# Patient Record
Sex: FEMALE | Race: WHITE | Employment: UNEMPLOYED | URBAN - METROPOLITAN AREA
[De-identification: names, ages, dates, MRNs, and addresses within clinical notes are randomized per-mention and may not be internally consistent; named-entity substitution may affect disease eponyms.]

---

## 2019-02-13 ENCOUNTER — OFFICE VISIT (OUTPATIENT)
Dept: FAMILY MEDICINE CLINIC | Age: 59
End: 2019-02-13

## 2019-02-13 VITALS
HEIGHT: 67 IN | BODY MASS INDEX: 25.39 KG/M2 | HEART RATE: 58 BPM | DIASTOLIC BLOOD PRESSURE: 50 MMHG | OXYGEN SATURATION: 97 % | TEMPERATURE: 97.5 F | SYSTOLIC BLOOD PRESSURE: 123 MMHG | WEIGHT: 161.8 LBS

## 2019-02-13 DIAGNOSIS — G89.29 CHRONIC LEFT HIP PAIN: Primary | ICD-10-CM

## 2019-02-13 DIAGNOSIS — M25.552 CHRONIC LEFT HIP PAIN: Primary | ICD-10-CM

## 2019-02-13 DIAGNOSIS — Z72.0 TOBACCO USE: ICD-10-CM

## 2019-02-13 RX ORDER — IBUPROFEN 200 MG
TABLET ORAL
COMMUNITY
End: 2019-07-22

## 2019-02-13 NOTE — PROGRESS NOTES
Marisol Gipson is a 60 y.o. female presents in office establish care, left hip pain x 2 to 3 weeks Health Maintenance Due Topic Date Due  
 Hepatitis C Screening  1960  
 DTaP/Tdap/Td series (1 - Tdap) 08/21/1980  PAP AKA CERVICAL CYTOLOGY  08/21/1980  Shingrix Vaccine Age 50> (1 of 2) 08/21/2009  BREAST CANCER SCRN MAMMOGRAM  08/21/2009  FOBT Q 1 YEAR AGE 50-75  08/21/2009  Influenza Age 5 to Adult  08/01/2018 1. Have you been to the ER, urgent care clinic since your last visit? Hospitalized since your last visit? No 
 
2. Have you seen or consulted any other health care providers outside of the [address] [place] [address] since your last visit? Include any pap smears or colon screening. Yes When: 1/2019 foot doctor through [facility]

## 2019-02-13 NOTE — PROGRESS NOTES
Herson Alanis Chief Complaint Patient presents with [facility] Establish Care  Hip Pain  
  left hip pain x 2 to 3 years Vitals:  
 02/13/19 1456 BP: 123/50 Pulse: (!) 58 Temp: 97.5 °F (36.4 °C) TempSrc: Oral  
SpO2: 97% Weight: 161 lb 12.8 oz (73.4 kg) Height: 5' 7\" (1.702 m) PainSc:  10 - Worst pain ever PainLoc: Hip HPI: Patient is here to establish care and also to address her hip pain that she had for many years patient stated that she had no medications for over 10 years and has not addressed the hip pain that is getting worse to the point the patient cannot walk without cane. Patient is she is not on any current medication other than ibuprofen as needed for pain. Patient advised to come back for physical so she can get blood work. She is active smoker for many years and she is not ready to quit at this time. History reviewed. No pertinent past medical history. Past Surgical History:  
Procedure Laterality Date  HX CERVICAL DISKECTOMY  HX CHOLECYSTECTOMY Social History Tobacco Use  Smoking status: Current Every Day Smoker Packs/day: 1.00  Smokeless tobacco: Never Used Substance Use Topics  Alcohol use: No  
  Frequency: Never History reviewed. No pertinent family history. Review of Systems Constitutional: Negative for chills, fever, malaise/fatigue and weight loss. HENT: Negative for congestion, ear discharge, ear pain, hearing loss, nosebleeds, sinus pain and sore throat. Eyes: Negative for blurred vision, double vision and discharge. Respiratory: Negative for cough, hemoptysis, sputum production, shortness of breath, wheezing and stridor. Cardiovascular: Negative for chest pain, palpitations, claudication and leg swelling. Gastrointestinal: Negative for abdominal pain, constipation, diarrhea, nausea and vomiting. Genitourinary: Negative for dysuria, frequency and urgency. Musculoskeletal: Positive for joint pain and myalgias. Negative for back pain, falls and neck pain. Skin: Negative for itching and rash. Neurological: Negative for dizziness, tingling, sensory change, speech change, focal weakness, weakness and headaches. Psychiatric/Behavioral: Negative for depression and suicidal ideas. Physical Exam  
Constitutional: She is oriented to person, place, and time. She appears well-developed and well-nourished. No distress. HENT:  
Head: Normocephalic and atraumatic. Mouth/Throat: Oropharynx is clear and moist. No oropharyngeal exudate. Eyes: Conjunctivae are normal. Pupils are equal, round, and reactive to light. Right eye exhibits no discharge. Left eye exhibits no discharge. No scleral icterus. Neck: Normal range of motion. Neck supple. No thyromegaly present. Cardiovascular: Normal rate, regular rhythm and normal heart sounds. No murmur heard. Pulmonary/Chest: Effort normal and breath sounds normal. No respiratory distress. She has no wheezes. She has no rales. She exhibits no tenderness. Abdominal: Soft. She exhibits no distension. There is no tenderness. There is no rebound. Musculoskeletal: She exhibits tenderness. She exhibits no edema or deformity. She is using a cane to walk Right hip normal range of motion Hip limited flexion and limited external rotation due to pain. Tenderness and palpation around the hip joint Lymphadenopathy:  
  She has no cervical adenopathy. Neurological: She is alert and oriented to person, place, and time. No cranial nerve deficit. Coordination normal.  
Skin: Skin is warm and dry. No rash noted. She is not diaphoretic. No erythema. No pallor. Psychiatric: She has a normal mood and affect. Her behavior is normal. Judgment and thought content normal.  
Nursing note and vitals reviewed.  
  
 
Assessment and plan  
 
Plan of care has been discussed with the patient, he agrees to the plan and verbalized understanding. All his questions were answered More than 50% of the time spent in this visit was counseling the patient about  illness and treatment options 1. Tobacco use Strongly recommend to the patient that she work on smoking cessation medications and nicotine products has been offered, patient understand that increased risk for coronary disease and stroke and lung cancer. She will like to discuss that at further visits, patient is not ready to quit now she is in pre-contemplative state 2. Chronic left hip pain Chronic hip pain x-ray showed possible avascular necrosis orthopedic referral was given - XR HIP LT W OR WO PELV 2-3 VWS; Future 
- REFERRAL TO ORTHOPEDICS Current Outpatient Medications Medication Sig Dispense Refill  ibuprofen (MOTRIN) 200 mg tablet Take  by mouth. Patient Active Problem List  
 Diagnosis Date Noted  Tobacco use 02/13/2019  Chronic left hip pain 02/13/2019 No results found for this or any previous visit. No results found for any previous visit. Follow-up Disposition: Not on File

## 2019-02-15 ENCOUNTER — TELEPHONE (OUTPATIENT)
Dept: FAMILY MEDICINE CLINIC | Age: 59
End: 2019-02-15

## 2019-02-18 NOTE — TELEPHONE ENCOUNTER
Patient x-ray showed had arthritis.     Is being referred for orthopedics for MRI and further evaluation

## 2019-02-19 ENCOUNTER — TELEPHONE (OUTPATIENT)
Dept: FAMILY MEDICINE CLINIC | Age: 59
End: 2019-02-19

## 2019-02-19 ENCOUNTER — OFFICE VISIT (OUTPATIENT)
Dept: FAMILY MEDICINE CLINIC | Age: 59
End: 2019-02-19

## 2019-02-19 VITALS
BODY MASS INDEX: 25.3 KG/M2 | DIASTOLIC BLOOD PRESSURE: 61 MMHG | HEIGHT: 67 IN | SYSTOLIC BLOOD PRESSURE: 131 MMHG | HEART RATE: 62 BPM | WEIGHT: 161.2 LBS | OXYGEN SATURATION: 97 % | TEMPERATURE: 97.9 F

## 2019-02-19 DIAGNOSIS — M21.932 ACQUIRED DEFORMITY OF LEFT WRIST: ICD-10-CM

## 2019-02-19 DIAGNOSIS — G89.29 CHRONIC LEFT HIP PAIN: Primary | ICD-10-CM

## 2019-02-19 DIAGNOSIS — M25.552 CHRONIC LEFT HIP PAIN: Primary | ICD-10-CM

## 2019-02-19 DIAGNOSIS — G89.29 CHRONIC LEFT HIP PAIN: ICD-10-CM

## 2019-02-19 DIAGNOSIS — M25.532 LEFT WRIST PAIN: ICD-10-CM

## 2019-02-19 DIAGNOSIS — M25.50 MULTIPLE JOINT PAIN: ICD-10-CM

## 2019-02-19 DIAGNOSIS — R76.8 POSITIVE ANA (ANTINUCLEAR ANTIBODY): Primary | ICD-10-CM

## 2019-02-19 DIAGNOSIS — M21.942 DEFORMITY OF LEFT HAND: ICD-10-CM

## 2019-02-19 DIAGNOSIS — M25.552 CHRONIC LEFT HIP PAIN: ICD-10-CM

## 2019-02-19 NOTE — TELEPHONE ENCOUNTER
I contacted and spoke with Daniel Huffman at [phone]  regarding scheduling her STAT MRI from her last office visit on 2/19/2019, at the direction of Dr. Dariel Canales. I verbalized:  [facility] is still working on the prior authorization with her insurance for the MRI of the hip- and I will contact her ASAP with the location for her MRI appt. Daniel Huffman acknowledged understanding, and repeated it back. Closing Encounter.

## 2019-02-19 NOTE — TELEPHONE ENCOUNTER
Dr. Terrance Gonzalez discussed this with patient in office and patient is being scheduled for stat MRI. Closing encounter.

## 2019-02-19 NOTE — PROGRESS NOTES
Mariano Barron is a 60 y.o. female presents in office left side and left hip pain x 3 to 4 years and getting worse Health Maintenance Due Topic Date Due  
 Hepatitis C Screening  1960  Pneumococcal 19-64 Medium Risk (1 of 1 - PPSV23) 08/21/1978  DTaP/Tdap/Td series (1 - Tdap) 08/21/1980  PAP AKA CERVICAL CYTOLOGY  08/21/1980  Shingrix Vaccine Age 50> (1 of 2) 08/21/2009  BREAST CANCER SCRN MAMMOGRAM  08/21/2009  FOBT Q 1 YEAR AGE 50-75  08/21/2009  Influenza Age 5 to Adult  08/01/2018 1. Have you been to the ER, urgent care clinic since your last visit? Hospitalized since your last visit? No 
 
2. Have you seen or consulted any other health care providers outside of the [address] [place] [address] since your last visit? Include any pap smears or colon screening.  No

## 2019-02-19 NOTE — PROGRESS NOTES
Audra Escobar Chief Complaint Patient presents with  Side Pain  
  left sided pain x 3 to 4 years and getting worse  Hip Pain Vitals:  
 02/19/19 2207 BP: 131/61 Pulse: 62 Temp: 97.9 °F (36.6 °C) TempSrc: Oral  
SpO2: 97% Weight: 161 lb 3.2 oz (73.1 kg) Height: 5' 7\" (1.702 m) PainSc:  10 - Worst pain ever PainLoc: Hip HPI: Patient is here for follow-up on her left hip pain, x-ray was done and it showed arthritis of remodeling and possible avascular necrosis, to orthopedics, x-ray recommended MRI, MRI will be ordered today stat before the patient seen orthopedics. Also has left wrist pain and deformity, as well as hand deformity, she has been having these changes in the last 4 months. Patient has not seen a doctor in 10 years. She had a family history of paternal aunt for rheumatoid arthritis. Blood product for rheumatoid arthritis would be done today as well as x-ray of the left wrist and hand History reviewed. No pertinent past medical history. Past Surgical History:  
Procedure Laterality Date  HX CERVICAL DISKECTOMY  HX CHOLECYSTECTOMY Social History Tobacco Use  Smoking status: Current Every Day Smoker Packs/day: 1.00  Smokeless tobacco: Never Used Substance Use Topics  Alcohol use: No  
  Frequency: Never History reviewed. No pertinent family history. Review of Systems Constitutional: Negative for chills, fever, malaise/fatigue and weight loss. HENT: Negative for congestion, ear discharge, ear pain, hearing loss and nosebleeds. Eyes: Negative for blurred vision, double vision and discharge. Respiratory: Negative for cough. Cardiovascular: Negative for chest pain, palpitations, claudication and leg swelling. Gastrointestinal: Negative for abdominal pain, constipation, diarrhea, nausea and vomiting. Genitourinary: Negative for dysuria, frequency and urgency. Musculoskeletal: Positive for joint pain. Negative for myalgias. Skin: Negative for itching and rash. Neurological: Negative for dizziness, tingling, sensory change, speech change, focal weakness, weakness and headaches. Psychiatric/Behavioral: Negative for depression and suicidal ideas. Physical Exam  
Constitutional: She is oriented to person, place, and time. She appears well-developed and well-nourished. HENT:  
Head: Normocephalic and atraumatic. Neck: Normal range of motion. Neck supple. Pulmonary/Chest: Effort normal.  
Musculoskeletal: She exhibits deformity. Patient is walking with a limp on a stick due to left hip pain. There is enlargement of the ulnar head and tenderness at the wrist area. Patient has deformity of the left hand small joints no tenderness. Neurological: She is alert and oriented to person, place, and time. She has normal reflexes. No cranial nerve deficit. Skin: Skin is warm and dry. No rash noted. No erythema. Nursing note and vitals reviewed. Assessment and plan  
 
Plan of care has been discussed with the patient, he agrees to the plan and verbalized understanding. All his questions were answered More than 50% of the time spent in this visit was counseling the patient about  illness and treatment options 1. Chronic left hip pain - MRI HIP LT WO CONT; Future - CBC WITH AUTOMATED DIFF; Future 
- RHEUMATOID FACTOR, QL; Future - DEBORAH COMPREHENSIVE PANEL; Future - C REACTIVE PROTEIN, QT; Future 2. Left wrist pain - XR WRIST LT AP/LAT; Future - CBC WITH AUTOMATED DIFF; Future 
- RHEUMATOID FACTOR, QL; Future - DEBORAH COMPREHENSIVE PANEL; Future - C REACTIVE PROTEIN, QT; Future 3. Deformity of left hand - XR HAND LT MIN 3 V; Future - CBC WITH AUTOMATED DIFF; Future 
- RHEUMATOID FACTOR, QL; Future - DEBORAH COMPREHENSIVE PANEL; Future - C REACTIVE PROTEIN, QT; Future 4.  Acquired deformity of left wrist 
 
 - XR WRIST LT AP/LAT; Future - CBC WITH AUTOMATED DIFF; Future 
- RHEUMATOID FACTOR, QL; Future - DEBORAH COMPREHENSIVE PANEL; Future - C REACTIVE PROTEIN, QT; Future 5. Multiple joint pain 
 
- CBC WITH AUTOMATED DIFF; Future 
- RHEUMATOID FACTOR, QL; Future - DEBORAH COMPREHENSIVE PANEL; Future - C REACTIVE PROTEIN, QT; Future Current Outpatient Medications Medication Sig Dispense Refill  ibuprofen (MOTRIN) 200 mg tablet Take  by mouth. Patient Active Problem List  
 Diagnosis Date Noted  Tobacco use 02/13/2019  Chronic left hip pain 02/13/2019 No results found for this or any previous visit. No results found for any previous visit. Follow-up Disposition: Not on File

## 2019-02-21 ENCOUNTER — TELEPHONE (OUTPATIENT)
Dept: FAMILY MEDICINE CLINIC | Age: 59
End: 2019-02-21

## 2019-02-21 NOTE — TELEPHONE ENCOUNTER
Pt called to get status of MRI results from 2/19/19.  Pt can be reached @ [phone]    Please advise

## 2019-02-25 NOTE — TELEPHONE ENCOUNTER
MRI showed arthritis of the hip joint, as well as avascular necrosis which is lack of blood supply to the head of the hip joint also there is some fluid collection in the hip joint.     Further plan and treatment will be discussed with orthopedics

## 2019-03-01 LAB
ABSOLUTE LYMPHOCYTE COUNT, 10803: 1.4 K/UL (ref 1–4.8)
ANTI-DNA (DS) AB QN, 1189: <1 IU/ML
BASOPHILS # BLD: 0 K/UL (ref 0–0.2)
BASOPHILS NFR BLD: 1 % (ref 0–2)
C-REACTIVE PROTEIN, QT, 006627: 0.2 MG/DL (ref 0–0.5)
CENTROMERE B ANTIBODY, 601143: >8 AI
CHROMATIN ANTIBODY: <0.2 AI
ENA SS-A AB SER-ACNC: <0.2 AI
ENA SS-B AB SER-ACNC: <0.2 AI
EOSINOPHIL # BLD: 0.2 K/UL (ref 0–0.5)
EOSINOPHIL NFR BLD: 4 % (ref 0–6)
ERYTHROCYTE [DISTWIDTH] IN BLOOD BY AUTOMATED COUNT: 13.9 % (ref 10–15.5)
GRANULOCYTES,GRANS: 60 % (ref 40–75)
HCT VFR BLD AUTO: 39.3 % (ref 35.1–48)
HGB BLD-MCNC: 12.6 G/DL (ref 11.7–16)
JO1 ANTIBODY, 8107: <0.2 AI
LYMPHOCYTES, LYMLT: 26 % (ref 20–45)
MCH RBC QN AUTO: 32 PG (ref 26–34)
MCHC RBC AUTO-ENTMCNC: 32 G/DL (ref 31–36)
MCV RBC AUTO: 98 FL (ref 80–95)
MONOCYTES # BLD: 0.5 K/UL (ref 0.1–1)
MONOCYTES NFR BLD: 9 % (ref 3–12)
NEUTROPHILS # BLD AUTO: 3.1 K/UL (ref 1.8–7.7)
PLATELET # BLD AUTO: 202 K/UL (ref 140–440)
PMV BLD AUTO: 10.1 FL (ref 9–13)
RBC # BLD AUTO: 4 M/UL (ref 3.8–5.2)
RHEUMATOID FACTOR QUANT, IMMUNOTURBIDIMETRIC: <20 IU/ML (ref 0–20)
RNP ABS, 016354: 0.7 AI
SCLERODERMA AB (SCL-70), 601116: <0.2 AI
SMITH ABS, 016362: <0.2 AI
WBC # BLD AUTO: 5.2 K/UL (ref 4–11)

## 2019-03-06 NOTE — PROGRESS NOTES
Patient negative for rheumatoid factor and C-reactive protein this is positive for 1 of the DEBORAH antibodies she will be referred for rheumatology

## 2019-03-08 ENCOUNTER — TELEPHONE (OUTPATIENT)
Dept: FAMILY MEDICINE CLINIC | Age: 59
End: 2019-03-08

## 2019-03-08 NOTE — TELEPHONE ENCOUNTER
----- Message from Jazz Mace MD sent at 3/6/2019  1:14 PM EST -----  Patient negative for rheumatoid factor and C-reactive protein this is positive for 1 of the DEBORAH antibodies she will be referred for rheumatology

## 2019-03-19 DIAGNOSIS — M25.552 CHRONIC LEFT HIP PAIN: ICD-10-CM

## 2019-03-19 DIAGNOSIS — G89.29 CHRONIC LEFT HIP PAIN: ICD-10-CM

## 2019-04-02 ENCOUNTER — OFFICE VISIT (OUTPATIENT)
Dept: FAMILY MEDICINE CLINIC | Age: 59
End: 2019-04-02

## 2019-04-02 VITALS
WEIGHT: 156 LBS | BODY MASS INDEX: 24.48 KG/M2 | DIASTOLIC BLOOD PRESSURE: 73 MMHG | TEMPERATURE: 98 F | HEIGHT: 67 IN | OXYGEN SATURATION: 98 % | RESPIRATION RATE: 18 BRPM | HEART RATE: 70 BPM | SYSTOLIC BLOOD PRESSURE: 135 MMHG

## 2019-04-02 NOTE — PROGRESS NOTES
A user error has taken place: encounter opened in error, closed for administrative reasons. Patient was not seen by provider.

## 2019-04-30 ENCOUNTER — OFFICE VISIT (OUTPATIENT)
Dept: FAMILY MEDICINE CLINIC | Age: 59
End: 2019-04-30

## 2019-04-30 VITALS
HEART RATE: 75 BPM | BODY MASS INDEX: 24.48 KG/M2 | WEIGHT: 156 LBS | HEIGHT: 67 IN | TEMPERATURE: 98.1 F | RESPIRATION RATE: 18 BRPM | OXYGEN SATURATION: 98 % | DIASTOLIC BLOOD PRESSURE: 78 MMHG | SYSTOLIC BLOOD PRESSURE: 158 MMHG

## 2019-04-30 DIAGNOSIS — M25.552 LEFT HIP PAIN: ICD-10-CM

## 2019-04-30 DIAGNOSIS — Z01.818 PREOP EXAMINATION: Primary | ICD-10-CM

## 2019-04-30 DIAGNOSIS — M16.12 PRIMARY OSTEOARTHRITIS OF LEFT HIP: ICD-10-CM

## 2019-04-30 RX ORDER — OXYCODONE AND ACETAMINOPHEN 10; 325 MG/1; MG/1
1 TABLET ORAL
Qty: 21 TAB | Refills: 0 | Status: SHIPPED | OUTPATIENT
Start: 2019-04-30 | End: 2019-05-07

## 2019-04-30 NOTE — PROGRESS NOTES
Aguilar Morrison is a 60 y.o. female presents to office for pre op    Medication list has been reviewed with Aguilar Morrison and updated as of today's date     Health Maintenance items with a due date reviewed with patient:  Health Maintenance Due   Topic Date Due    Hepatitis C Screening  1960    Pneumococcal 0-64 years (1 of 1 - PPSV23) 08/21/1965    DTaP/Tdap/Td series (1 - Tdap) 08/21/1980    PAP AKA CERVICAL CYTOLOGY  08/21/1980    Shingrix Vaccine Age 50> (1 of 2) 08/21/2009    BREAST CANCER SCRN MAMMOGRAM  08/21/2009    FOBT Q 1 YEAR AGE 50-75  08/21/2009

## 2019-04-30 NOTE — PROGRESS NOTES
Bertrand Pollock     Chief Complaint   Patient presents with    Pre-op Exam     Vitals:    04/30/19 1331 04/30/19 1405   BP: 160/80 158/78   Pulse: 75    Resp: 18    Temp: 98.1 °F (36.7 °C)    TempSrc: Oral    SpO2: 98%    Weight: 156 lb (70.8 kg)    Height: 5' 7\" (1.702 m)    PainSc:  10 - Worst pain ever          HPI: Patient is here for preop for left hip replacement, due to osteoarthritis of the left hip. She was seen by Meche Hameed and she will be scheduled for total hip replacement at [facility] end of April or beginning of May preop blood work was done is reviewed is within normal limits chest x-ray is negative EKG normal sinus rhythm no acute changes. Patient has no chest pain, no dyspnea or or chest pain with exertion. She currently cutting back on her smoking now she is down to half a pack a day. Patient never had any problem with heart disease, no history of hypertension or diabetes. She is complaining today about left hip pain pain is 10 out of 10, she is taking ibuprofen over-the-counter 200 mg 3 tablets twice daily, with no much relief in her pain. Blood pressure is elevated today patient does not have hypertension possible related to pain. Past Medical History:   Diagnosis Date    Back pain      Past Surgical History:   Procedure Laterality Date    HX CERVICAL DISKECTOMY      HX CHOLECYSTECTOMY       Social History     Tobacco Use    Smoking status: Current Every Day Smoker     Packs/day: 1.00    Smokeless tobacco: Never Used   Substance Use Topics    Alcohol use: No     Frequency: Never       History reviewed. No pertinent family history. Review of Systems   Constitutional: Negative for chills, fever, malaise/fatigue and weight loss. HENT: Negative for congestion, ear discharge, ear pain, hearing loss, nosebleeds, sinus pain and sore throat. Eyes: Negative for blurred vision, double vision and discharge.    Respiratory: Negative for cough, hemoptysis, shortness of breath and wheezing. Cardiovascular: Negative for chest pain, palpitations, claudication and leg swelling. Gastrointestinal: Negative for abdominal pain, constipation, diarrhea, nausea and vomiting. Genitourinary: Negative for dysuria, frequency and urgency. Musculoskeletal: Positive for joint pain. Negative for myalgias. Skin: Negative for itching and rash. Neurological: Negative for dizziness, tingling, sensory change, speech change, focal weakness, weakness and headaches. Psychiatric/Behavioral: Negative for depression and suicidal ideas. Physical Exam   Constitutional: She is oriented to person, place, and time. She appears well-developed and well-nourished. No distress. HENT:   Head: Normocephalic and atraumatic. Mouth/Throat: Oropharynx is clear and moist. No oropharyngeal exudate. Eyes: Pupils are equal, round, and reactive to light. Conjunctivae are normal. Right eye exhibits no discharge. Left eye exhibits no discharge. No scleral icterus. Neck: Normal range of motion. Neck supple. No thyromegaly present. Cardiovascular: Normal rate, regular rhythm and normal heart sounds. No murmur heard. Pulmonary/Chest: Effort normal and breath sounds normal. No respiratory distress. She has no wheezes. She has no rales. She exhibits no tenderness. Abdominal: Soft. Bowel sounds are normal. She exhibits no distension. There is no tenderness. There is no rebound. Musculoskeletal: Normal range of motion. She exhibits tenderness. She exhibits no edema or deformity. Lymphadenopathy:     She has no cervical adenopathy. Neurological: She is alert and oriented to person, place, and time. No cranial nerve deficit. Coordination normal.   Skin: Skin is warm and dry. No rash noted. She is not diaphoretic. No erythema. No pallor. Psychiatric: She has a normal mood and affect.  Her behavior is normal. Judgment and thought content normal.   Nursing note and vitals reviewed. Assessment and plan     Plan of care has been discussed with the patient, he agrees to the plan and verbalized understanding. All his questions were answered  More than 50% of the time spent in this visit was counseling the patient about  illness and treatment options         1. Primary osteoarthritis of left hip    - oxyCODONE-acetaminophen (PERCOCET 10)  mg per tablet; Take 1 Tab by mouth every eight (8) hours as needed for Pain for up to 7 days. Max Daily Amount: 3 Tabs. Dispense: 21 Tab; Refill: 0    2. Left hip pain      3. Preop examination      Patient is medically clear for left hip replacement under general anesthesia,    Patient had elevated blood pressure today she was advised to come to the office in 2 days to recheck blood pressure, please check blood pressure day of surgery. Current Outpatient Medications   Medication Sig Dispense Refill    oxyCODONE-acetaminophen (PERCOCET 10)  mg per tablet Take 1 Tab by mouth every eight (8) hours as needed for Pain for up to 7 days. Max Daily Amount: 3 Tabs. 21 Tab 0    ibuprofen (MOTRIN) 200 mg tablet Take  by mouth.  ibuprofen (MOTRIN) 600 mg tablet Take 1 Tab by mouth every six (6) hours as needed for Pain. 20 Tab 0       Patient Active Problem List    Diagnosis Date Noted    Tobacco use 02/13/2019    Chronic left hip pain 02/13/2019     Results for orders placed or performed in visit on 02/19/19   CBC WITH AUTOMATED DIFF   Result Value Ref Range    WBC 5.2 4.0 - 11.0 K/uL    RBC 4.00 3.80 - 5.20 M/uL    HGB 12.6 11.7 - 16.0 g/dL    HCT 39.3 35.1 - 48.0 %    MCV 98 (H) 80 - 95 fL    MCH 32 26 - 34 pg    MCHC 32 31 - 36 g/dL    RDW 13.9 10.0 - 15.5 %    PLATELET 248 495 - 615 K/uL    MPV 10.1 9.0 - 13.0 fL    NEUTROPHILS 60 40 - 75 %    Lymphocytes 26 20 - 45 %    MONOCYTES 9 3 - 12 %    EOSINOPHILS 4 0 - 6 %    BASOPHILS 1 0 - 2 %    ABS.  NEUTROPHILS 3.1 1.8 - 7.7 K/uL    ABSOLUTE LYMPHOCYTE COUNT 1.4 1.0 - 4.8 K/uL ABS. MONOCYTES 0.5 0.1 - 1.0 K/uL    ABS. EOSINOPHILS 0.2 0.0 - 0.5 K/uL    ABS. BASOPHILS 0.0 0.0 - 0.2 K/uL   RHEUMATOID FACTOR, QL   Result Value Ref Range    RHEUMATOID FACTOR QUANT, IMMUNOTURBIDIMETRIC <20 0 - 20 IU/mL   DEBORAH COMPREHENSIVE PANEL   Result Value Ref Range    Sjogren's Anti-SS-A <0.2 <1.0 AI    Sjogren's Anti-SS-B <0.2 <1.0 AI    JO1 ANTIBODY <0.2 <1.0 AI    RNP Abs 0.7 <1.0 AI    Scleroderma Ab (SCL-70) <0.2 <1.0 AI    CENTROMERE B ANTIBODY >8.0 (H) <1.0 AI    Smith Abs <0.2 <1.0 AI    CHROMATIN ANTIBODY <0.2 <1.0 AI    ANTI-DNA (DS) AB QN <1 <=4 IU/ml   C REACTIVE PROTEIN, QT   Result Value Ref Range    C-Reactive Protein, Qt 0.2 0.0 - 0.5 mg/dL     Orders Only on 02/19/2019   Component Date Value Ref Range Status    WBC 02/28/2019 5.2  4.0 - 11.0 K/uL Final    RBC 02/28/2019 4.00  3.80 - 5.20 M/uL Final    HGB 02/28/2019 12.6  11.7 - 16.0 g/dL Final    HCT 02/28/2019 39.3  35.1 - 48.0 % Final    MCV 02/28/2019 98* 80 - 95 fL Final    MCH 02/28/2019 32  26 - 34 pg Final    MCHC 02/28/2019 32  31 - 36 g/dL Final    RDW 02/28/2019 13.9  10.0 - 15.5 % Final    PLATELET 84/49/7552 065  140 - 440 K/uL Final    MPV 02/28/2019 10.1  9.0 - 13.0 fL Final    NEUTROPHILS 02/28/2019 60  40 - 75 % Final    Lymphocytes 02/28/2019 26  20 - 45 % Final    MONOCYTES 02/28/2019 9  3 - 12 % Final    EOSINOPHILS 02/28/2019 4  0 - 6 % Final    BASOPHILS 02/28/2019 1  0 - 2 % Final    ABS. NEUTROPHILS 02/28/2019 3.1  1.8 - 7.7 K/uL Final    ABSOLUTE LYMPHOCYTE COUNT 02/28/2019 1.4  1.0 - 4.8 K/uL Final    ABS. MONOCYTES 02/28/2019 0.5  0.1 - 1.0 K/uL Final    ABS. EOSINOPHILS 02/28/2019 0.2  0.0 - 0.5 K/uL Final    ABS.  BASOPHILS 02/28/2019 0.0  0.0 - 0.2 K/uL Final    RHEUMATOID FACTOR QUANT, IMMUNOTUR* 02/28/2019 <20  0 - 20 IU/mL Final    Sjogren's Anti-SS-A 02/28/2019 <0.2  <1.0 AI Final    Sjogren's Anti-SS-B 02/28/2019 <0.2  <1.0 AI Final    JO1 ANTIBODY 02/28/2019 <0.2  <1.0 AI Final    RNP Abs 02/28/2019 0.7  <1.0 AI Final    Scleroderma Ab (SCL-70) 02/28/2019 <0.2  <1.0 AI Final    CENTROMERE B ANTIBODY 02/28/2019 >8.0* <1.0 AI Final    Smith Abs 02/28/2019 <0.2  <1.0 AI Final    CHROMATIN ANTIBODY 02/28/2019 <0.2  <1.0 AI Final    ANTI-DNA (DS) AB QN 02/28/2019 <1  <=4 IU/ml Final    Comment: Autoimmune connective tissue diseases may present with similar symptoms,   making  diagnosis difficult. The DEBORAH comprehensive tests performed by multiplex flow  immunoassay determine specific auto-antibodies. The results of these tests  correlated with clinical evaluation may aid in the diagnosis of connective  tissue diseases, including: Scleroderma, Sj gren syndrome, systemic lupus  erythematosus, mixed connective tissue disease, CREST syndrome, and  polymyositis. DEBORAH antibodies are the most common antibodies and may be   present  prior to disease onset. Although certain antibodies show specificity for  certain disease, DEBORAH antibodies are not specific for connective tissue disease  and may be present due to cancer, infectious diseases, or advanced age. A  negative DEBORAH comprehensive result does not preclude the presence of connective  tissue disease.   Antibody           Abnormal      Associated Disease                      Value  SS-A/SS-B Abs      >=1.0 AI      Supports diagnos                           is of Sjogren syndrome or                                    other connective tissue disease  Jaci-1 Abs           >=1.0 AI      Supports diagnosis of polymyositis  RNP Abs            >=1.0 AI      Supports diagnosis of mixed connective tissue  disease  Scl-70 Abs         >=1.0 AI      Supports diagnosis of scleroderma  Centromere B Abs   >=1.0 AI      Supports diagnosis of CREST Syndrome  Israel Abs          >=1.0 AI      Supports diagnosis of systemic lupus  erythematosus  Chromatin Abs      >=1.0 AI      Supports diagnosis of systemic lupus  erythematosus  dsDNA Abs         >=10.0 IU/ml Supports diagnosis of systemic lupus  erythematosus;                                    values between 5-9 IU/ml are indeterminate      C-Reactive Protein, Qt 02/28/2019 0.2  0.0 - 0.5 mg/dL Final          Follow-up and Dispositions    · Return for schedule [facility] and then annual physical .

## 2019-05-02 ENCOUNTER — CLINICAL SUPPORT (OUTPATIENT)
Dept: FAMILY MEDICINE CLINIC | Age: 59
End: 2019-05-02

## 2019-05-02 VITALS — DIASTOLIC BLOOD PRESSURE: 78 MMHG | SYSTOLIC BLOOD PRESSURE: 150 MMHG

## 2019-05-02 DIAGNOSIS — Z01.30 BLOOD PRESSURE CHECK: Primary | ICD-10-CM

## 2019-05-07 ENCOUNTER — CLINICAL SUPPORT (OUTPATIENT)
Dept: FAMILY MEDICINE CLINIC | Age: 59
End: 2019-05-07

## 2019-05-07 VITALS — SYSTOLIC BLOOD PRESSURE: 136 MMHG | DIASTOLIC BLOOD PRESSURE: 68 MMHG | HEART RATE: 81 BPM

## 2019-05-07 DIAGNOSIS — Z01.30 BP CHECK: Primary | ICD-10-CM

## 2019-07-22 ENCOUNTER — TELEPHONE (OUTPATIENT)
Dept: FAMILY MEDICINE CLINIC | Age: 59
End: 2019-07-22

## 2019-07-22 ENCOUNTER — OFFICE VISIT (OUTPATIENT)
Dept: FAMILY MEDICINE CLINIC | Age: 59
End: 2019-07-22

## 2019-07-22 VITALS
SYSTOLIC BLOOD PRESSURE: 118 MMHG | BODY MASS INDEX: 23.95 KG/M2 | TEMPERATURE: 98.2 F | HEIGHT: 67 IN | HEART RATE: 68 BPM | DIASTOLIC BLOOD PRESSURE: 60 MMHG | OXYGEN SATURATION: 95 % | WEIGHT: 152.6 LBS | RESPIRATION RATE: 16 BRPM

## 2019-07-22 DIAGNOSIS — R05.9 COUGH: ICD-10-CM

## 2019-07-22 DIAGNOSIS — B37.2 CANDIDAL DERMATITIS: ICD-10-CM

## 2019-07-22 DIAGNOSIS — Z72.0 TOBACCO USE: ICD-10-CM

## 2019-07-22 DIAGNOSIS — K42.9 PERIUMBILICAL HERNIA: Primary | ICD-10-CM

## 2019-07-22 RX ORDER — HYDROGEN PEROXIDE 3 %
20 SOLUTION, NON-ORAL MISCELLANEOUS DAILY
COMMUNITY
End: 2019-12-05

## 2019-07-22 RX ORDER — NYSTATIN AND TRIAMCINOLONE ACETONIDE 100000; 1 [USP'U]/G; MG/G
CREAM TOPICAL 2 TIMES DAILY
Qty: 30 G | Refills: 0 | Status: SHIPPED | OUTPATIENT
Start: 2019-07-22 | End: 2019-09-04

## 2019-07-22 RX ORDER — HYDROCODONE POLISTIREX AND CHLORPHENIRAMINE POLISTIREX 10; 8 MG/5ML; MG/5ML
1 SUSPENSION, EXTENDED RELEASE ORAL
Qty: 115 ML | Refills: 0 | Status: SHIPPED | OUTPATIENT
Start: 2019-07-22 | End: 2019-08-01

## 2019-07-22 NOTE — PROGRESS NOTES
Raul Joel is a 60 y.o. female presents in office for cold and cough symptoms and rash under left breast. Patient has hard painful ball just left lateral to her umbilicus. Health Maintenance Due   Topic Date Due    Hepatitis C Screening  1960    Pneumococcal 0-64 years (1 of 1 - PPSV23) 08/21/1965    DTaP/Tdap/Td series (1 - Tdap) 08/21/1980    PAP AKA CERVICAL CYTOLOGY  08/21/1980    Shingrix Vaccine Age 50> (1 of 2) 08/21/2009    BREAST CANCER SCRN MAMMOGRAM  08/21/2009    FOBT Q 1 YEAR AGE 50-75  08/21/2009       1. Have you been to the ER, urgent care clinic since your last visit? Hospitalized since your last visit? No    2. Have you seen or consulted any other health care providers outside of the [address] [place] [address] since your last visit? Include any pap smears or colon screening.  No

## 2019-07-22 NOTE — TELEPHONE ENCOUNTER
Prior authorization request for Tussionex submitted via [employer]. [employer].  Awaiting approval or denial.

## 2019-07-22 NOTE — TELEPHONE ENCOUNTER
Per fax from pharmacy, chlorpheniramine-HYDROcodone (TUSSIONEX) 10-8 mg/5 mL suspension requires auth.     CoverMyMeds key: [id number]

## 2019-07-22 NOTE — PROGRESS NOTES
SUBJECTIVE:  Mckenna Almendarez is a 60y.o. year old female   Chief Complaint   Patient presents with    Cold Symptoms     cough    Rash     Left breast    Mass     abdominal wall       History of Present Illness: The patient is new to me; but is established in this clinic. She is here because she has been abdominal wall mass for last 2 months. The mass bulges out with coughing and she is having soreness with pain on coughing for last few days. She denies any nausea, vomiting or anorexia. She has chronic recurrent cough. She smokes about 1 pack/day. This time she has a nonproductive cough for last 2 weeks. She denies any chills, fever, wheeze, chest pain or shortness of breath. OTC cough medication is not helping. She also has a pruritic rash under her left breast for about the last few weeks. She takes Motrin for pain as needed. She takes Prilosec for acid reflux which is controlled. Patient denies any other complaints. She answers \"no\" to the rest of the review of system. Past Medical History:   Diagnosis Date    Back pain      Past Surgical History:   Procedure Laterality Date    HX CERVICAL DISKECTOMY      HX CHOLECYSTECTOMY          Current Outpatient Medications   Medication Sig    esomeprazole (NEXIUM) 20 mg capsule Take 20 mg by mouth daily.  ibuprofen (MOTRIN) 600 mg tablet Take 1 Tab by mouth every six (6) hours as needed for Pain. No current facility-administered medications for this visit. Allergies   Allergen Reactions    Keflex [Cephalexin] Rash          Review of Systems:   Constitutional: No fever, chills, night sweats, malaise, dizziness. Cardiovascular: No angina, palpitations, PND, orthopnea, lightheadedness, edema, claudication. Respiratory: No dyspnea, wheeze, pleurisy, hemoptysis, unusual sputum. Gastrointestinal: Abdominal bowels and soreness as above. No nausea/ vomiting, bowel habit change, melena, hematochezia, anorexia. Musculoskeletal: No joint swelling/pain, instability, focal weakness, stiffness/rigidity, radicular pain. Psychiatric: No agitation, confusion/disorientation, suicidal or homicidal ideation. Skin: Pruritic rash under left breast.  Otherwise no complaint. OBJECTIVE:  Physical Exam:   Constitutional: General Appearance:  well developed, well nourished, nontoxic, in no acute distress. Visit Vitals  /60 (BP 1 Location: Right arm, BP Patient Position: Sitting)   Pulse 68   Temp 98.2 °F (36.8 °C) (Oral)   Resp 16   Ht 5' 7\" (1.702 m)   Wt 152 lb 9.6 oz (69.2 kg)   SpO2 95%   BMI 23.90 kg/m²     Otoscopic Examination: external auditory canals are clear; tympanic membranes are dull. Nasal Cavity: mildly swollen mucosa & turbinates. Maxillas are not tender w/o redness or heat. Throat: clear tonsils, oropharynx, posterior pharynx. Pulmonary: Respiratory effort: normal; no dyspnea, no retractions, no accessory muscle use. Auscultation: normal & symmetrical air exchange; no rales, no rhonchi, no wheeze; no rubs    Cardiovascular: Palpation: PMI not displaced or enlarged, no thrills or heaves. Auscultation: RRR; no murmur, rubs or gallops. Extremities: no edema, no active varicosity. GI[de-identified] Normal bowel sounds. There is a tender bulge left of the umbilicus, which is easily reducible; no rebound/rigidity; no CVA tenderness. No hepatosplenomegaly. Psychiatric: Oriented to time, place and person. Musculoskeletal: NC/AT. Neck-supple. Skin: Mild papulosquamous rash with few satellite papules under the left breast.    CXR today: Clear. ASSESSMENT:     1. Periumbilical hernia    2. Cough    3. Tobacco use    4.  Candidal dermatitis        PLAN:     Orders Placed This Encounter    XR CHEST PA LAT    REFERRAL TO SURGERY    chlorpheniramine-HYDROcodone (TUSSIONEX) 10-8 mg/5 mL suspension    nystatin-triamcinolone (MYCOLOG II) topical cream     Pharmacologic Management: Medications reviewed with the patient. Tussionex 1 teaspoon every 12 hour as needed with caution. Mycolog twice daily on the rash. Follow-up with surgery. Discussed as needed visit to ED in details. Discussed skin care. Discussed smoking cessation. Discussed DDx, follow-up & work-up. Discussed risk/benefit & side effect of treatment. Follow up visit as planned, prn sooner. Health risk from non adherence discussed. Patient voiced understanding. HM reviewed with patient. She is to follow up with her PCP. 35 minutes were spent on face to face time with this patient for the aforementioned problems, diagnoses and management plans. >50% of the time was spent counseling and coordinating care. All questions answered with patient's satisfaction. Follow-up and Dispositions    · Return in about 2 weeks (around 8/5/2019) with PCP, or sooner if symptoms worsen or fail to improve.        Eric Bartlett MD

## 2019-07-23 ENCOUNTER — TELEPHONE (OUTPATIENT)
Dept: FAMILY MEDICINE CLINIC | Age: 59
End: 2019-07-23

## 2019-07-23 NOTE — TELEPHONE ENCOUNTER
Called patient to verify if appointment was changed to a location she wanted to go. Patient stated the appointment has been changed and has an appointment this week.

## 2019-08-05 ENCOUNTER — TELEPHONE (OUTPATIENT)
Dept: FAMILY MEDICINE CLINIC | Age: 59
End: 2019-08-05

## 2019-08-05 NOTE — TELEPHONE ENCOUNTER
Received notification that Tussionex required PA. Pa submitted online through [employer] is [id number].

## 2019-09-04 ENCOUNTER — OFFICE VISIT (OUTPATIENT)
Dept: FAMILY MEDICINE CLINIC | Age: 59
End: 2019-09-04

## 2019-09-04 VITALS
WEIGHT: 154.6 LBS | RESPIRATION RATE: 18 BRPM | DIASTOLIC BLOOD PRESSURE: 65 MMHG | HEIGHT: 67 IN | OXYGEN SATURATION: 95 % | SYSTOLIC BLOOD PRESSURE: 112 MMHG | HEART RATE: 68 BPM | TEMPERATURE: 98.4 F | BODY MASS INDEX: 24.27 KG/M2

## 2019-09-04 DIAGNOSIS — L29.9 SEVERE ITCHING: ICD-10-CM

## 2019-09-04 DIAGNOSIS — B86 SCABIES INFESTATION: Primary | ICD-10-CM

## 2019-09-04 PROBLEM — Z96.642 STATUS POST TOTAL REPLACEMENT OF LEFT HIP: Status: ACTIVE | Noted: 2019-05-10

## 2019-09-04 PROBLEM — Z86.718 HISTORY OF DVT (DEEP VEIN THROMBOSIS): Status: ACTIVE | Noted: 2019-05-09

## 2019-09-04 PROBLEM — Z86.79 HISTORY OF HYPERTENSION: Status: ACTIVE | Noted: 2019-05-09

## 2019-09-04 PROBLEM — M16.9 DEGENERATIVE JOINT DISEASE (DJD) OF HIP: Status: ACTIVE | Noted: 2019-05-07

## 2019-09-04 RX ORDER — HYDROCODONE POLISTIREX AND CHLORPHENIRAMINE POLISTIREX 10; 8 MG/5ML; MG/5ML
SUSPENSION, EXTENDED RELEASE ORAL
Refills: 0 | COMMUNITY
Start: 2019-08-04 | End: 2019-09-04

## 2019-09-04 RX ORDER — MELOXICAM 7.5 MG/1
TABLET ORAL
Refills: 2 | COMMUNITY
Start: 2019-07-10 | End: 2019-09-04

## 2019-09-04 RX ORDER — PREDNISONE 20 MG/1
20 TABLET ORAL
Qty: 6 TAB | Refills: 0 | Status: SHIPPED | OUTPATIENT
Start: 2019-09-04 | End: 2019-12-05

## 2019-09-04 RX ORDER — IVERMECTIN 3 MG/1
3 TABLET ORAL ONCE
Qty: 1 TAB | Refills: 0 | Status: SHIPPED | OUTPATIENT
Start: 2019-09-04 | End: 2019-09-04

## 2019-09-04 RX ORDER — OXYCODONE AND ACETAMINOPHEN 5; 325 MG/1; MG/1
1-2 TABLET ORAL
COMMUNITY
Start: 2019-08-26 | End: 2019-09-04

## 2019-09-04 RX ORDER — HYDROXYZINE 25 MG/1
TABLET, FILM COATED ORAL
Refills: 0 | COMMUNITY
Start: 2019-08-23 | End: 2019-09-04

## 2019-09-04 RX ORDER — ACETAMINOPHEN 325 MG/1
325 TABLET ORAL
COMMUNITY
End: 2019-09-04

## 2019-09-04 NOTE — PROGRESS NOTES
Venancio Cid     Chief Complaint   Patient presents with    Rash     back and abdomen area; itching x \"couple weeks\"    Cold Symptoms     f/u -- still having congestion and cough     Vitals:    09/04/19 0919   BP: 112/65   Pulse: 68   Resp: 18   Temp: 98.4 °F (36.9 °C)   TempSrc: Oral   SpO2: 95%   Weight: 154 lb 9.6 oz (70.1 kg)   Height: 5' 7\" (1.702 m)   PainSc:   3   PainLoc: Abdomen         HPI: Patient states that she was exposed to an scabies, she is taking care of her sister-in-law who had a stroke, and she had a scabies infestation, patient had severe itching all over her body. Patient has no fever no chills no nausea no vomiting no abdominal pain        At recently she had a umbilical hernia repair. Past Medical History:   Diagnosis Date    Back pain      Past Surgical History:   Procedure Laterality Date    HX CERVICAL DISKECTOMY      HX CHOLECYSTECTOMY      HX HERNIA REPAIR  08/19/2019     Social History     Tobacco Use    Smoking status: Current Every Day Smoker     Packs/day: 1.00    Smokeless tobacco: Never Used   Substance Use Topics    Alcohol use: No     Frequency: Never       No family history on file. Review of Systems   Constitutional: Negative for chills, fever, malaise/fatigue and weight loss. HENT: Positive for congestion. Negative for ear discharge, ear pain, hearing loss and nosebleeds. Eyes: Negative for blurred vision, double vision and discharge. Respiratory: Positive for cough. Negative for shortness of breath. Cardiovascular: Negative for chest pain, palpitations, claudication and leg swelling. Gastrointestinal: Negative for abdominal pain, constipation, diarrhea, nausea and vomiting. Genitourinary: Negative for dysuria, frequency and urgency. Musculoskeletal: Positive for joint pain. Negative for myalgias. Skin: Positive for rash. Negative for itching.    Neurological: Negative for dizziness, tingling, sensory change, speech change, focal weakness, weakness and headaches. Physical Exam   Constitutional: She is oriented to person, place, and time. She appears well-developed and well-nourished. No distress. HENT:   Head: Normocephalic and atraumatic. Mouth/Throat: Oropharynx is clear and moist. No oropharyngeal exudate. Eyes: Pupils are equal, round, and reactive to light. Conjunctivae are normal.   Cardiovascular: Regular rhythm. Pulmonary/Chest: Effort normal and breath sounds normal.   Musculoskeletal: Normal range of motion. Neurological: She is alert and oriented to person, place, and time. Skin: Skin is warm and dry. Rash noted. She is not diaphoretic. There is erythema. No pallor. Patient has a skin rash in her abdomen under her axilla and on her breast    Psychiatric: She has a normal mood and affect. Her behavior is normal. Judgment and thought content normal.   Nursing note and vitals reviewed. Assessment and plan     Plan of care has been discussed with the patient, he agrees to the plan and verbalized understanding. All his questions were answered  More than 50% of the time spent in this visit was counseling the patient about  illness and treatment options         1. Scabies infestation  - ivermectin (STROMECTOL) 3 mg tablet; Take 1 Tab by mouth once for 1 dose. Dispense: 1 Tab; Refill: 0  - predniSONE (DELTASONE) 20 mg tablet; Take 20 mg by mouth daily (with breakfast). 2 tablets daily for 3 days  Dispense: 6 Tab; Refill: 0    2. Severe itching    - predniSONE (DELTASONE) 20 mg tablet; Take 20 mg by mouth daily (with breakfast). 2 tablets daily for 3 days  Dispense: 6 Tab; Refill: 0    Current Outpatient Medications   Medication Sig Dispense Refill    ivermectin (STROMECTOL) 3 mg tablet Take 1 Tab by mouth once for 1 dose. 1 Tab 0    predniSONE (DELTASONE) 20 mg tablet Take 20 mg by mouth daily (with breakfast).  2 tablets daily for 3 days 6 Tab 0    esomeprazole (NEXIUM) 20 mg capsule Take 20 mg by mouth daily.      ibuprofen (MOTRIN) 600 mg tablet Take 1 Tab by mouth every six (6) hours as needed for Pain. 20 Tab 0       Patient Active Problem List    Diagnosis Date Noted    Status post total replacement of left hip 05/10/2019    History of DVT (deep vein thrombosis) 05/09/2019    History of hypertension 05/09/2019    Degenerative joint disease (DJD) of hip 05/07/2019    Tobacco use 02/13/2019    Chronic left hip pain 02/13/2019    S/P cervical discectomy 07/20/2011     Results for orders placed or performed in visit on 02/19/19   CBC WITH AUTOMATED DIFF   Result Value Ref Range    WBC 5.2 4.0 - 11.0 K/uL    RBC 4.00 3.80 - 5.20 M/uL    HGB 12.6 11.7 - 16.0 g/dL    HCT 39.3 35.1 - 48.0 %    MCV 98 (H) 80 - 95 fL    MCH 32 26 - 34 pg    MCHC 32 31 - 36 g/dL    RDW 13.9 10.0 - 15.5 %    PLATELET 953 180 - 507 K/uL    MPV 10.1 9.0 - 13.0 fL    NEUTROPHILS 60 40 - 75 %    Lymphocytes 26 20 - 45 %    MONOCYTES 9 3 - 12 %    EOSINOPHILS 4 0 - 6 %    BASOPHILS 1 0 - 2 %    ABS. NEUTROPHILS 3.1 1.8 - 7.7 K/uL    ABSOLUTE LYMPHOCYTE COUNT 1.4 1.0 - 4.8 K/uL    ABS. MONOCYTES 0.5 0.1 - 1.0 K/uL    ABS. EOSINOPHILS 0.2 0.0 - 0.5 K/uL    ABS. BASOPHILS 0.0 0.0 - 0.2 K/uL   RHEUMATOID FACTOR, QL   Result Value Ref Range    RHEUMATOID FACTOR QUANT, IMMUNOTURBIDIMETRIC <20 0 - 20 IU/mL   DEBORAH COMPREHENSIVE PANEL   Result Value Ref Range    Sjogren's Anti-SS-A <0.2 <1.0 AI    Sjogren's Anti-SS-B <0.2 <1.0 AI    JO1 ANTIBODY <0.2 <1.0 AI    RNP Abs 0.7 <1.0 AI    Scleroderma Ab (SCL-70) <0.2 <1.0 AI    CENTROMERE B ANTIBODY >8.0 (H) <1.0 AI    Smith Abs <0.2 <1.0 AI    CHROMATIN ANTIBODY <0.2 <1.0 AI    ANTI-DNA (DS) AB QN <1 <=4 IU/ml   C REACTIVE PROTEIN, QT   Result Value Ref Range    C-Reactive Protein, Qt 0.2 0.0 - 0.5 mg/dL     No visits with results within 3 Month(s) from this visit.    Latest known visit with results is:   Orders Only on 02/19/2019   Component Date Value Ref Range Status    WBC 02/28/2019 5.2  4.0 - 11.0 K/uL Final    RBC 02/28/2019 4.00  3.80 - 5.20 M/uL Final    HGB 02/28/2019 12.6  11.7 - 16.0 g/dL Final    HCT 02/28/2019 39.3  35.1 - 48.0 % Final    MCV 02/28/2019 98* 80 - 95 fL Final    MCH 02/28/2019 32  26 - 34 pg Final    MCHC 02/28/2019 32  31 - 36 g/dL Final    RDW 02/28/2019 13.9  10.0 - 15.5 % Final    PLATELET 86/25/2835 227  140 - 440 K/uL Final    MPV 02/28/2019 10.1  9.0 - 13.0 fL Final    NEUTROPHILS 02/28/2019 60  40 - 75 % Final    Lymphocytes 02/28/2019 26  20 - 45 % Final    MONOCYTES 02/28/2019 9  3 - 12 % Final    EOSINOPHILS 02/28/2019 4  0 - 6 % Final    BASOPHILS 02/28/2019 1  0 - 2 % Final    ABS. NEUTROPHILS 02/28/2019 3.1  1.8 - 7.7 K/uL Final    ABSOLUTE LYMPHOCYTE COUNT 02/28/2019 1.4  1.0 - 4.8 K/uL Final    ABS. MONOCYTES 02/28/2019 0.5  0.1 - 1.0 K/uL Final    ABS. EOSINOPHILS 02/28/2019 0.2  0.0 - 0.5 K/uL Final    ABS. BASOPHILS 02/28/2019 0.0  0.0 - 0.2 K/uL Final    RHEUMATOID FACTOR QUANT, IMMUNOTUR* 02/28/2019 <20  0 - 20 IU/mL Final    Sjogren's Anti-SS-A 02/28/2019 <0.2  <1.0 AI Final    Sjogren's Anti-SS-B 02/28/2019 <0.2  <1.0 AI Final    JO1 ANTIBODY 02/28/2019 <0.2  <1.0 AI Final    RNP Abs 02/28/2019 0.7  <1.0 AI Final    Scleroderma Ab (SCL-70) 02/28/2019 <0.2  <1.0 AI Final    CENTROMERE B ANTIBODY 02/28/2019 >8.0* <1.0 AI Final    Smith Abs 02/28/2019 <0.2  <1.0 AI Final    CHROMATIN ANTIBODY 02/28/2019 <0.2  <1.0 AI Final    ANTI-DNA (DS) AB QN 02/28/2019 <1  <=4 IU/ml Final    Comment: Autoimmune connective tissue diseases may present with similar symptoms,   making  diagnosis difficult. The DEBORAH comprehensive tests performed by multiplex flow  immunoassay determine specific auto-antibodies.    The results of these tests  correlated with clinical evaluation may aid in the diagnosis of connective  tissue diseases, including: Scleroderma, Sj gren syndrome, systemic lupus  erythematosus, mixed connective tissue disease, CREST syndrome, and  polymyositis. DEBORAH antibodies are the most common antibodies and may be   present  prior to disease onset. Although certain antibodies show specificity for  certain disease, DEBORAH antibodies are not specific for connective tissue disease  and may be present due to cancer, infectious diseases, or advanced age. A  negative DEBORAH comprehensive result does not preclude the presence of connective  tissue disease. Antibody           Abnormal      Associated Disease                      Value  SS-A/SS-B Abs      >=1.0 AI      Supports diagnos                           is of Sjogren syndrome or                                    other connective tissue disease  Jaci-1 Abs           >=1.0 AI      Supports diagnosis of polymyositis  RNP Abs            >=1.0 AI      Supports diagnosis of mixed connective tissue  disease  Scl-70 Abs         >=1.0 AI      Supports diagnosis of scleroderma  Centromere B Abs   >=1.0 AI      Supports diagnosis of CREST Syndrome  Israel Abs          >=1.0 AI      Supports diagnosis of systemic lupus  erythematosus  Chromatin Abs      >=1.0 AI      Supports diagnosis of systemic lupus  erythematosus  dsDNA Abs         >=10.0 IU/ml   Supports diagnosis of systemic lupus  erythematosus;                                    values between 5-9 IU/ml are indeterminate      C-Reactive Protein, Qt 02/28/2019 0.2  0.0 - 0.5 mg/dL Final          Follow-up and Dispositions    · Return for as needed.
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
## 2019-09-09 DIAGNOSIS — L29.9 PRURITUS: Primary | ICD-10-CM

## 2019-09-09 DIAGNOSIS — B86 SCABIES INFESTATION: ICD-10-CM

## 2019-09-09 NOTE — TELEPHONE ENCOUNTER
Pt called stating the medications she was prescribed for Scabies is not helping. Pt stated there are no signs of improvement and still having the same symptoms. Would like to know if something else can be prescribed or any other alternative treatment.  Please f/u

## 2019-09-11 RX ORDER — IVERMECTIN 3 MG/1
3 TABLET ORAL ONCE
Qty: 1 TAB | Refills: 0 | Status: SHIPPED | OUTPATIENT
Start: 2019-09-11 | End: 2019-09-11

## 2019-09-11 NOTE — TELEPHONE ENCOUNTER
Called patient to inform her that PCP can resend another rx to the pharmacy. Patient stated that it helped a little. Patient also would like a referral to dermatology.

## 2019-09-18 ENCOUNTER — TELEPHONE (OUTPATIENT)
Dept: FAMILY MEDICINE CLINIC | Age: 59
End: 2019-09-18

## 2019-09-18 NOTE — TELEPHONE ENCOUNTER
Pt called requesting a referral to see Dermatology in regards to the rash she was seen in office for on 9/4/19 that has still not gone away. Please advise.

## 2019-12-05 ENCOUNTER — OFFICE VISIT (OUTPATIENT)
Dept: FAMILY MEDICINE CLINIC | Age: 59
End: 2019-12-05

## 2019-12-05 VITALS
BODY MASS INDEX: 23.7 KG/M2 | WEIGHT: 151 LBS | OXYGEN SATURATION: 95 % | HEIGHT: 67 IN | DIASTOLIC BLOOD PRESSURE: 62 MMHG | RESPIRATION RATE: 16 BRPM | HEART RATE: 62 BPM | SYSTOLIC BLOOD PRESSURE: 108 MMHG | TEMPERATURE: 96.8 F

## 2019-12-05 DIAGNOSIS — L30.9 DERMATITIS: ICD-10-CM

## 2019-12-05 DIAGNOSIS — L02.512 ABSCESS OF LEFT MIDDLE FINGER: Primary | ICD-10-CM

## 2019-12-05 RX ORDER — TRIAMCINOLONE ACETONIDE 1 MG/G
OINTMENT TOPICAL 2 TIMES DAILY
Qty: 30 G | Refills: 0 | Status: SHIPPED | OUTPATIENT
Start: 2019-12-05 | End: 2020-02-12

## 2019-12-05 RX ORDER — SULFAMETHOXAZOLE AND TRIMETHOPRIM 800; 160 MG/1; MG/1
1 TABLET ORAL 2 TIMES DAILY
Qty: 20 TAB | Refills: 0 | Status: SHIPPED | OUTPATIENT
Start: 2019-12-05 | End: 2019-12-15

## 2019-12-05 NOTE — PROGRESS NOTES
SUBJECTIVE:  Nikole Dominguez is a 60y.o. year old female   Chief Complaint   Patient presents with    Hand Problem     Left middle finger    Rash     Rt forearm       History of Present Illness:     She is here because she has painful swelling at the tip of her left middle finger for about 2 weeks. She had a little scratch at the tip of the finger before it started. She works in [employer]. She has been putting Neosporin on it. It is gradually getting worse. She also has a pruritic rash on her right forearm the last few weeks. She has been using antifungal cream on it; but it is not getting better. Patient denies any other complaints. She answers \"no\" to the rest of the review of system. Past Medical History:   Diagnosis Date    Back pain      Past Surgical History:   Procedure Laterality Date    HX CERVICAL DISKECTOMY      HX CHOLECYSTECTOMY      HX HERNIA REPAIR  08/19/2019        No current outpatient medications on file. No current facility-administered medications for this visit. Allergies   Allergen Reactions    Keflex [Cephalexin] Rash          Review of Systems:   Constitutional: No fever, chills, night sweats, malaise, dizziness. Cardiovascular: No angina, palpitations, PND, orthopnea, lightheadedness, edema, claudication. Respiratory: No dyspnea, wheeze, pleurisy, hemoptysis, unusual cough or sputum. Gastrointestinal: No nausea/ vomiting, bowel habit change, melena, hematochezia, anorexia. Musculoskeletal: Pain and swelling at the tip of left index finger. No other joint swelling/pain, instability, focal weakness, stiffness/rigidity, radicular pain. Psychiatric: No agitation, confusion/disorientation, suicidal or homicidal ideation. Skin: Pruritic rash on right forearm. Otherwise no complaint. OBJECTIVE:  Physical Exam:   Constitutional: General Appearance:  well developed, well nourished, nontoxic, in no acute distress.    Visit Vitals  /62 (BP 1 Location: Left arm, BP Patient Position: Sitting)   Pulse 62   Temp 96.8 °F (36 °C) (Temporal)   Resp 16   Ht 5' 7\" (1.702 m)   Wt 151 lb (68.5 kg)   SpO2 95%   BMI 23.65 kg/m²     Pulmonary: Respiratory effort: normal; no dyspnea, no retractions, no accessory muscle use. Auscultation: normal & symmetrical air exchange; no rales, no rhonchi, no wheeze; no rubs    Cardiovascular: Auscultation: RRR; no murmur, rubs or gallops. Extremities: no edema, no active varicosity. Psychiatric: Oriented to time, place and person. Musculoskeletal:   NC/AT. Neck-supple. Left middle finger: At the tip there is a small (<1 cm) superficial abscess with surrounding redness, tenderness and induration. Skin: Mild papulosquamous rash on proximal right forearm. No induration, heat, redness, or tenderness. ASSESSMENT:     1. Abscess of left middle finger    2. Dermatitis        PLAN:     Orders Placed This Encounter    trimethoprim-sulfamethoxazole (BACTRIM DS, SEPTRA DS) 160-800 mg per tablet    triamcinolone acetonide (KENALOG) 0.1 % ointment     Pharmacologic Management: Medications reviewed with the patient. Bactrim DS twice daily. Triamcinolone 0.1% ointment twice daily. Warm compress of the abscess. Patient refused incision and drainage of the abscess. She wants to try conservative care first and she will follow-up if not getting better. Discussed skin care and abscess care. Discussed smoking cessation. Discussed DDx, follow-up & work-up. Discussed risk/benefit & side effect of treatment. Follow up visit as per PCP, prn sooner. Health risk from non adherence discussed. Patient/ voiced understanding.  reviewed with patient. She is to follow up with her PCP. Follow-up and Dispositions    · Return if symptoms worsen or fail to improve.        Renata Cartagena MD

## 2020-02-12 ENCOUNTER — OFFICE VISIT (OUTPATIENT)
Dept: FAMILY MEDICINE CLINIC | Age: 60
End: 2020-02-12

## 2020-02-12 ENCOUNTER — TELEPHONE (OUTPATIENT)
Dept: FAMILY MEDICINE CLINIC | Age: 60
End: 2020-02-12

## 2020-02-12 VITALS
OXYGEN SATURATION: 95 % | HEART RATE: 59 BPM | SYSTOLIC BLOOD PRESSURE: 147 MMHG | RESPIRATION RATE: 16 BRPM | HEIGHT: 67 IN | TEMPERATURE: 97.2 F | DIASTOLIC BLOOD PRESSURE: 61 MMHG | BODY MASS INDEX: 23.54 KG/M2 | WEIGHT: 150 LBS

## 2020-02-12 DIAGNOSIS — R05.9 COUGH: ICD-10-CM

## 2020-02-12 DIAGNOSIS — R06.2 WHEEZING: ICD-10-CM

## 2020-02-12 DIAGNOSIS — G54.2 CERVICAL NERVE ROOT IMPINGEMENT: ICD-10-CM
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
## 2020-02-12 DIAGNOSIS — M54.2 NECK PAIN: ICD-10-CM

## 2020-02-12 DIAGNOSIS — M06.9 RHEUMATOID ARTHRITIS INVOLVING MULTIPLE SITES, UNSPECIFIED RHEUMATOID FACTOR PRESENCE: Primary | ICD-10-CM

## 2020-02-12 DIAGNOSIS — J40 BRONCHITIS: ICD-10-CM

## 2020-02-12 PROBLEM — Z86.79 HISTORY OF HYPERTENSION: Status: RESOLVED | Noted: 2019-05-09 | Resolved: 2020-02-12

## 2020-02-12 PROBLEM — Z96.642 STATUS POST TOTAL REPLACEMENT OF LEFT HIP: Status: RESOLVED | Noted: 2019-05-10 | Resolved: 2020-02-12

## 2020-02-12 PROBLEM — M25.552 CHRONIC LEFT HIP PAIN: Status: RESOLVED | Noted: 2019-02-13 | Resolved: 2020-02-12

## 2020-02-12 PROBLEM — G89.29 CHRONIC LEFT HIP PAIN: Status: RESOLVED | Noted: 2019-02-13 | Resolved: 2020-02-12

## 2020-02-12 PROBLEM — I10 ESSENTIAL HYPERTENSION: Status: ACTIVE | Noted: 2020-02-12

## 2020-02-12 PROBLEM — I73.00 RAYNAUD'S DISEASE WITHOUT GANGRENE: Status: ACTIVE | Noted: 2020-02-12

## 2020-02-12 RX ORDER — HYDROGEN PEROXIDE 3 %
SOLUTION, NON-ORAL MISCELLANEOUS
COMMUNITY

## 2020-02-12 RX ORDER — AZITHROMYCIN 250 MG/1
TABLET, FILM COATED ORAL
Qty: 6 TAB | Refills: 0 | Status: SHIPPED | OUTPATIENT
Start: 2020-02-12 | End: 2020-02-17

## 2020-02-12 RX ORDER — METHOCARBAMOL 500 MG/1
500 TABLET, FILM COATED ORAL 4 TIMES DAILY
Qty: 30 TAB | Refills: 2 | Status: SHIPPED | OUTPATIENT
Start: 2020-02-12 | End: 2020-12-31

## 2020-02-12 RX ORDER — HYDROXYCHLOROQUINE SULFATE 200 MG/1
200 TABLET, FILM COATED ORAL 2 TIMES DAILY
COMMUNITY
Start: 2020-02-07

## 2020-02-12 RX ORDER — ALBUTEROL SULFATE 90 UG/1
1 AEROSOL, METERED RESPIRATORY (INHALATION)
Qty: 1 INHALER | Refills: 0 | Status: SHIPPED | OUTPATIENT
Start: 2020-02-12 | End: 2020-03-08

## 2020-02-12 RX ORDER — IBUPROFEN 600 MG/1
TABLET ORAL
COMMUNITY
End: 2021-09-03

## 2020-02-12 RX ORDER — HYDROCODONE POLISTIREX AND CHLORPHENIRAMINE POLISTIREX 10; 8 MG/5ML; MG/5ML
5 SUSPENSION, EXTENDED RELEASE ORAL
Qty: 50 ML | Refills: 0 | Status: SHIPPED | OUTPATIENT
Start: 2020-02-12 | End: 2020-02-13

## 2020-02-12 NOTE — TELEPHONE ENCOUNTER
I called pt and she states that pharmacy said Tussionex needs PA.  I informed pt that I have not received anything yet but told her she will need to tell pharmacy that they need to send us something stating needs PA so we can initiate PA> Thanks

## 2020-02-12 NOTE — PROGRESS NOTES
Chief Complaint   Patient presents with    Establish Care    Cough     x 3 weeks- Is a smoker    Neck Pain     x 3 weeks- Tingling sensation         1. Have you been to the ER, urgent care clinic since your last visit? Hospitalized since your last visit? 2. Have you seen or consulted any other health care providers outside of the [address] [place] [address] since your last visit? Include any pap smears or colon screening.  Rheumatologist

## 2020-02-12 NOTE — TELEPHONE ENCOUNTER
Patient called stating that the pharmacy told her that she needs prior authorization for two medications that was sent today, one being the tussionex. Patient stated they didn't give her the name of the second one that needs prior authorization.

## 2020-02-12 NOTE — PROGRESS NOTES
Adelaida Victoria. [address]      Annetta Mckeon is a 60 y.o. female and presents with Establish Care; Cough (x 3 weeks- Is a smoker); and Neck Pain (x 3 weeks- Tingling sensation)       Assessment/Plan:    Diagnoses and all orders for this visit:    1. Rheumatoid arthritis involving multiple sites, unspecified rheumatoid factor presence (HCC)  Currently on Plaquenil, managed by rheumatology    2. Fall, initial encounter  Pooja Lauren about 8 days ago, states she did hit her head with no loss of consciousness  She is still experiencing some neck pain    3. Neck pain  -     XR SPINE CERV PA LAT ODONT 3 V MAX; Future  -     methocarbamol (ROBAXIN) 500 mg tablet; Take 1 Tab by mouth four (4) times daily. We will get an x-ray of her neck related to the pain status post fall  Prescription for muscle relaxers given    4. Bronchitis  -     azithromycin (ZITHROMAX) 250 mg tablet; Take 2 tablets today, then take 1 tablet daily  -     albuterol (PROVENTIL HFA, VENTOLIN HFA, PROAIR HFA) 90 mcg/actuation inhaler; Take 1 Puff by inhalation every four to six (4-6) hours as needed for Wheezing. Complaints of cough, most likely bronchitis, prescription for antibiotic given since this is been going on for about 3 weeks for albuterol inhaler given    5. Wheezing  -     azithromycin (ZITHROMAX) 250 mg tablet; Take 2 tablets today, then take 1 tablet daily    6. Cough        Follow-up and Dispositions    · Return in about 4 weeks (around 3/11/2020) for Annual physical, w/o gyn, 30 minutes.          Subjective:    Here today to establish care, prior patient of [address] [place] [address]    Fall  Onset: 8 days ago  Chacteristics: walking back from store, fell while walking on uneven sidewalk, hit left side of head and acquired bruise on eye,  Treatment: ice, did not seek medical attention    Neck pain  Onset: about a month  Location: posterior right side and goes down toward shoulder  Duration: pain is constant  Chacterisitcs: cannot recall exactly when the pain started, the pain is accompanied by tingling, tingling does not radiate, stays in the one spot, had surgery in 1991, does not recall any activities that  Could contribute to the pain  Aggrivating: turning her head  Relieving: nothing  Treatments: ibuprofen    Cough  Onset three weeks ago  Productive of a little mucous, light yellow  No fever  No sinus congestion, ear or eye problems  No heacache  Treatments: mucinex, delsyum: made her blood pressure go up      ROS: As stated in HPI, otherwise all others negative. ROS    The problem list was updated as a part of today's visit. Patient Active Problem List   Diagnosis Code    Tobacco use Z72.0    Degenerative joint disease (DJD) of hip M16.9    History of DVT (deep vein thrombosis) Z86.718    Rheumatoid arthritis involving multiple sites ([facility] [address] [address] M06.9    Raynaud's disease without gangrene I73.00    Essential hypertension I10       The PSH, FH were reviewed. SH:  Social History     Tobacco Use    Smoking status: Current Every Day Smoker     Packs/day: 1.00     Years: 25.00     Pack years: 25.00    Smokeless tobacco: Never Used   Substance Use Topics    Alcohol use: No     Frequency: Never    Drug use: No       Medications/Allergies:  Current Outpatient Medications on File Prior to Visit   Medication Sig Dispense Refill    hydroxychloroquine (PLAQUENIL) 200 mg tablet       ibuprofen (MOTRIN) 600 mg tablet Take  by mouth every six (6) hours as needed for Pain.  esomeprazole (NEXIUM) 20 mg capsule Take  by mouth. No current facility-administered medications on file prior to visit.          Allergies   Allergen Reactions    Keflex [Cephalexin] Rash       Objective:  Visit Vitals  /61   Pulse (!) 59   Temp 97.2 °F (36.2 °C) (Oral)   Resp 16   Ht 5' 7\" (1.702 m)   Wt 150 lb (68 kg)   SpO2 95%   BMI 23.49 kg/m²    Body mass index is 23.49 kg/m². Physical assessment  Physical Exam      Labwork and Ancillary Studies:    CBC w/Diff  Lab Results   Component Value Date/Time    WBC 5.2 02/28/2019 01:53 PM    HGB 12.6 02/28/2019 01:53 PM    PLATELET 661 60/63/5087 01:53 PM         Basic Metabolic Profile  Lab Results   Component Value Date/Time    Sodium 140 10/15/2017 02:49 AM    Potassium 3.5 10/15/2017 02:49 AM    Chloride 103 10/15/2017 02:49 AM    CO2 27 10/15/2017 02:40 AM    CO2, TOTAL 29 10/15/2017 02:49 AM    Glucose 118 (H) 10/15/2017 02:49 AM    BUN 14 10/15/2017 02:49 AM    Creatinine 0.6 10/15/2017 02:49 AM    GFR est AA >60.0 10/15/2017 02:40 AM    GFR est non-AA >60 10/15/2017 02:40 AM    Calcium 9.1 10/15/2017 02:40 AM        Cholesterol  No results found for: CHOL, CHOLX, CHLST, CHOLV, HDL, HDLP, LDL, LDLC, DLDLP, TGLX, TRIGL, TRIGP, CHHD, [facility]    Health Maintenance:   Health Maintenance   Topic Date Due    Hepatitis C Screening  1960    Pneumococcal 0-64 years (1 of 1 - PPSV23) 08/21/1965    DTaP/Tdap/Td series (1 - Tdap) 08/21/1970    PAP AKA CERVICAL CYTOLOGY  08/21/1980    Lipid Screen  08/21/1999    Shingrix Vaccine Age 50> (1 of 2) 08/21/2009    Breast Cancer Screen Mammogram  08/21/2009    FOBT Q1Y Age 54-65  08/21/2009    Influenza Age 5 to Adult  08/01/2019       I have discussed the diagnosis with the patient and the intended plan as seen in the above orders. The patient has received an After-Visit Summary and questions were answered concerning future plans. An After Visit Summary was printed and given to the patient. All diagnosis have been discussed with the patient and all of the patient's questions have been answered. Follow-up and Dispositions    · Return in about 4 weeks (around 3/11/2020) for Annual physical, w/o gyn, 30 minutes. KALA PalmP-BC  [address] [facility]   [address] [facility] [address]  [id number]

## 2020-02-12 NOTE — PATIENT INSTRUCTIONS
Bronchitis: Care Instructions  Your Care Instructions    Bronchitis is inflammation of the bronchial tubes, which carry air to the lungs. The tubes swell and produce mucus, or phlegm. The mucus and inflamed bronchial tubes make you cough. You may have trouble breathing. Most cases of bronchitis are caused by viruses like those that cause colds. Antibiotics usually do not help and they may be harmful. Bronchitis usually develops rapidly and lasts about 2 to 3 weeks in otherwise healthy people. Follow-up care is a key part of your treatment and safety. Be sure to make and go to all appointments, and call your doctor if you are having problems. It's also a good idea to know your test results and keep a list of the medicines you take. How can you care for yourself at home? · Take all medicines exactly as prescribed. Call your doctor if you think you are having a problem with your medicine. · Get some extra rest.  · Take an over-the-counter pain medicine, such as acetaminophen (Tylenol), ibuprofen (Advil, Motrin), or naproxen (Aleve) to reduce fever and relieve body aches. Read and follow all instructions on the label. · Do not take two or more pain medicines at the same time unless the doctor told you to. Many pain medicines have acetaminophen, which is Tylenol. Too much acetaminophen (Tylenol) can be harmful. · Take an over-the-counter cough medicine that contains dextromethorphan to help quiet a dry, hacking cough so that you can sleep. Avoid cough medicines that have more than one active ingredient. Read and follow all instructions on the label. · Breathe moist air from a humidifier, hot shower, or sink filled with hot water. The heat and moisture will thin mucus so you can cough it out. · Do not smoke. Smoking can make bronchitis worse. If you need help quitting, talk to your doctor about stop-smoking programs and medicines. These can increase your chances of quitting for good.   When should you call for help? Call 911 anytime you think you may need emergency care. For example, call if:    · You have severe trouble breathing.    Call your doctor now or seek immediate medical care if:    · You have new or worse trouble breathing.     · You cough up dark brown or bloody mucus (sputum).     · You have a new or higher fever.     · You have a new rash.    Watch closely for changes in your health, and be sure to contact your doctor if:    · You cough more deeply or more often, especially if you notice more mucus or a change in the color of your mucus.     · You are not getting better as expected. Where can you learn more? Go to http://wilber-nedra.info/. Enter H333 in the search box to learn more about \"Bronchitis: Care Instructions. \"  Current as of: June 9, 2019  Content Version: 12.2  © 0342-2428 [facility]. Care instructions adapted under license by [employer] (which disclaims liability or warranty for this information). If you have questions about a medical condition or this instruction, always ask your healthcare professional. [address] any warranty or liability for your use of this information.

## 2020-02-13 DIAGNOSIS — J40 BRONCHITIS: ICD-10-CM

## 2020-02-13 DIAGNOSIS — R05.9 COUGH: Primary | ICD-10-CM

## 2020-02-13 RX ORDER — HYDROCODONE BITARTRATE AND HOMATROPINE METHYLBROMIDE 5; 1.5 MG/1; MG/1
1 TABLET ORAL
Qty: 12 EACH | Refills: 0 | Status: SHIPPED | OUTPATIENT
Start: 2020-02-13 | End: 2020-02-16

## 2020-02-13 NOTE — PROGRESS NOTES
Pt notified of med change per Claudio Kulkarni due to Tussionex NOT being covered by insurance.  Thanks

## 2020-03-02 ENCOUNTER — TELEPHONE (OUTPATIENT)
Dept: FAMILY MEDICINE CLINIC | Age: 60
End: 2020-03-02

## 2020-03-02 NOTE — TELEPHONE ENCOUNTER
Called and spoke with patient, explained to her and x-ray yields very little information and that is the reason why I have ordered an MRI, pending those results will help determine the next step we should take, have asked her to make an appointment with me 1 week after she gets her MRI done so we can review the results  All diagnosis have been discussed with the patient and all of the patient's questions have been answered.

## 2020-03-02 NOTE — TELEPHONE ENCOUNTER
----- Message from Alray Plate sent at 3/2/2020  3:24 PM EST -----    ----- Message -----  From: Amador Perez NP  Sent: 3/2/2020   1:56 PM EST  To: Alray Plate    Please let her know the spine x-ray showed some cervical cord impingement, an MRI has been ordered to better evaluate this

## 2020-03-08 DIAGNOSIS — J40 BRONCHITIS: ICD-10-CM

## 2020-03-08 RX ORDER — ALBUTEROL SULFATE 90 UG/1
AEROSOL, METERED RESPIRATORY (INHALATION)
Qty: 8.5 G | Refills: 0 | Status: SHIPPED | OUTPATIENT
Start: 2020-03-08 | End: 2020-04-05

## 2020-03-10 ENCOUNTER — HOSPITAL ENCOUNTER (OUTPATIENT)
Dept: MRI IMAGING | Age: 60
Discharge: HOME | End: 2020-03-10
Attending: NURSE PRACTITIONER
Payer: MEDICAID

## 2020-03-10 DIAGNOSIS — G54.2 CERVICAL NERVE ROOT IMPINGEMENT: ICD-10-CM

## 2020-03-10 DIAGNOSIS — M54.2 NECK PAIN: ICD-10-CM

## 2020-03-10 PROCEDURE — 72141 MRI NECK SPINE W/O DYE: CPT

## 2020-03-13 PROBLEM — F41.9 ANXIETY: Status: ACTIVE | Noted: 2020-03-13

## 2020-04-05 DIAGNOSIS — J40 BRONCHITIS: ICD-10-CM

## 2020-04-05 RX ORDER — ALBUTEROL SULFATE 90 UG/1
AEROSOL, METERED RESPIRATORY (INHALATION)
Qty: 8.5 G | Refills: 0 | Status: SHIPPED | OUTPATIENT
Start: 2020-04-05 | End: 2020-08-09

## 2020-04-22 ENCOUNTER — VIRTUAL VISIT (OUTPATIENT)
Dept: FAMILY MEDICINE CLINIC | Age: 60
End: 2020-04-22

## 2020-04-22 NOTE — PROGRESS NOTES
1. Have you been to the ER, urgent care clinic since your last visit? Hospitalized since your last visit? No    2. Have you seen or consulted any other health care providers outside of the [address] [place] [address] since your last visit? Include any pap smears or colon screening. No    Chief Complaint   Patient presents with    Tingling     tingling sensation on her neck that radiated down to her right shoulder for couple of months.

## 2020-04-22 NOTE — PROGRESS NOTES
Lyndsay [address] 
             [phone] Consent: Deborah Fraga, who was seen by synchronous (real-time) audio-video technology, and/or her healthcare decision maker, is aware that this patient-initiated, Telehealth encounter on 4/22/2020 is a billable service, with coverage as determined by her insurance carrier. She is aware that she may receive a bill and has provided verbal consent to proceed: {YES/NO/NA-Consent obtained within past 12 months:12672}. Assessment & Plan:  
{There are no diagnoses linked to this encounter. (Refresh or delete this SmartLink)} {Time-based coding - will disappear if no selections made (Optional):57780::\"I spent at least 15 minutes with this established patient, and >50% of the time was spent counseling and/or coordinating care regarding ***\":0} 
712 Subjective:  
Deborah Fraga is a 60 y.o. female who was seen for Tingling (tingling sensation on her neck that radiated down to her right shoulder for couple of months. ) Prior to Admission medications Medication Sig Start Date End Date Taking? Authorizing Provider ProAir HFA 90 mcg/actuation inhaler INHALE 1 PUFF BY MOUTH EVERY 4 TO 6 HOURS AS NEEDED FOR WHEEZING 4/5/20  Yes Sabrina Lui NP  
ibuprofen (MOTRIN) 600 mg tablet Take  by mouth every six (6) hours as needed for Pain. Yes Provider, Historical  
esomeprazole (NEXIUM) 20 mg capsule Take  by mouth. Yes Provider, Historical  
hydroxychloroquine (PLAQUENIL) 200 mg tablet  2/7/20   Provider, Historical  
methocarbamol (ROBAXIN) 500 mg tablet Take 1 Tab by mouth four (4) times daily. 2/12/20   Sabrina Lui NP Allergies Allergen Reactions  Keflex [Cephalexin] Rash  
 
 
{History SmartLink choices - disappears if left unselected (Optional):36311} ROS Objective:  
Vital Signs: (As obtained by patient/caregiver at home) There were no vitals taken for this visit. [INSTRUCTIONS:  \"[x]\" Indicates a positive item  \"[]\" Indicates a negative item  -- DELETE ALL ITEMS NOT EXAMINED] Constitutional: [x] Appears well-developed and well-nourished [x] No apparent distress   
  [] Abnormal - Mental status: [x] Alert and awake  [x] Oriented to person/place/time [x] Able to follow commands   
[] Abnormal - Eyes:   EOM    [x]  Normal    [] Abnormal -  
Sclera  [x]  Normal    [] Abnormal - 
        Discharge [x]  None visible   [] Abnormal - HENT: [x] Normocephalic, atraumatic  [] Abnormal -  
[x] Mouth/Throat: Mucous membranes are moist 
 
External Ears [x] Normal  [] Abnormal - Neck: [x] No visualized mass [] Abnormal - Pulmonary/Chest: [x] Respiratory effort normal   [x] No visualized signs of difficulty breathing or respiratory distress 
      [] Abnormal - Musculoskeletal:   [x] Normal gait with no signs of ataxia [x] Normal range of motion of neck [] Abnormal -  
 
Neurological:        [x] No Facial Asymmetry (Cranial nerve 7 motor function) (limited exam due to video visit) [x] No gaze palsy  
     [] Abnormal -   
     
Skin:        [x] No significant exanthematous lesions or discoloration noted on facial skin   
     [] Abnormal - Psychiatric:       [x] Normal Affect [] Abnormal -  
     [x] No Hallucinations Other pertinent observable physical exam findings:- 
 
 
 
We discussed the expected course, resolution and complications of the diagnosis(es) in detail. Medication risks, benefits, costs, interactions, and alternatives were discussed as indicated. I advised her to contact the office if her condition worsens, changes or fails to improve as anticipated. She expressed understanding with the diagnosis(es) and plan. Nemo Turk is a 60 y.o. female being evaluated by a video visit encounter for concerns as above.   A caregiver was present when appropriate. Due to this being a TeleHealth encounter (During MIX-30 public health emergency), evaluation of the following organ systems was limited: Vitals/Constitutional/EENT/Resp/CV/GI//MS/Neuro/Skin/Heme-Lymph-Imm. Pursuant to the emergency declaration under the [address] [place] [address] waiver authority and the [employer] and [employer] Act, this Virtual  Visit was conducted, with patient's (and/or legal guardian's) consent, to reduce the patient's risk of exposure to COVID-19 and provide necessary medical care. Services were provided through a video synchronous discussion virtually to substitute for in-person clinic visit. Patient and provider were located at their individual homes. Bravo Gamboa NP An After Visit Summary was printed and given to the patient. All diagnosis have been discussed with the patient and all of the patient's questions have been answered. Carlos Weaver, [facility] [employer] [address] [facility] [address]

## 2020-04-24 ENCOUNTER — VIRTUAL VISIT (OUTPATIENT)
Dept: FAMILY MEDICINE CLINIC | Age: 60
End: 2020-04-24

## 2020-04-24 DIAGNOSIS — G54.2 CERVICAL NERVE ROOT IMPINGEMENT: Primary | ICD-10-CM

## 2020-04-24 NOTE — PROGRESS NOTES
A user error has taken place: encounter opened in error, closed for administrative reasons  Pt unable to access video.

## 2020-04-24 NOTE — PROGRESS NOTES
1. Have you been to the ER, urgent care clinic since your last visit? Hospitalized since your last visit? No    2. Have you seen or consulted any other health care providers outside of the [address] [place] [address] since your last visit? Include any pap smears or colon screening. No    Chief Complaint   Patient presents with    Tingling     neck that radiates down to her right shoulder.

## 2020-04-24 NOTE — PROGRESS NOTES
Adelaida Victoria. [address]    Fay Ferrara is a 60 y.o. female evaluated via telephone on 4/24/2020. Consent:  She and/or health care decision maker is aware that she may receive a bill for this telephone service, depending on her insurance coverage, and has provided verbal consent to proceed: Yes  Due to an inability to access video on her phone she has requested a phone visit. Documentation:  I communicated with the patient and/or health care decision maker about her complaints of numbness and tingling in her neck radiating to her shoulder. She indicated this is the same problem she has been having since her last visit with me in February 2020. At that time we proceeded with a x-ray of the C-spine and eventually an MRI which showed cervical nerve root impingement. At that time she was referred to neurosurgery for further evaluation. Ms. Patricia Brewer was informed of this referral, she states she never heard anything from the neurosurgeon. Details of this discussion including any medical advice provided: I reminded Ms. Lizzette Jang of the prior events related to her signs and symptoms, she acknowledged remembering getting the test done but does not remember getting a referral to neurosurgery and states she never got a call although she may have missed it. I gave her the phone number of the neurosurgeon we have referred her to asked her to call them to make an appointment. Informed her if she has any problems getting an appointment with them or if they need any further information either she or their office can call us here and will be happy to help out in any way we can. I affirm this is a Patient Initiated Episode with an Established Patient who has not had a related appointment within my department in the past 7 days or scheduled within the next 24 hours.     Total Time: minutes: 5-10 minutes    Note: not billable if this call serves to triage the patient into an appointment for the relevant concern      Ulisses Hermosillo NP       An After Visit Summary was printed and given to the patient. All diagnosis have been discussed with the patient and all of the patient's questions have been answered. Marilee Berrios, [facility]   [address]

## 2020-04-27 NOTE — PROGRESS NOTES
A user error has taken place: encounter opened in error, closed for administrative reasons, unable to reach by phone.

## 2020-08-09 DIAGNOSIS — J40 BRONCHITIS: ICD-10-CM

## 2020-08-09 RX ORDER — ALBUTEROL SULFATE 90 UG/1
AEROSOL, METERED RESPIRATORY (INHALATION)
Qty: 8.5 G | Refills: 0 | Status: SHIPPED | OUTPATIENT
Start: 2020-08-09 | End: 2020-11-29

## 2020-11-27 DIAGNOSIS — J40 BRONCHITIS: ICD-10-CM

## 2020-11-29 RX ORDER — ALBUTEROL SULFATE 90 UG/1
AEROSOL, METERED RESPIRATORY (INHALATION)
Qty: 8.5 G | Refills: 0 | Status: SHIPPED | OUTPATIENT
Start: 2020-11-29 | End: 2021-01-04 | Stop reason: SDUPTHER

## 2020-12-18 ENCOUNTER — TELEPHONE (OUTPATIENT)
Dept: FAMILY MEDICINE CLINIC | Age: 60
End: 2020-12-18

## 2020-12-18 NOTE — TELEPHONE ENCOUNTER
Patients niece is COVID Positive, she is requesting an order for a COVID Test, call her back at [phone]

## 2020-12-31 ENCOUNTER — VIRTUAL VISIT (OUTPATIENT)
Dept: FAMILY MEDICINE CLINIC | Age: 60
End: 2020-12-31
Payer: MEDICAID

## 2020-12-31 DIAGNOSIS — I73.00 RAYNAUD'S DISEASE WITHOUT GANGRENE: Primary | ICD-10-CM

## 2020-12-31 PROCEDURE — 99213 OFFICE O/P EST LOW 20 MIN: CPT | Performed by: NURSE PRACTITIONER

## 2020-12-31 RX ORDER — AMLODIPINE BESYLATE 2.5 MG/1
1 TABLET ORAL DAILY
COMMUNITY
Start: 2020-05-27 | End: 2020-12-31

## 2020-12-31 RX ORDER — CELECOXIB 100 MG/1
CAPSULE ORAL
COMMUNITY
Start: 2020-10-20

## 2020-12-31 RX ORDER — AMLODIPINE BESYLATE 5 MG/1
5 TABLET ORAL DAILY
Qty: 90 TAB | Refills: 0 | Status: SHIPPED | OUTPATIENT
Start: 2020-12-31 | End: 2021-05-21 | Stop reason: SDUPTHER

## 2020-12-31 NOTE — PROGRESS NOTES
Chief Complaint   Patient presents with   [address] [place] [address] Annual Wellness Visit       1. Have you been to the ER, urgent care clinic since your last visit? Hospitalized since your last visit? No    2. Have you seen or consulted any other health care providers outside of the [address] [place] [address] since your last visit? Include any pap smears or colon screening.  Neuro- jan 13th , rheumatology

## 2020-12-31 NOTE — PROGRESS NOTES
Adelaida Victoria. [address]      Tess Ivan is a 63 y.o. female who was seen by synchronous (real-time) audio-video technology on 12/31/2020. Consent: Tess Ivan, who was seen by synchronous (real-time) audio-video technology, and/or her healthcare decision maker, is aware that this patient-initiated, Telehealth encounter on 12/31/2020 is a billable service, with coverage as determined by her insurance carrier. She is aware that she may receive a bill and has provided verbal consent to proceed: Yes. Assessment & Plan:   Diagnoses and all orders for this visit:    1. Raynaud's disease without gangrene  -     amLODIPine (NORVASC) 5 mg tablet; Take 1 Tab by mouth daily. Visit today was to be for a physical which she states she needs in order to keep her health insurance, however I explained to the patient this cannot be done on a virtual visit and we will make an appointment for her to come into the office    She has been going to rheumatology and was started on amlodipine for her Raynaud's disease, she states she is getting very good relief on her right hand but continues to have problems with her left hand. Will increase the amlodipine to 5 mg a day. We will obtain any necessary labs when she comes in for her physical.  Follow-up and Dispositions    · Return for CPE, w/gyn, needs for insurance, office only, 45 min.              712  Subjective:   Tess Ivan is a 63 y.o. female who was seen for   Annual Wellness Visit      Left hand numbness/Raynaud's disease  Has a history of reynauds, started on amlodipine 2.5mg and the right hand is better, much better than left  Always has symptoms in the left and right hand together, never has isolated left hand symptoms  Symptoms are numbness and changes in skin color to light blue  Currently a smoker: smoking 1/2-1 pack a day, is cutting back    Prior to Admission medications Medication Sig Start Date End Date Taking? Authorizing Provider   amLODIPine (NORVASC) 5 mg tablet Take 1 Tab by mouth daily. 12/31/20  Yes Smita Salas NP   albuterol (PROVENTIL HFA, VENTOLIN HFA, PROAIR HFA) 90 mcg/actuation inhaler INHALE 1 PUFF BY MOUTH EVERY 4 TO 6 HOURS AS NEEDED FOR WHEEZING 11/29/20  Yes Smita Salas NP   hydroxychloroquine (PLAQUENIL) 200 mg tablet  2/7/20  Yes Provider, Historical   ibuprofen (MOTRIN) 600 mg tablet Take  by mouth every six (6) hours as needed for Pain. Yes Provider, Historical   esomeprazole (NEXIUM) 20 mg capsule Take  by mouth. Yes Provider, Historical   celecoxib (CELEBREX) 100 mg capsule TK 1 C PO BID 10/20/20   Provider, Historical   amLODIPine (NORVASC) 2.5 mg tablet Take 1 Tab by mouth daily. 5/27/20 12/31/20  Provider, Historical   methocarbamol (ROBAXIN) 500 mg tablet Take 1 Tab by mouth four (4) times daily. 2/12/20 12/31/20  Smita Salas NP     Allergies   Allergen Reactions    Keflex [Cephalexin] Rash       Patient Active Problem List   Diagnosis Code    Tobacco use Z72.0    Degenerative joint disease (DJD) of hip M16.9    History of DVT (deep vein thrombosis) Z86.718    Rheumatoid arthritis involving multiple sites ([facility] [address] M06.9    Raynaud's disease without gangrene I73.00    Essential hypertension I10    Anxiety F41.9     Past Surgical History:   Procedure Laterality Date    HX CERVICAL DISKECTOMY      HX CHOLECYSTECTOMY      HX HERNIA REPAIR  08/19/2019     Family History   Family history unknown: Yes     Social History     Tobacco Use    Smoking status: Current Every Day Smoker     Packs/day: 1.00     Years: 25.00     Pack years: 25.00    Smokeless tobacco: Never Used   Substance Use Topics    Alcohol use: No     Frequency: Never       ROS  As stated in HPI, otherwise all others negative. Objective: There were no vitals taken for this visit.    General: alert, cooperative, no distress   Mental  status: normal mood, behavior, speech, dress, motor activity, and thought processes, able to follow commands   HENT: NCAT   Neck: no visualized mass   Resp: no respiratory distress   Neuro: no gross deficits   Skin: no discoloration or lesions of concern on visible areas   Psychiatric: normal affect, consistent with stated mood, no evidence of hallucinations     Additional exam findings: We discussed the expected course, resolution and complications of the diagnosis(es) in detail. Medication risks, benefits, costs, interactions, and alternatives were discussed as indicated. I advised her to contact the office if her condition worsens, changes or fails to improve as anticipated. She expressed understanding with the diagnosis(es) and plan. Caitlni Rasheed is a 63 y.o. female who was evaluated by a video visit encounter for concerns as above. Patient identification was verified prior to start of the visit. A caregiver was present when appropriate. Due to this being a TeleHealth encounter (During [facility]- public health emergency), evaluation of the following organ systems was limited: Vitals/Constitutional/EENT/Resp/CV/GI//MS/Neuro/Skin/Heme-Lymph-Imm. Pursuant to the emergency declaration under the [facility] waiver authority and the [employer] Act, this Virtual  Visit was conducted, with patient's (and/or legal guardian's) consent, to reduce the patient's risk of exposure to COVID-19 and provide necessary medical care. Services were provided through a video synchronous discussion virtually to substitute for in-person clinic visit. Patient and provider were located at their individual homes. An After Visit Summary was printed and given to the patient. All diagnosis have been discussed with the patient and all of the patient's questions have been answered.      Follow-up and Dispositions    · Return for CPE, w/gyn, needs for insurance, office only, 45 min. Moni White, [facility]  [address] [place] [address] Rd.   [facility], [place] [address]

## 2021-01-04 DIAGNOSIS — J40 BRONCHITIS: ICD-10-CM

## 2021-01-05 RX ORDER — ALBUTEROL SULFATE 90 UG/1
2 AEROSOL, METERED RESPIRATORY (INHALATION)
Qty: 8.5 G | Refills: 2 | Status: SHIPPED | OUTPATIENT
Start: 2021-01-05 | End: 2021-08-27 | Stop reason: SDUPTHER

## 2021-02-01 ENCOUNTER — VIRTUAL VISIT (OUTPATIENT)
Dept: FAMILY MEDICINE CLINIC | Age: 61
End: 2021-02-01
Payer: MEDICAID

## 2021-02-01 DIAGNOSIS — M54.12 CERVICAL RADICULOPATHY: ICD-10-CM

## 2021-02-01 DIAGNOSIS — R60.0 LOWER EXTREMITY EDEMA: ICD-10-CM

## 2021-02-01 DIAGNOSIS — Z13.220 LIPID SCREENING: ICD-10-CM

## 2021-02-01 DIAGNOSIS — I73.00 RAYNAUD'S DISEASE WITHOUT GANGRENE: ICD-10-CM

## 2021-02-01 DIAGNOSIS — R73.01 ELEVATED FASTING GLUCOSE: Primary | ICD-10-CM

## 2021-02-01 DIAGNOSIS — R35.0 URINARY FREQUENCY: ICD-10-CM

## 2021-02-01 DIAGNOSIS — E55.9 VITAMIN D DEFICIENCY: ICD-10-CM

## 2021-02-01 DIAGNOSIS — Z78.0 ASYMPTOMATIC MENOPAUSAL STATE: ICD-10-CM

## 2021-02-01 DIAGNOSIS — Z12.31 SCREENING MAMMOGRAM, ENCOUNTER FOR: ICD-10-CM

## 2021-02-01 PROCEDURE — 99214 OFFICE O/P EST MOD 30 MIN: CPT | Performed by: NURSE PRACTITIONER

## 2021-02-01 NOTE — PROGRESS NOTES
1st attempt- no reply 1511    2nd attempt- no reply 1530    Chief Complaint   Patient presents with    Leg Swelling     Bilateral leg swelling- denies pain x 1 mo        1. Have you been to the ER, urgent care clinic since your last visit? Hospitalized since your last visit? NO    2. Have you seen or consulted any other health care providers outside of the [address] [place] [address] since your last visit? Include any pap smears or colon screening.  Kinza Barrazack, rheumatology, Neurosurgery

## 2021-02-01 NOTE — PROGRESS NOTES
Lyndsay               [address]               [phone]      Fay Ferrara is a 63 y.o. female who was seen by synchronous (real-time) audio-video technology on 2/1/2021. Consent: Fay Ferrara, who was seen by synchronous (real-time) audio-video technology, and/or her healthcare decision maker, is aware that this patient-initiated, Telehealth encounter on 2/1/2021 is a billable service, with coverage as determined by her insurance carrier. She is aware that she may receive a bill and has provided verbal consent to proceed: Yes. Assessment & Plan:   Diagnoses and all orders for this visit:    1. Elevated fasting glucose  -     HEMOGLOBIN A1C WITH EAG; Future  Endorses frequent urination of up to 8 times a day including 3-4 times in the evening, increased thirst with a dry mouth  Review of the records show she has had some elevated fasting glucose levels of 127 and 123  Have her come in for labs to check her for diabetes    2. Urinary frequency  -     HEMOGLOBIN A1C WITH EAG; Future    3. Lower extremity edema  Endorses lower extremity edema, bilateral and equal  The onset of this symptom coincided with her initiating amlodipine for her Raynaud's disease  I suspect amlodipine could be the cause of her lower extremity edema, advised her to discuss this with rheumatology  She is coming in for a blood pressure check I will evaluate her legs at that time    4. Cervical radiculopathy  This is currently managed by neuro interventional surgery    5. Raynaud's disease without gangrene  She started on amlodipine, she endorses good response of her Raynaud's syndrome however, she has started to have some bilateral lower extremity edema  Advised her to discuss use of amlodipine with her rheumatologist    6. Screening mammogram, encounter for  -     SONIA MAMMO BI SCREENING INCL CAD; Future  Health maintenance need addressed    7.  Asymptomatic menopausal state  -     DEXA BONE DENSITY STUDY AXIAL; Future  Health maintenance need addressed    8. Vitamin D deficiency  -     VITAMIN D, 25 HYDROXY; Future  Follow-up on her vitamin D level    9. Lipid screening  -     LIPID PANEL; Future  Health maintenance needs addressed    Follow-up and Dispositions    · Return for ASAP, Labs, nurse visit, b/p check, AND, 3 months, HTN, 15 min, office only. 712  Subjective:   Dre Robles is a 63 y.o. female who was seen for   Leg Swelling (Bilateral leg swelling- denies pain x 1 mo ) and Other (frequent urination x 2 mos)    bilat leg swelling  Onset a couple of months ago  Characteristics: can push her finger in and the dent stays, resolves with elevation and overnight   Denies sob, difficulty breathing, chest pain  Does not wear compressin stockings, can see demarcation when wearing regular socks  Has never had swelling like this before  Has f/u with rheumatology in about a month  PMH: started amlodipine for raynauds a couple of months ago, endorses good response with amlodipine    Increased nocturia  Onset: since at least thanksgivng  Urinating about 3 times a night  Urinating about 5-6 times a day  Denies intermittent vision changes  When she urinates endorses a full bladder, most of the time  Urine is a light yellow  Endorses increased thirst, dry mouth    Prior to Admission medications    Medication Sig Start Date End Date Taking? Authorizing Provider   albuterol (PROVENTIL HFA, VENTOLIN HFA, PROAIR HFA) 90 mcg/actuation inhaler Take 2 Puffs by inhalation every four (4) hours as needed for Wheezing. 1/5/21  Yes Kassandra Martell NP   celecoxib (CELEBREX) 100 mg capsule TK 1 C PO BID 10/20/20  Yes Provider, Historical   amLODIPine (NORVASC) 5 mg tablet Take 1 Tab by mouth daily. 12/31/20  Yes Kassandra Martell NP   hydroxychloroquine (PLAQUENIL) 200 mg tablet  2/7/20  Yes Provider, Historical   ibuprofen (MOTRIN) 600 mg tablet Take  by mouth every six (6) hours as needed for Pain. Yes Provider, Historical   esomeprazole (NEXIUM) 20 mg capsule Take  by mouth. Yes Provider, Historical     Allergies   Allergen Reactions    Keflex [Cephalexin] Rash       Patient Active Problem List   Diagnosis Code    Tobacco use Z72.0    Degenerative joint disease (DJD) of hip M16.9    History of DVT (deep vein thrombosis) Z86.718    Rheumatoid arthritis involving multiple sites ([facility] [address] M06.9    Raynaud's disease without gangrene I73.00    Essential hypertension I10    Anxiety F41.9    Cervical radiculopathy M54.12     Past Surgical History:   Procedure Laterality Date    HX CERVICAL DISKECTOMY      HX CHOLECYSTECTOMY      HX HERNIA REPAIR  08/19/2019     Family History   Family history unknown: Yes     Social History     Tobacco Use    Smoking status: Current Every Day Smoker     Packs/day: 1.00     Years: 25.00     Pack years: 25.00    Smokeless tobacco: Never Used   Substance Use Topics    Alcohol use: No     Frequency: Never       ROS  As stated in HPI, otherwise all others negative. Objective: There were no vitals taken for this visit. General: alert, cooperative, no distress   Mental  status: normal mood, behavior, speech, dress, motor activity, and thought processes, able to follow commands   HENT: NCAT   Neck: no visualized mass   Resp: no respiratory distress   Neuro: no gross deficits   Skin: no discoloration or lesions of concern on visible areas   Psychiatric: normal affect, consistent with stated mood, no evidence of hallucinations     Additional exam findings: We discussed the expected course, resolution and complications of the diagnosis(es) in detail. Medication risks, benefits, costs, interactions, and alternatives were discussed as indicated. I advised her to contact the office if her condition worsens, changes or fails to improve as anticipated. She expressed understanding with the diagnosis(es) and plan.      Elham Santa is a 63 y.o. female who was evaluated by a video visit encounter for concerns as above. Patient identification was verified prior to start of the visit. A caregiver was present when appropriate. Due to this being a TeleHealth encounter (During [facility]-19 public health emergency), evaluation of the following organ systems was limited: Vitals/Constitutional/EENT/Resp/CV/GI//MS/Neuro/Skin/Heme-Lymph-Imm. Pursuant to the emergency declaration under the [address] [place] [address] waiver authority and the [employer] Act, this Virtual  Visit was conducted, with patient's (and/or legal guardian's) consent, to reduce the patient's risk of exposure to COVID-19 and provide necessary medical care. Services were provided through a video synchronous discussion virtually to substitute for in-person clinic visit. Patient and provider were located at their individual homes. An After Visit Summary was printed and given to the patient. All diagnosis have been discussed with the patient and all of the patient's questions have been answered. Follow-up and Dispositions    · Return for ASAP, Labs, nurse visit, b/p check, AND, 3 months, HTN, 15 min, office only. Felicia Noriega, [facility] [address] [address]   [address]

## 2021-02-22 ENCOUNTER — CLINICAL SUPPORT (OUTPATIENT)
Dept: FAMILY MEDICINE CLINIC | Age: 61
End: 2021-02-22

## 2021-02-22 ENCOUNTER — APPOINTMENT (OUTPATIENT)
Dept: FAMILY MEDICINE CLINIC | Age: 61
End: 2021-02-22

## 2021-02-22 DIAGNOSIS — Z13.220 LIPID SCREENING: ICD-10-CM

## 2021-02-22 DIAGNOSIS — E55.9 VITAMIN D DEFICIENCY: ICD-10-CM

## 2021-02-22 DIAGNOSIS — R73.01 ELEVATED FASTING GLUCOSE: ICD-10-CM

## 2021-02-22 DIAGNOSIS — R35.0 URINARY FREQUENCY: ICD-10-CM

## 2021-02-22 NOTE — PROGRESS NOTES
Chief Complaint   Patient presents with    Blood Pressure Check     Pt has routine follow up 5/4/21      I went to get pt for blood pressure check- she had her labs drawn and left. I called and left vm for pt to please call our office to reschedule.

## 2021-02-23 LAB
25(OH)D3 SERPL-MCNC: 29.6 NG/ML (ref 32–100)
AVG GLU, 10930: 100 MG/DL (ref 91–123)
CHOLEST SERPL-MCNC: 134 MG/DL (ref 110–200)
HBA1C MFR BLD HPLC: 5.1 % (ref 4.8–5.6)
HDLC SERPL-MCNC: 3.1 MG/DL (ref 0–5)
HDLC SERPL-MCNC: 43 MG/DL
LDL/HDL RATIO,LDHD: 1.6
LDLC SERPL CALC-MCNC: 67 MG/DL (ref 50–99)
NON-HDL CHOLESTEROL, 011976: 91 MG/DL
TRIGL SERPL-MCNC: 122 MG/DL (ref 40–149)
VLDLC SERPL CALC-MCNC: 24 MG/DL (ref 8–30)

## 2021-03-10 ENCOUNTER — VIRTUAL VISIT (OUTPATIENT)
Dept: FAMILY MEDICINE CLINIC | Age: 61
End: 2021-03-10
Payer: MEDICAID

## 2021-03-10 DIAGNOSIS — J00 ACUTE NASOPHARYNGITIS: Primary | ICD-10-CM

## 2021-03-10 PROCEDURE — 99213 OFFICE O/P EST LOW 20 MIN: CPT | Performed by: NURSE PRACTITIONER

## 2021-03-10 RX ORDER — GABAPENTIN 300 MG/1
CAPSULE ORAL
COMMUNITY
Start: 2021-01-13 | End: 2021-08-27

## 2021-03-10 NOTE — PROGRESS NOTES
Chief Complaint   Patient presents with    Fatigue    Chills     x 2 days     Nasal Congestion    Sleep Problem       1. Have you been to the ER, urgent care clinic since your last visit? Hospitalized since your last visit? No    2. Have you seen or consulted any other health care providers outside of the [address] [place] [address] since your last visit? Include any pap smears or colon screening. Rheumatology    Chief Complaint   Patient presents with    Fatigue    Chills     x 2 days     Nasal Congestion    Sleep Problem       3 most recent PHQ Screens 3/10/2021   Little interest or pleasure in doing things Not at all   Feeling down, depressed, irritable, or hopeless Not at all   Total Score PHQ 2 0   Trouble falling or staying asleep, or sleeping too much -   Feeling tired or having little energy -   Poor appetite, weight loss, or overeating -   Feeling bad about yourself - or that you are a failure or have let yourself or your family down -   Trouble concentrating on things such as school, work, reading, or watching TV -   Moving or speaking so slowly that other people could have noticed; or the opposite being so fidgety that others notice -   Thoughts of being better off dead, or hurting yourself in some way -   PHQ 9 Score -   How difficult have these problems made it for you to do your work, take care of your home and get along with others -     Fall Risk Assessment, last 12 mths 3/10/2021   Able to walk? Yes   Fall in past 12 months? 0   Do you feel unsteady? 0   Are you worried about falling 0   Number of falls in past 12 months -               Learning Assessment 2/12/2020   PRIMARY LEARNER Patient   HIGHEST LEVEL OF EDUCATION - PRIMARY LEARNER  GRADUATED HIGH SCHOOL OR GED   BARRIERS PRIMARY LEARNER NONE   CO-LEARNER CAREGIVER No   PRIMARY LANGUAGE ENGLISH   LEARNER PREFERENCE PRIMARY LISTENING   ANSWERED BY Nathalie ARNOLD SELF     There were no vitals taken for this visit.

## 2021-03-10 NOTE — PROGRESS NOTES
Adelaida Victoria. [address]      Sharyn Jorgensen is a 63 y.o. female who was seen by synchronous (real-time) audio-video technology on 3/10/2021. Consent: Sharyn Jorgensen, who was seen by synchronous (real-time) audio-video technology, and/or her healthcare decision maker, is aware that this patient-initiated, Telehealth encounter on 3/10/2021 is a billable service, with coverage as determined by her insurance carrier. She is aware that she may receive a bill and has provided verbal consent to proceed: Yes. Assessment & Plan:   Diagnoses and all orders for this visit:    1. Acute nasopharyngitis    Quarantine for 14 days, wear mask, keep 6 feet, wash hands,   OTC coricidine cold and flu, advised her she could take the nighttime formula to help her with sleeping  Advise testing for COVID if she wants to, she does desire testing however does not have transportation to go [facility] [place] therefore I advised her to check with urgent care clinics near her house, she states she has a [facility] nearby I advised her to call them first prior to going into see with the processes for testing  She verbalized understanding of all of the above and all of her questions were answered    Follow-up and Dispositions    · Return if symptoms worsen or fail to improve. 712  Subjective:   Sharyn Jorgensen is a 63 y.o. female who was seen for   Fatigue, Chills (x 2 days ), Nasal Congestion, and Sleep Problem    Onset: 5 days ago  Characteristics: runny nose, sneezing, chills, nasal congestion, watery eyes, weak  Denies: cough, fever, ear pain  Treatment: Pedialyte, nothing else  Quarantine for 14 days, wear mask, keep 6 feet, wash hands,   OTC coricidine cold and flu  Advise testing if she wants to        Prior to Admission medications    Medication Sig Start Date End Date Taking?  Authorizing Provider   gabapentin (NEURONTIN) 300 mg capsule TAKE 1 CAPSULE BY MOUTH THREE TIMES DAILY AS NEEDED 1/13/21  Yes Provider, Historical   celecoxib (CELEBREX) 100 mg capsule TK 1 C PO BID 10/20/20  Yes Provider, Historical   amLODIPine (NORVASC) 5 mg tablet Take 1 Tab by mouth daily. 12/31/20  Yes China Claire NP   hydroxychloroquine (PLAQUENIL) 200 mg tablet  2/7/20  Yes Provider, Historical   esomeprazole (NEXIUM) 20 mg capsule Take  by mouth. Yes Provider, Historical   albuterol (PROVENTIL HFA, VENTOLIN HFA, PROAIR HFA) 90 mcg/actuation inhaler Take 2 Puffs by inhalation every four (4) hours as needed for Wheezing. 1/5/21   China Claire NP   ibuprofen (MOTRIN) 600 mg tablet Take  by mouth every six (6) hours as needed for Pain. Provider, Historical     Allergies   Allergen Reactions    Keflex [Cephalexin] Rash       Patient Active Problem List   Diagnosis Code    Tobacco use Z72.0    Degenerative joint disease (DJD) of hip M16.9    History of DVT (deep vein thrombosis) Z86.718    Rheumatoid arthritis involving multiple sites ([facility] [address] M06.9    Raynaud's disease without gangrene I73.00    Essential hypertension I10    Anxiety F41.9    Cervical radiculopathy M54.12     Past Surgical History:   Procedure Laterality Date    HX CERVICAL DISKECTOMY      HX CHOLECYSTECTOMY      HX HERNIA REPAIR  08/19/2019     Family History   Family history unknown: Yes     Social History     Tobacco Use    Smoking status: Current Every Day Smoker     Packs/day: 1.00     Years: 25.00     Pack years: 25.00    Smokeless tobacco: Never Used   Substance Use Topics    Alcohol use: No     Frequency: Never       ROS   As stated in HPI, otherwise all others negative. Objective: There were no vitals taken for this visit.    General: alert, cooperative, no distress   Mental  status: normal mood, behavior, speech, dress, motor activity, and thought processes, able to follow commands   HENT: NCAT   Neck: no visualized mass   Resp: no respiratory distress   Neuro: no gross deficits   Skin: no discoloration or lesions of concern on visible areas   Psychiatric: normal affect, consistent with stated mood, no evidence of hallucinations     Additional exam findings: We discussed the expected course, resolution and complications of the diagnosis(es) in detail. Medication risks, benefits, costs, interactions, and alternatives were discussed as indicated. I advised her to contact the office if her condition worsens, changes or fails to improve as anticipated. She expressed understanding with the diagnosis(es) and plan. Smooth Gonzales is a 63 y.o. female who was evaluated by a video visit encounter for concerns as above. Patient identification was verified prior to start of the visit. A caregiver was present when appropriate. Due to this being a TeleHealth encounter (During [facility]), evaluation of the following organ systems was limited: Vitals/Constitutional/EENT/Resp/CV/GI//MS/Neuro/Skin/Heme-Lymph-Imm. Pursuant to the emergency declaration under the [facility] waiver authority and the [employer] Act, this Virtual  Visit was conducted, with patient's (and/or legal guardian's) consent, to reduce the patient's risk of exposure to COVID-19 and provide necessary medical care. Services were provided through a video synchronous discussion virtually to substitute for in-person clinic visit. Patient and provider were located at their individual homes. An After Visit Summary was printed and given to the patient. All diagnosis have been discussed with the patient and all of the patient's questions have been answered. Follow-up and Dispositions    · Return if symptoms worsen or fail to improve. Rayshawn Downey, [address]   [address]

## 2021-04-12 ENCOUNTER — HOSPITAL ENCOUNTER (OUTPATIENT)
Dept: WOMENS IMAGING | Age: 61
Discharge: HOME | End: 2021-04-12
Attending: NURSE PRACTITIONER
Payer: MEDICAID

## 2021-04-12 ENCOUNTER — HOSPITAL ENCOUNTER (OUTPATIENT)
Dept: BONE DENSITY | Age: 61
Discharge: HOME | End: 2021-04-12
Attending: NURSE PRACTITIONER
Payer: MEDICAID

## 2021-04-12 DIAGNOSIS — Z12.31 ENCOUNTER FOR SCREENING MAMMOGRAM FOR BREAST CANCER: ICD-10-CM

## 2021-04-12 DIAGNOSIS — Z78.0 ASYMPTOMATIC MENOPAUSAL STATE: ICD-10-CM

## 2021-04-12 PROCEDURE — 77063 BREAST TOMOSYNTHESIS BI: CPT

## 2021-04-12 PROCEDURE — 77080 DXA BONE DENSITY AXIAL: CPT

## 2021-04-15 ENCOUNTER — VIRTUAL VISIT (OUTPATIENT)
Dept: FAMILY MEDICINE CLINIC | Age: 61
End: 2021-04-15

## 2021-04-15 NOTE — PROGRESS NOTES
1st attempt: called patient at 12:22, no answer, Left a voicemail stating I will give a call back at appt time at 12:30pm for VV visit.   Rayshawn Arcos

## 2021-05-17 PROBLEM — M18.12 ARTHRITIS OF CARPOMETACARPAL (CMC) JOINT OF LEFT THUMB: Status: ACTIVE | Noted: 2020-05-09

## 2021-05-17 PROBLEM — M06.9: Status: ACTIVE | Noted: 2020-05-09

## 2021-05-17 PROBLEM — M25.532 CHRONIC WRIST PAIN, LEFT: Status: ACTIVE | Noted: 2020-07-27

## 2021-05-17 PROBLEM — M25.832: Status: ACTIVE | Noted: 2020-05-09

## 2021-05-17 PROBLEM — G89.29 CHRONIC WRIST PAIN, LEFT: Status: ACTIVE | Noted: 2020-07-27

## 2021-05-17 PROBLEM — S63.072A: Status: ACTIVE | Noted: 2020-05-09

## 2021-05-17 PROBLEM — M47.22 CERVICAL SPONDYLOSIS WITH RADICULOPATHY: Status: ACTIVE | Noted: 2021-02-02

## 2021-05-18 ENCOUNTER — TELEPHONE (OUTPATIENT)
Dept: FAMILY MEDICINE CLINIC | Age: 61
End: 2021-05-18

## 2021-05-18 PROBLEM — M81.8 OTHER OSTEOPOROSIS WITHOUT CURRENT PATHOLOGICAL FRACTURE: Status: ACTIVE | Noted: 2021-05-18

## 2021-05-18 PROBLEM — M34.9 LIMITED SCLERODERMA (HCC): Status: ACTIVE | Noted: 2021-05-18

## 2021-05-18 NOTE — TELEPHONE ENCOUNTER
Spoke with Rodrigo Mchugh, NP with rheumatology  Informed her of echo results called to me and bone density results of osteoporosis, yoel stated they will take care of the osteoporosis, results faxed to her office.  She is also referring pt to pulmonary dr. Bucky Snider

## 2021-05-21 ENCOUNTER — VIRTUAL VISIT (OUTPATIENT)
Dept: FAMILY MEDICINE CLINIC | Age: 61
End: 2021-05-21

## 2021-05-21 DIAGNOSIS — I73.00 RAYNAUD'S DISEASE WITHOUT GANGRENE: ICD-10-CM

## 2021-05-21 RX ORDER — AMLODIPINE BESYLATE 5 MG/1
5 TABLET ORAL DAILY
Qty: 90 TABLET | Refills: 1 | Status: CANCELLED | OUTPATIENT
Start: 2021-05-21

## 2021-05-21 RX ORDER — ACETAMINOPHEN 500 MG
500 TABLET ORAL
COMMUNITY

## 2021-05-21 RX ORDER — AMLODIPINE BESYLATE 5 MG/1
5 TABLET ORAL DAILY
Qty: 90 TABLET | Refills: 0 | Status: SHIPPED | OUTPATIENT
Start: 2021-05-21 | End: 2021-07-09 | Stop reason: SDUPTHER

## 2021-06-02 ENCOUNTER — VIRTUAL VISIT (OUTPATIENT)
Dept: FAMILY MEDICINE CLINIC | Age: 61
End: 2021-06-02
Payer: MEDICAID

## 2021-06-02 DIAGNOSIS — R21 RASH: Primary | ICD-10-CM

## 2021-06-02 DIAGNOSIS — R21 RASH AND NONSPECIFIC SKIN ERUPTION: ICD-10-CM

## 2021-06-02 PROCEDURE — 99213 OFFICE O/P EST LOW 20 MIN: CPT | Performed by: NURSE PRACTITIONER

## 2021-06-02 RX ORDER — TRIAMCINOLONE ACETONIDE 0.25 MG/G
CREAM TOPICAL 2 TIMES DAILY
Qty: 15 G | Refills: 0 | Status: SHIPPED | OUTPATIENT
Start: 2021-06-02 | End: 2021-09-03

## 2021-06-02 NOTE — PROGRESS NOTES
Lyndsay               Lele Cunningham [address]               [phone]      Ronal Lundberg is a 63 y.o. female who was seen by synchronous (real-time) audio-video technology on 6/2/2021. Consent: Ronal Lundberg, who was seen by synchronous (real-time) audio-video technology, and/or her healthcare decision maker, is aware that this patient-initiated, Telehealth encounter on 6/2/2021 is a billable service, with coverage as determined by her insurance carrier. She is aware that she may receive a bill and has provided verbal consent to proceed: Yes. Assessment & Plan:   Diagnoses and all orders for this visit:    1. Rash    2. Rash and nonspecific skin eruption  -     triamcinolone acetonide (KENALOG) 0.025 % topical cream; Apply  to affected area two (2) times a day. use thin layer    Visit today to evaluate the rash which has developed on her arm, picture is in physical exam, the rash started after a trip to [place], the rash is most consistent with a contact dermatitis, will treat with topical steroid cream, advised patient if the rash does not clear up or worsens to contact me and we will bring her into the office for further evaluation, patient verbalized understanding  Follow-up and Dispositions    · Return in about 3 months (around 9/2/2021) for HTN, office only, 15 min.              712  Subjective:     Health Maintenance Due   Topic Date Due    Hepatitis C Screening  Never done    Pneumococcal 0-64 years (1 of 2 - PPSV23) Never done    DTaP/Tdap/Td series (1 - Tdap) Never done    PAP AKA CERVICAL CYTOLOGY  Never done    Shingrix Vaccine Age 50> (1 of 2) Never done             Ronal Lundberg is a 63 y.o. female who was seen for   Rash (forearm at elbow )    Rash  Onset: couple weeks ago  Location: right arm, posterior surface  Characteristics: red, raised, itches-some, no drainage, skin is not flaky, seems to be bigger than last year: had no treatment last year, the rash lasted about two months last year  Treatment: OTC cortisone cream-caused a burning  The rash is not where else on her body  Went to [place] two weeks ago, had not gone to [facility] last year  PMH: has had a rash in the same spot last year        Prior to Admission medications    Medication Sig Start Date End Date Taking? Authorizing Provider   triamcinolone acetonide (KENALOG) 0.025 % topical cream Apply  to affected area two (2) times a day. use thin layer 6/2/21  Yes Oneal Fitzgerald NP   acetaminophen (TYLENOL) 500 mg tablet Take 500 mg by mouth every six (6) hours as needed for Pain. Yes Provider, Historical   amLODIPine (NORVASC) 5 mg tablet Take 1 Tablet by mouth daily. 5/21/21  Yes Oneal Fitzgerald NP   gabapentin (NEURONTIN) 300 mg capsule TAKE 1 CAPSULE BY MOUTH THREE TIMES DAILY AS NEEDED 1/13/21  Yes Provider, Historical   celecoxib (CELEBREX) 100 mg capsule TK 1 C PO BID 10/20/20  Yes Provider, Historical   hydroxychloroquine (PLAQUENIL) 200 mg tablet Take 200 mg by mouth two (2) times a day. 2/7/20  Yes Provider, Historical   albuterol (PROVENTIL HFA, VENTOLIN HFA, PROAIR HFA) 90 mcg/actuation inhaler Take 2 Puffs by inhalation every four (4) hours as needed for Wheezing. Patient not taking: Reported on 5/21/2021 1/5/21   Oneal Fitzgerald NP   ibuprofen (MOTRIN) 600 mg tablet Take  by mouth every six (6) hours as needed for Pain. Patient not taking: Reported on 6/2/2021    Provider, Historical   esomeprazole (NEXIUM) 20 mg capsule Take  by mouth.   Patient not taking: Reported on 6/2/2021    Provider, Historical     Allergies   Allergen Reactions    Keflex [Cephalexin] Rash       Patient Active Problem List   Diagnosis Code    Tobacco use Z72.0    Degenerative joint disease (DJD) of hip M16.9    History of DVT (deep vein thrombosis) Z86.718    Rheumatoid arthritis involving multiple sites ([facility] [address] M06.9    Raynaud's disease without gangrene I73.00    Essential hypertension I10  Anxiety F41.9    Cervical radiculopathy M54.12    Arthritis of carpometacarpal (CMC) joint of left thumb M18.12    Cervical spondylosis with radiculopathy M47.22    Chronic wrist pain, left M25.532, G89.29    Rheumatoid arthritis involving left wrist (HCC) M06.9    Subluxation of distal end of left ulna S63.072A    Ulnocarpal abutment syndrome, 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 Objective: There were no vitals taken for this visit. General: alert, cooperative, no distress   Mental  status: normal mood, behavior, speech, dress, motor activity, and thought processes, able to follow commands   HENT: NCAT   Neck: no visualized mass   Resp: no respiratory distress   Neuro: no gross deficits   Skin: no discoloration or lesions of concern on visible areas   Psychiatric: normal affect, consistent with stated mood, no evidence of hallucinations     Additional exam findings: We discussed the expected course, resolution and complications of the diagnosis(es) in detail. Medication risks, benefits, costs, interactions, and alternatives were discussed as indicated. I advised her to contact the office if her condition worsens, changes or fails to improve as anticipated. She expressed understanding with the diagnosis(es) and plan. Linn Hinkle is a 63 y.o. female who was evaluated by a video visit encounter for concerns as above.  Patient identification was verified prior to start of the visit. A caregiver was present when appropriate. Due to this being a TeleHealth encounter (During [place]-23 public health emergency), evaluation of the following organ systems was limited: Vitals/Constitutional/EENT/Resp/CV/GI//MS/Neuro/Skin/Heme-Lymph-Imm. Pursuant to the emergency declaration under the [facility] [place] [address] waiver authority and the [employer] Act, this Virtual  Visit was conducted, with patient's (and/or legal guardian's) consent, to reduce the patient's risk of exposure to COVID-19 and provide necessary medical care. Services were provided through a video synchronous discussion virtually to substitute for in-person clinic visit. Patient and provider were located at their individual homes. An After Visit Summary was printed and given to the patient. All diagnosis have been discussed with the patient and all of the patient's questions have been answered. Follow-up and Dispositions    · Return in about 3 months (around 9/2/2021) for HTN, office only, 15 min. [place] [id number]   Lele Cunningham

## 2021-07-09 DIAGNOSIS — I73.00 RAYNAUD'S DISEASE WITHOUT GANGRENE: ICD-10-CM

## 2021-07-09 RX ORDER — AMLODIPINE BESYLATE 5 MG/1
5 TABLET ORAL DAILY
Qty: 90 TABLET | Refills: 0 | Status: SHIPPED | OUTPATIENT
Start: 2021-07-09 | End: 2021-09-23

## 2021-08-26 ENCOUNTER — TELEPHONE (OUTPATIENT)
Dept: FAMILY MEDICINE CLINIC | Age: 61
End: 2021-08-26

## 2021-08-26 DIAGNOSIS — J40 BRONCHITIS: ICD-10-CM

## 2021-08-26 DIAGNOSIS — I71.40 ABDOMINAL AORTIC ANEURYSM (AAA) WITHOUT RUPTURE: ICD-10-CM

## 2021-08-26 DIAGNOSIS — I10 ESSENTIAL HYPERTENSION: ICD-10-CM

## 2021-08-26 DIAGNOSIS — I71.20 THORACIC AORTIC ANEURYSM WITHOUT RUPTURE: ICD-10-CM

## 2021-08-26 DIAGNOSIS — J06.9 VIRAL UPPER RESPIRATORY TRACT INFECTION: Primary | ICD-10-CM

## 2021-08-26 NOTE — TELEPHONE ENCOUNTER
Patient states \" I have been sneezing and my nose has been  runny and it has now turned into a cough that is in my chest. It started saturday night and I have not been able to sleep for the past 2 days\". Patient will receive a call back with further instructions.

## 2021-08-27 RX ORDER — FLUTICASONE PROPIONATE 50 MCG
2 SPRAY, SUSPENSION (ML) NASAL DAILY
Qty: 1 BOTTLE | Refills: 0 | Status: SHIPPED | OUTPATIENT
Start: 2021-08-27 | End: 2021-09-23

## 2021-08-27 RX ORDER — ATENOLOL 50 MG/1
50 TABLET ORAL DAILY
Qty: 90 TABLET | Refills: 0 | Status: SHIPPED | OUTPATIENT
Start: 2021-08-27 | End: 2021-11-21

## 2021-08-27 RX ORDER — ALBUTEROL SULFATE 90 UG/1
2 AEROSOL, METERED RESPIRATORY (INHALATION)
Qty: 8.5 G | Refills: 2 | Status: SHIPPED | OUTPATIENT
Start: 2021-08-27 | End: 2022-04-11

## 2021-08-27 RX ORDER — ATORVASTATIN CALCIUM 10 MG/1
10 TABLET, FILM COATED ORAL DAILY
Qty: 90 TABLET | Refills: 0 | Status: SHIPPED | OUTPATIENT
Start: 2021-08-27 | End: 2021-09-03 | Stop reason: SDUPTHER

## 2021-08-27 RX ORDER — ATENOLOL 50 MG/1
50 TABLET ORAL DAILY
COMMUNITY
Start: 2021-06-09 | End: 2021-08-27 | Stop reason: SDUPTHER

## 2021-08-27 RX ORDER — AZELASTINE 1 MG/ML
1 SPRAY, METERED NASAL 2 TIMES DAILY
Qty: 1 BOTTLE | Refills: 0 | Status: SHIPPED | OUTPATIENT
Start: 2021-08-27 | End: 2022-04-11

## 2021-08-27 NOTE — TELEPHONE ENCOUNTER
Returned call. C/o sinus congestion,   Onset 7 days ago  cough worse when lying down  She did not get tested for COVID  She is quarantine for at least 10 days, she has been wearing a mask when ever she leaves the house, she did get the vaccines  She has been in contact with a niece who had the same symptoms, the niece was tested negative for COVID  She has been cutting back on her cigarette smoking. States she has a moist cough, too thick to get it up.   She has been using the albuterol inhaler, tylenol    advised  Use flonase and astelin  Take OTC coricidan  Warm compress to sinuses  Stay well hydrated  vicks vapor rub in bowel of hot water and inhale mist    If symptoms are not resolved by 9/3 or develop a fever, productive cough of green or grey sputum, will give ABX    Pt verbalized understanding and is in agreement with the plan of care

## 2021-09-03 ENCOUNTER — VIRTUAL VISIT (OUTPATIENT)
Dept: FAMILY MEDICINE CLINIC | Age: 61
End: 2021-09-03
Payer: MEDICAID

## 2021-09-03 DIAGNOSIS — I71.20 THORACIC AORTIC ANEURYSM WITHOUT RUPTURE: ICD-10-CM

## 2021-09-03 DIAGNOSIS — I10 ESSENTIAL HYPERTENSION: Primary | ICD-10-CM

## 2021-09-03 DIAGNOSIS — I71.40 ABDOMINAL AORTIC ANEURYSM (AAA) WITHOUT RUPTURE: ICD-10-CM

## 2021-09-03 PROCEDURE — 99213 OFFICE O/P EST LOW 20 MIN: CPT | Performed by: NURSE PRACTITIONER

## 2021-09-03 RX ORDER — ATORVASTATIN CALCIUM 10 MG/1
10 TABLET, FILM COATED ORAL DAILY
Qty: 90 TABLET | Refills: 0 | Status: SHIPPED | OUTPATIENT
Start: 2021-09-03 | End: 2021-12-01

## 2021-09-03 NOTE — PROGRESS NOTES
Please use this number [phone]    Did you take your medication today? Yes      1. Have you been to the ER, urgent care clinic since your last visit? Hospitalized since your last visit? No    2. Have you seen or consulted any other health care providers outside of the [address] [place] [address] since your last visit? Include any pap smears or colon screening.  Yes [facility]     Health Maintenance Due   Topic Date Due    Hepatitis C Screening  Never done    Pneumococcal 0-64 years (1 of 2 - PPSV23) Never done    DTaP/Tdap/Td series (1 - Tdap) Never done    PAP AKA CERVICAL CYTOLOGY  Never done    Shingrix Vaccine Age 50> (1 of 2) Never done    Low dose CT lung screening  Never done    Flu Vaccine (1) Never done

## 2021-09-03 NOTE — PROGRESS NOTES
[address]      Laura Wilson is a 57 y.o. female who was seen by synchronous (real-time) audio-video technology on 9/3/2021. Consent: Laura Wilson, who was seen by synchronous (real-time) audio-video technology, and/or her healthcare decision maker, is aware that this patient-initiated, Telehealth encounter on 9/3/2021 is a billable service, with coverage as determined by her insurance carrier. She is aware that she may receive a bill and has provided verbal consent to proceed: Yes. Assessment & Plan:   Diagnoses and all orders for this visit:    1. Essential hypertension  -     METABOLIC PANEL, COMPREHENSIVE; Future  Endorses medication compliance, Have asked him/her to come in for follow-up labs and Have asked him/her to come in for vital sign check   2. Thoracic aortic aneurysm without rupture (HCC)  -     atorvastatin (LIPITOR) 10 mg tablet; Take 1 Tablet by mouth daily.  -     LIPID PANEL; Future  Endorses medication compliance, Refill provided, Have asked him/her to come in for follow-up labs and Denies abdominal pain or symptoms of myalgia   3. Abdominal aortic aneurysm (AAA) without rupture (HCC)  -     atorvastatin (LIPITOR) 10 mg tablet; Take 1 Tablet by mouth daily. Follow-up and Dispositions    · Return in about 4 weeks (around 10/1/2021) for Lab, b/pcheck, NV, AND, 4month , HTN, HLD, 30 min, office only.              712  Subjective:     Health Maintenance Due   Topic Date Due    Hepatitis C Screening  Never done    Pneumococcal 0-64 years (1 of 2 - PPSV23) Never done    DTaP/Tdap/Td series (1 - Tdap) Never done    PAP AKA CERVICAL CYTOLOGY  Never done    Shingrix Vaccine Age 50> (1 of 2) Never done    Low dose CT lung screening  Never done    Flu Vaccine (1) Never done             Laura Wilson is a 57 y.o. female who was seen for   Follow Up Chronic Condition (3 months ), Hypertension, Nasal Congestion, and Sneezing    Hypertension:   Patient reports taking medications as instructed. yes   Medication side effects noted. no  Headache upon wakening. no   Home BP monitoring in range of does not check   Do you experience chest pain/pressure or SOB with exertion? no  Maintain a low Sodium diet? yes  Key CAD CHF Meds             atorvastatin (LIPITOR) 10 mg tablet (Taking) Take 1 Tablet by mouth daily. atenoloL (TENORMIN) 50 mg tablet (Taking) Take 1 Tablet by mouth daily. amLODIPine (NORVASC) 5 mg tablet (Taking) Take 1 Tablet by mouth daily. BUN   Date Value Ref Range Status   10/15/2017 14 7 - 25 mg/dl Final     Creatinine   Date Value Ref Range Status   10/15/2017 0.6 0.6 - 1.3 mg/dl Final     GFR est AA   Date Value Ref Range Status   10/15/2017 >60.0   Final     Comment:     THE [facility] STATES THAT THE MDRD STUDY EQUATION SHOULD ONLY BE USED ON  INDIVIDUALS 18 OR OLDER. THE REPORT ALSO NOTES THAT THE MDRD STUDY EQUATION HAS NOT BEEN  VALIDATED FOR USE WITH THE ELDERLY (OVER 79YEARS OF AGE), PREGNANT WOMEN, PATIENTS WITH SERIOUS  COMORBID CONDITIONS, OR PERSONS WITH EXTREMES OF BODY SIZE, MUSCLE MASS, OR NUTRITIONAL STATUS. APPLICATION OF THE EQUATION TO THESE PATIENT GROUPS MAY LEAD TO ERRORS IN GFR ESTIMATION. GFR  ESTIMATING EQUATIONS HAVE POORER AGREEMENT WITH MEASURED GFR FOR ILL HOSPITALIZED PATIENTS AND  FOR PEOPLE WITH NEAR NORMAL KIDNEY FUNCTION THAN FOR SUBJECTS IN THE MDRD STUDY. VALIDATION  STUDIES ARE IN PROGRESS TO EVALUATE THE MDRD STUDY EQUATION FOR ADDITIONAL ETHNIC GROUPS, THE  ELDERLY, VARIOUS DISEASE CONDITIONS, AND PEOPLE WITH NORMAL KIDNEY FUNCTION. GFRA----REFERS TO   GFRO---REFERS TO OTHER RACES  REFERENCES AVAILABLE UPON REQUEST. Potassium   Date Value Ref Range Status   10/15/2017 3.5 3.5 - 4.9 mEq/L Final           Prior to Admission medications    Medication Sig Start Date End Date Taking?  Authorizing Provider atorvastatin (LIPITOR) 10 mg tablet Take 1 Tablet by mouth daily. 9/3/21  Yes Alan Laureano NP   fluticasone propionate (FLONASE) 50 mcg/actuation nasal spray 2 Sprays by Both Nostrils route daily. 8/27/21  Yes Alan Laureano NP   azelastine (ASTELIN) 137 mcg (0.1 %) nasal spray 1 [place] by Both Nostrils route two (2) times a day. Use in each nostril as directed 8/27/21  Yes Alan Laureano NP   albuterol (PROVENTIL HFA, VENTOLIN HFA, PROAIR HFA) 90 mcg/actuation inhaler Take 2 Puffs by inhalation every four (4) hours as needed for Wheezing. 8/27/21  Yes Alan Laureano NP   atenoloL (TENORMIN) 50 mg tablet Take 1 Tablet by mouth daily. 8/27/21  Yes Alan Laureano NP   amLODIPine (NORVASC) 5 mg tablet Take 1 Tablet by mouth daily. 7/9/21  Yes Alan Laureano NP   acetaminophen (TYLENOL) 500 mg tablet Take 500 mg by mouth every six (6) hours as needed for Pain. Yes Provider, Historical   celecoxib (CELEBREX) 100 mg capsule TK 1 C PO BID 10/20/20  Yes Provider, Historical   hydroxychloroquine (PLAQUENIL) 200 mg tablet Take 200 mg by mouth two (2) times a day. 2/7/20  Yes Provider, Historical   esomeprazole (NEXIUM) 20 mg capsule Take  by mouth. Yes Provider, Historical   atorvastatin (LIPITOR) 10 mg tablet Take 1 Tablet by mouth daily. 8/27/21 9/3/21  Alan Laureano NP   triamcinolone acetonide (KENALOG) 0.025 % topical cream Apply  to affected area two (2) times a day. use thin layer  Patient not taking: Reported on 9/3/2021 6/2/21 9/3/21  Alan Laureano NP   ibuprofen (MOTRIN) 600 mg tablet Take  by mouth every six (6) hours as needed for Pain.   Patient not taking: Reported on 6/2/2021  9/3/21  Provider, Historical     Allergies   Allergen Reactions    Keflex [Cephalexin] Rash       Patient Active Problem List   Diagnosis Code    Tobacco use Z72.0    Degenerative joint disease (DJD) of hip M16.9    History of DVT (deep vein thrombosis) Z86.718    Rheumatoid arthritis involving multiple sites ([facility] [address] M06.9    Raynaud's disease without gangrene I73.00    Essential hypertension I10    Anxiety F41.9    Cervical radiculopathy M54.12    Arthritis of carpometacarpal (CMC) joint of left thumb M18.12    Cervical spondylosis with radiculopathy M47.22    Chronic wrist pain, left M25.532, G89.29    Rheumatoid arthritis involving left wrist (HCC) M06.9    Subluxation of distal end of left ulna S63.072A    Ulnocarpal abutment syndrome, 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    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    Other osteoporosis without current pathological fracture M81.8    Thoracic aortic aneurysm without rupture ([facility]) I71.2     Past Surgical History:   Procedure Laterality Date    HX CERVICAL DISKECTOMY      HX CHOLECYSTECTOMY      HX HERNIA REPAIR  08/19/2019     Family History   Family history unknown: Yes     Social History     Tobacco Use    Smoking status: Current Every Day Smoker     Packs/day: 1.00     Years: 25.00     Pack years: 25.00    Smokeless tobacco: Never Used   Substance Use Topics    Alcohol use: No       ROS  As stated in HPI, otherwise all others negative. Objective: There were no vitals taken for this visit. General: alert, cooperative, no distress   Mental  status: normal mood, behavior, speech, dress, motor activity, and thought processes, able to follow commands   HENT: NCAT   Neck: no visualized mass   Resp: no respiratory distress   Neuro: no gross deficits   Skin: no discoloration or lesions of concern on visible areas   Psychiatric: normal affect, consistent with stated mood, no evidence of hallucinations     Additional exam findings: We discussed the expected course, resolution and complications of the diagnosis(es) in detail. Medication risks, benefits, costs, interactions, and alternatives were discussed as indicated. I advised her to contact the office if her condition worsens, changes or fails to improve as anticipated.  She expressed understanding with the diagnosis(es) and plan. Todd Kuo is a 57 y.o. female who was evaluated by a video visit encounter for concerns as above. Patient identification was verified prior to start of the visit. A caregiver was present when appropriate. Due to this being a TeleHealth encounter (During IUL-57 public health emergency), evaluation of the following organ systems was limited: Vitals/Constitutional/EENT/Resp/CV/GI//MS/Neuro/Skin/Heme-Lymph-Imm. Pursuant to the emergency declaration under the [address] [place] [address], [facility] waiver authority and the [employer] Act, this Virtual  Visit was conducted, with patient's (and/or legal guardian's) consent, to reduce the patient's risk of exposure to COVID-19 and provide necessary medical care. Services were provided through a video synchronous discussion virtually to substitute for in-person clinic visit. Patient and provider were located at their individual homes. An After Visit Summary was printed and given to the patient. All diagnosis have been discussed with the patient and all of the patient's questions have been answered. Follow-up and Dispositions    · Return in about 4 weeks (around 10/1/2021) for Lab, b/pcheck, NV, AND, 4month , HTN, HLD, 30 min, office only. Nohemi Coleman, [facility] [id number]   Lele Cunningham

## 2021-09-22 DIAGNOSIS — J06.9 VIRAL UPPER RESPIRATORY TRACT INFECTION: ICD-10-CM

## 2021-09-22 DIAGNOSIS — I73.00 RAYNAUD'S DISEASE WITHOUT GANGRENE: ICD-10-CM

## 2021-09-23 RX ORDER — AMLODIPINE BESYLATE 5 MG/1
5 TABLET ORAL DAILY
Qty: 90 TABLET | Refills: 0 | Status: SHIPPED | OUTPATIENT
Start: 2021-09-23 | End: 2021-12-21

## 2021-09-23 RX ORDER — FLUTICASONE PROPIONATE 50 MCG
SPRAY, SUSPENSION (ML) NASAL
Qty: 48 G | Refills: 5 | Status: SHIPPED | OUTPATIENT
Start: 2021-09-23 | End: 2022-04-11

## 2021-10-01 ENCOUNTER — CLINICAL SUPPORT (OUTPATIENT)
Dept: FAMILY MEDICINE CLINIC | Age: 61
End: 2021-10-01

## 2021-10-01 VITALS
TEMPERATURE: 98.6 F | OXYGEN SATURATION: 97 % | WEIGHT: 145.6 LBS | HEIGHT: 67 IN | DIASTOLIC BLOOD PRESSURE: 60 MMHG | HEART RATE: 60 BPM | SYSTOLIC BLOOD PRESSURE: 139 MMHG | BODY MASS INDEX: 22.85 KG/M2

## 2021-10-01 DIAGNOSIS — I10 ESSENTIAL HYPERTENSION: Primary | ICD-10-CM

## 2021-10-01 NOTE — PROGRESS NOTES
Patient presents today for nurse visit BP check. BP was taken manually feet flat on the floor. Patient is aware if NP Ezequiel Chamberlain needs to make any changes we will call her back but keep follow up appointment schedule in 4months. Visit Vitals  /60   Pulse 60   Temp 98.6 °F (37 °C) (Temporal)   Ht 5' 7\" (1.702 m)   Wt 145 lb 9.6 oz (66 kg)   SpO2 97%   BMI 22.80 kg/m²       Patient declined flu vaccine today.

## 2021-10-02 LAB
A-G RATIO,AGRAT: 1.8 RATIO (ref 1.1–2.6)
ALBUMIN SERPL-MCNC: 4.1 G/DL (ref 3.5–5)
ALP SERPL-CCNC: 63 U/L (ref 40–120)
ALT SERPL-CCNC: 25 U/L (ref 5–40)
ANION GAP SERPL CALC-SCNC: 8 MMOL/L (ref 3–15)
AST SERPL W P-5'-P-CCNC: 23 U/L (ref 10–37)
BILIRUB SERPL-MCNC: 0.4 MG/DL (ref 0.2–1.2)
BUN SERPL-MCNC: 18 MG/DL (ref 6–22)
CALCIUM SERPL-MCNC: 9.3 MG/DL (ref 8.4–10.5)
CHLORIDE SERPL-SCNC: 105 MMOL/L (ref 98–110)
CHOLEST SERPL-MCNC: 115 MG/DL (ref 110–200)
CO2 SERPL-SCNC: 28 MMOL/L (ref 20–32)
CREAT SERPL-MCNC: 0.7 MG/DL (ref 0.8–1.4)
GFRAA, 66117: >60
GFRNA, 66118: >60
GLOBULIN,GLOB: 2.3 G/DL (ref 2–4)
GLUCOSE SERPL-MCNC: 121 MG/DL (ref 70–99)
HDLC SERPL-MCNC: 2.7 MG/DL (ref 0–5)
HDLC SERPL-MCNC: 42 MG/DL
LDL/HDL RATIO,LDHD: 1.1
LDLC SERPL CALC-MCNC: 46 MG/DL (ref 50–99)
NON-HDL CHOLESTEROL, 011976: 73 MG/DL
POTASSIUM SERPL-SCNC: 5 MMOL/L (ref 3.5–5.5)
PROT SERPL-MCNC: 6.4 G/DL (ref 6.2–8.1)
SODIUM SERPL-SCNC: 141 MMOL/L (ref 133–145)
TRIGL SERPL-MCNC: 137 MG/DL (ref 40–149)
VLDLC SERPL CALC-MCNC: 27 MG/DL (ref 8–30)

## 2021-11-21 DIAGNOSIS — I10 ESSENTIAL HYPERTENSION: ICD-10-CM

## 2021-11-21 DIAGNOSIS — I71.20 THORACIC AORTIC ANEURYSM WITHOUT RUPTURE: ICD-10-CM

## 2021-11-21 DIAGNOSIS — I71.40 ABDOMINAL AORTIC ANEURYSM (AAA) WITHOUT RUPTURE: ICD-10-CM

## 2021-11-21 RX ORDER — ATENOLOL 50 MG/1
50 TABLET ORAL DAILY
Qty: 90 TABLET | Refills: 0 | Status: SHIPPED | OUTPATIENT
Start: 2021-11-21 | End: 2022-02-21

## 2021-12-01 DIAGNOSIS — I71.20 THORACIC AORTIC ANEURYSM WITHOUT RUPTURE: ICD-10-CM

## 2021-12-01 DIAGNOSIS — I71.40 ABDOMINAL AORTIC ANEURYSM (AAA) WITHOUT RUPTURE: ICD-10-CM

## 2021-12-01 RX ORDER — ATORVASTATIN CALCIUM 10 MG/1
10 TABLET, FILM COATED ORAL DAILY
Qty: 90 TABLET | Refills: 0 | Status: SHIPPED | OUTPATIENT
Start: 2021-12-01 | End: 2022-05-13

## 2021-12-21 DIAGNOSIS — I73.00 RAYNAUD'S DISEASE WITHOUT GANGRENE: ICD-10-CM

## 2021-12-21 RX ORDER — AMLODIPINE BESYLATE 5 MG/1
5 TABLET ORAL DAILY
Qty: 90 TABLET | Refills: 0 | Status: SHIPPED | OUTPATIENT
Start: 2021-12-21 | End: 2022-02-16

## 2022-01-12 ENCOUNTER — VIRTUAL VISIT (OUTPATIENT)
Dept: FAMILY MEDICINE CLINIC | Age: 62
End: 2022-01-12

## 2022-01-12 NOTE — PROGRESS NOTES
Please use this number [phone]    Patient states I have not been sleeping well for about 5 to 6 months. Did you take your medication today? Yes      1. Have you been to the ER, urgent care clinic since your last visit? Hospitalized since your last visit? No    2. Have you seen or consulted any other health care providers outside of the [address] [place] [address] since your last visit? Include any pap smears or colon screening.  Yes Patient states i have been to the arthritis    3 most recent PHQ Screens 10/1/2021   Little interest or pleasure in doing things Not at all   Feeling down, depressed, irritable, or hopeless Not at all   Total Score PHQ 2 0   Trouble falling or staying asleep, or sleeping too much -   Feeling tired or having little energy -   Poor appetite, weight loss, or overeating -   Feeling bad about yourself - or that you are a failure or have let yourself or your family down -   Trouble concentrating on things such as school, work, reading, or watching TV -   Moving or speaking so slowly that other people could have noticed; or the opposite being so fidgety that others notice -   Thoughts of being better off dead, or hurting yourself in some way -   PHQ 9 Score -   How difficult have these problems made it for you to do your work, take care of your home and get along with others -         Health Maintenance Due   Topic Date Due    Hepatitis C Screening  Never done    DTaP/Tdap/Td series (1 - Tdap) Never done    Cervical cancer screen  Never done    Shingrix Vaccine Age 50> (1 of 2) Never done    Low dose CT lung screening  Never done    Flu Vaccine (1) Never done    COVID-19 Vaccine (3 - Booster for Moderna series) 10/18/2021       Learning Assessment 2/12/2020   PRIMARY LEARNER Patient   HIGHEST LEVEL OF EDUCATION - PRIMARY LEARNER  GRADUATED HIGH SCHOOL OR GED   BARRIERS PRIMARY LEARNER NONE   CO-LEARNER CAREGIVER No   PRIMARY LANGUAGE ENGLISH   LEARNER PREFERENCE PRIMARY LISTENING ANSWERED BY Jo Ann Penny   RELATIONSHIP SELF

## 2022-02-14 ENCOUNTER — VIRTUAL VISIT (OUTPATIENT)
Dept: FAMILY MEDICINE CLINIC | Age: 62
End: 2022-02-14

## 2022-02-14 NOTE — PROGRESS NOTES
Sent text at 1500 and again [phone] at [phone], states she is trying to sign in  Called 1514, she needs in office visit, transferred to front to make appointment.

## 2022-02-14 NOTE — PROGRESS NOTES
Please use this number [phone]    Did you take your medication today? Yes       1. \"Have you been to the ER, urgent care clinic since your last visit? Hospitalized since your last visit? \" No    2. \"Have you seen or consulted any other health care providers outside of the [address] [place] [address] since your last visit? \" No     3. For patients aged 39-70: Has the patient had a colonoscopy / FIT/ Cologuard? Yes - no Care Gap present      If the patient is female:    4. For patients aged 41-77: Has the patient had a mammogram within the past 2 years? Yes - no Care Gap present      5. For patients aged 21-65: Has the patient had a pap smear? Yes - Care Gap present.  Rooming MA/LPN to request most recent results         3 most recent PHQ Screens 2/14/2022   Little interest or pleasure in doing things Not at all   Feeling down, depressed, irritable, or hopeless Not at all   Total Score PHQ 2 0   Trouble falling or staying asleep, or sleeping too much -   Feeling tired or having little energy -   Poor appetite, weight loss, or overeating -   Feeling bad about yourself - or that you are a failure or have let yourself or your family down -   Trouble concentrating on things such as school, work, reading, or watching TV -   Moving or speaking so slowly that other people could have noticed; or the opposite being so fidgety that others notice -   Thoughts of being better off dead, or hurting yourself in some way -   PHQ 9 Score -   How difficult have these problems made it for you to do your work, take care of your home and get along with others -         Health Maintenance Due   Topic Date Due    Hepatitis C Screening  Never done    DTaP/Tdap/Td series (1 - Tdap) Never done    Cervical cancer screen  Never done    Shingrix Vaccine Age 50> (1 of 2) Never done    Low dose CT lung screening  Never done    Flu Vaccine (1) Never done       Learning Assessment 2/12/2020   PRIMARY LEARNER Patient   HIGHEST LEVEL OF EDUCATION - PRIMARY LEARNER  GRADUATED HIGH SCHOOL OR GED   BARRIERS PRIMARY LEARNER NONE   CO-LEARNER CAREGIVER No   PRIMARY LANGUAGE ENGLISH   LEARNER PREFERENCE PRIMARY LISTENING   ANSWERED BY Elizabeth ARNOLD SELF

## 2022-02-16 ENCOUNTER — OFFICE VISIT (OUTPATIENT)
Dept: FAMILY MEDICINE CLINIC | Age: 62
End: 2022-02-16
Payer: MEDICAID

## 2022-02-16 VITALS
OXYGEN SATURATION: 97 % | WEIGHT: 157 LBS | SYSTOLIC BLOOD PRESSURE: 129 MMHG | HEIGHT: 67 IN | BODY MASS INDEX: 24.64 KG/M2 | DIASTOLIC BLOOD PRESSURE: 56 MMHG | TEMPERATURE: 98.2 F | HEART RATE: 69 BPM | RESPIRATION RATE: 18 BRPM

## 2022-02-16 DIAGNOSIS — I10 ESSENTIAL HYPERTENSION: Primary | ICD-10-CM

## 2022-02-16 DIAGNOSIS — Z79.899 ON STATIN THERAPY: ICD-10-CM

## 2022-02-16 DIAGNOSIS — I73.00 RAYNAUD'S DISEASE WITHOUT GANGRENE: ICD-10-CM

## 2022-02-16 DIAGNOSIS — Z11.59 NEED FOR HEPATITIS C SCREENING TEST: ICD-10-CM

## 2022-02-16 PROBLEM — M06.9: Status: RESOLVED | Noted: 2020-05-09 | Resolved: 2022-02-16

## 2022-02-16 PROCEDURE — 99214 OFFICE O/P EST MOD 30 MIN: CPT | Performed by: NURSE PRACTITIONER

## 2022-02-16 RX ORDER — AMLODIPINE BESYLATE 5 MG/1
7.5 TABLET ORAL DAILY
Qty: 45 TABLET | Refills: 0
Start: 2022-02-16

## 2022-02-16 NOTE — PROGRESS NOTES
Room 7     Did patient bring someone? No    Did the patient have DME equipment? No     Did you take your medication today? Yes       1. \"Have you been to the ER, urgent care clinic since your last visit? Hospitalized since your last visit? \" No    2. \"Have you seen or consulted any other health care providers outside of the [address] [place] [address] since your last visit? \" No     3. For patients aged 39-70: Has the patient had a colonoscopy / FIT/ Cologuard? Yes-No Care Gap Present       If the patient is female:    4. For patients aged 41-77: Has the patient had a mammogram within the past 2 years? Yes - no Care Gap present      5. For patients aged 21-65: Has the patient had a pap smear?  No        3 most recent PHQ Screens 2/14/2022   Little interest or pleasure in doing things Not at all   Feeling down, depressed, irritable, or hopeless Not at all   Total Score PHQ 2 0   Trouble falling or staying asleep, or sleeping too much -   Feeling tired or having little energy -   Poor appetite, weight loss, or overeating -   Feeling bad about yourself - or that you are a failure or have let yourself or your family down -   Trouble concentrating on things such as school, work, reading, or watching TV -   Moving or speaking so slowly that other people could have noticed; or the opposite being so fidgety that others notice -   Thoughts of being better off dead, or hurting yourself in some way -   PHQ 9 Score -   How difficult have these problems made it for you to do your work, take care of your home and get along with others -         Health Maintenance Due   Topic Date Due    Hepatitis C Screening  Never done    DTaP/Tdap/Td series (1 - Tdap) Never done    Cervical cancer screen  Never done    Shingrix Vaccine Age 50> (1 of 2) Never done    Low dose CT lung screening  Never done    Flu Vaccine (1) Never done       Learning Assessment 2/12/2020   PRIMARY LEARNER Patient   HIGHEST LEVEL OF EDUCATION - PRIMARY LEARNER GRADUATED HIGH SCHOOL OR GED   BARRIERS PRIMARY LEARNER NONE   CO-LEARNER CAREGIVER No   PRIMARY LANGUAGE ENGLISH   LEARNER PREFERENCE PRIMARY LISTENING   ANSWERED BY Ami Hendersno   RELATIONSHIP SELF

## 2022-02-16 NOTE — PROGRESS NOTES
Adelaida Victoria. [facility]      Keyana Parker is a 57 y.o. female and presents with Follow-up       Assessment/Plan:    Diagnoses and all orders for this visit:    1. Essential hypertension  -     METABOLIC PANEL, COMPREHENSIVE; Future  Endorses medication compliance  Blood pressure stable  Follow up labs prior to next visit  2. Raynaud's disease without gangrene  -     amLODIPine (NORVASC) 5 mg tablet; Take 1.5 Tablets by mouth daily. Refill provided  3. Need for hepatitis C screening test  -     HEPATITIS C AB; Future  Health maintenance need addressed  4. On statin therapy  -     LIPID PANEL; Future  Follow up labs prior to next visit      Follow-up and Dispositions    · Return in about 4 months (around 6/16/2022) for HTN, HLD, 15 min, office only, with, labs prior. Health Maintenance:   Health Maintenance   Topic Date Due    Hepatitis C Screening  Never done    DTaP/Tdap/Td series (1 - Tdap) Never done    Cervical cancer screen  Never done    Shingrix Vaccine Age 50> (1 of 2) Never done    Low dose CT lung screening  Never done    Flu Vaccine (1) Never done    Colorectal Cancer Screening Combo  07/15/2022    Lipid Screen  10/01/2022    Depression Screen  02/14/2023    Breast Cancer Screen Mammogram  04/12/2023    Bone Densitometry (Dexa) Screening  Completed    COVID-19 Vaccine  Completed    Pneumococcal 0-64 years  Aged Out        Subjective:    Labs obtained prior to visit? NO  Reviewed with patient? Not applicable    Hypertension:  Visit Vitals  BP (!) 129/56 (BP 1 Location: Right arm, BP Patient Position: Sitting, BP Cuff Size: Small adult) Comment (BP Patient Position): feet flat on floor   Pulse 69   Temp 98.2 °F (36.8 °C) (Temporal)   Resp 18   Ht 5' 7\" (1.702 m)   Wt 157 lb (71.2 kg)   SpO2 97%   BMI 24.59 kg/m²      Patient reports taking medications as instructed. yes   Medication side effects noted. no  Headache upon wakening. no   Home BP monitoring: Does not check  Do you experience chest pain/pressure or SOB with exertion? no  Maintain a low Sodium diet? yes  Key CAD CHF Meds             amLODIPine (NORVASC) 5 mg tablet (Taking) Take 1.5 Tablets by mouth daily. atorvastatin (LIPITOR) 10 mg tablet (Taking) TAKE 1 TABLET BY MOUTH DAILY    atenoloL (TENORMIN) 50 mg tablet (Taking/Discontinued) TAKE 1 TABLET BY MOUTH DAILY        BUN   Date Value Ref Range Status   10/01/2021 18 6 - 22 mg/dL Final     Creatinine   Date Value Ref Range Status   10/01/2021 0.7 (L) 0.8 - 1.4 mg/dL Final     GFR est AA   Date Value Ref Range Status   10/15/2017 >60.0   Final     Comment:     THE [facility] STATES THAT THE MDRD STUDY EQUATION SHOULD ONLY BE USED ON  INDIVIDUALS 18 OR OLDER. THE REPORT ALSO NOTES THAT THE MDRD STUDY EQUATION HAS NOT BEEN  VALIDATED FOR USE WITH THE ELDERLY (OVER 79YEARS OF AGE), PREGNANT WOMEN, PATIENTS WITH SERIOUS  COMORBID CONDITIONS, OR PERSONS WITH EXTREMES OF BODY SIZE, MUSCLE MASS, OR NUTRITIONAL STATUS. APPLICATION OF THE EQUATION TO THESE PATIENT GROUPS MAY LEAD TO ERRORS IN GFR ESTIMATION. GFR  ESTIMATING EQUATIONS HAVE POORER AGREEMENT WITH MEASURED GFR FOR ILL HOSPITALIZED PATIENTS AND  FOR PEOPLE WITH NEAR NORMAL KIDNEY FUNCTION THAN FOR SUBJECTS IN THE MDRD STUDY. VALIDATION  STUDIES ARE IN PROGRESS TO EVALUATE THE MDRD STUDY EQUATION FOR ADDITIONAL ETHNIC GROUPS, THE  ELDERLY, VARIOUS DISEASE CONDITIONS, AND PEOPLE WITH NORMAL KIDNEY FUNCTION. GFRA----REFERS TO   GFRO---REFERS TO OTHER RACES  REFERENCES AVAILABLE UPON REQUEST. Potassium   Date Value Ref Range Status   10/01/2021 5.0 3.5 - 5.5 mmol/L Final     HLD:  Has been compliant with meds  Yes  Compliant with low-fat diet. most of the time    Denies myalgias or other side effects. yes  The ASCVD Risk score (Poppy Watson., et al., 2013) failed to calculate for the following reasons:     The valid total cholesterol range is 130 to 320 mg/dL    Cholesterol, total   Date Value Ref Range Status   10/01/2021 115 110 - 200 mg/dL Final     Triglyceride   Date Value Ref Range Status   10/01/2021 137 40 - 149 mg/dL Final     HDL Cholesterol   Date Value Ref Range Status   10/01/2021 42 >=40 mg/dL Final     LDL, calculated   Date Value Ref Range Status   10/01/2021 46 (L) 50 - 99 mg/dL Final   ]  Key Antihyperlipidemia Meds             atorvastatin (LIPITOR) 10 mg tablet (Taking) TAKE 1 TABLET BY MOUTH DAILY             ROS:     ROS  As stated in HPI, otherwise all others negative. The problem list was updated as a part of today's visit. Patient Active Problem List   Diagnosis Code    Tobacco use Z72.0    Degenerative joint disease (DJD) of hip M16.9    History of DVT (deep vein thrombosis) Z86.718    Rheumatoid arthritis involving multiple sites ([facility] [address] M06.9    Raynaud's disease without gangrene I73.00    Essential hypertension I10    Anxiety F41.9    Cervical radiculopathy M54.12    Arthritis of carpometacarpal (CMC) joint of left thumb M18.12    Cervical spondylosis with radiculopathy M47.22    Chronic wrist pain, left M25.532, G89.29    Subluxation of distal end of left ulna S63.072A    Ulnocarpal abutment syndrome, 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       SH:  Social History     Tobacco Use    Smoking status: Former Smoker     Packs/day: 1.00     Years: 25.00     Pack years: 25.00     Quit date: 2021     Years since quittin.4    Smokeless tobacco: Never Used   Vaping Use    Vaping Use: Never used   Substance Use Topics    Alcohol use: No    Drug use: No       Medications/Allergies:  Current Outpatient Medications on File Prior to Visit   Medication Sig Dispense Refill    atorvastatin (LIPITOR) 10 mg tablet TAKE 1 TABLET BY MOUTH DAILY 90 Tablet 0    fluticasone propionate (FLONASE) 50 mcg/actuation nasal spray SHAKE LIQUID AND USE 2 SPRAYS IN EACH NOSTRIL DAILY 48 g 5    albuterol (PROVENTIL HFA, VENTOLIN HFA, PROAIR HFA) 90 mcg/actuation inhaler Take 2 Puffs by inhalation every four (4) hours as needed for Wheezing. 8.5 g 2    acetaminophen (TYLENOL) 500 mg tablet Take 500 mg by mouth every six (6) hours as needed for Pain.  celecoxib (CELEBREX) 100 mg capsule TK 1 C PO BID      hydroxychloroquine (PLAQUENIL) 200 mg tablet Take 200 mg by mouth two (2) times a day.  esomeprazole (NEXIUM) 20 mg capsule Take  by mouth.  azelastine (ASTELIN) 137 mcg (0.1 %) nasal spray 1 [place] by Both Nostrils route two (2) times a day. Use in each nostril as directed (Patient not taking: Reported on 2/14/2022) 1 Bottle 0     No current facility-administered medications on file prior to visit. Allergies   Allergen Reactions    Keflex [Cephalexin] Rash       Objective:  Visit Vitals  BP (!) 129/56 (BP 1 Location: Right arm, BP Patient Position: Sitting, BP Cuff Size: Small adult) Comment (BP Patient Position): feet flat on floor   Pulse 69   Temp 98.2 °F (36.8 °C) (Temporal)   Resp 18   Ht 5' 7\" (1.702 m)   Wt 157 lb (71.2 kg)   SpO2 97%   BMI 24.59 kg/m²    Body mass index is 24.59 kg/m². Physical assessment  Physical Exam  Vitals and nursing note reviewed. Eyes:      Conjunctiva/sclera: Conjunctivae normal.      Pupils: Pupils are equal, round, and reactive to light. Cardiovascular:      Rate and Rhythm: Normal rate and regular rhythm. Heart sounds: Normal heart sounds. No murmur heard. No friction rub. No gallop. Pulmonary:      Effort: Pulmonary effort is normal.      Breath sounds: Normal breath sounds. Musculoskeletal:         General: Normal range of motion. Cervical back: Normal range of motion. Skin:     General: Skin is warm and dry. Neurological:      Mental Status: She is alert. Labwork and Ancillary Studies:    CBC w/Diff  Lab Results   Component Value Date/Time    WBC 5.2 02/28/2019 01:53 PM    HGB 12.6 02/28/2019 01:53 PM    PLATELET 760 23/50/0622 01:53 PM         Basic Metabolic Profile  Lab Results   Component Value Date/Time    Sodium 141 10/01/2021 10:13 AM    Potassium 5.0 10/01/2021 10:13 AM    Chloride 105 10/01/2021 10:13 AM    CO2 28 10/01/2021 10:13 AM    Anion gap 8.0 10/01/2021 10:13 AM    Glucose 121 (H) 10/01/2021 10:13 AM    BUN 18 10/01/2021 10:13 AM    Creatinine 0.7 (L) 10/01/2021 10:13 AM    GFR est AA >60.0 10/15/2017 02:40 AM    GFR est non-AA >60 10/15/2017 02:40 AM    Calcium 9.3 10/01/2021 10:13 AM        Cholesterol  Lab Results   Component Value Date/Time    Cholesterol, total 115 10/01/2021 10:13 AM    HDL Cholesterol 42 10/01/2021 10:13 AM    LDL, calculated 46 (L) 10/01/2021 10:13 AM    Triglyceride 137 10/01/2021 10:13 AM           I have discussed the diagnosis with the patient and the intended plan as seen in the above orders. The patient has received an After-Visit Summary and questions were answered concerning future plans. An After Visit Summary was printed and given to the patient. All diagnosis have been discussed with the patient and all of the patient's questions have been answered. Follow-up and Dispositions    · Return in about 4 months (around 6/16/2022) for HTN, HLD, 15 min, office only, with, labs prior. Paulette Pereira, [facility]  [address] [facility]   [address] [facility] [address]  [id number]

## 2022-02-19 DIAGNOSIS — I10 ESSENTIAL HYPERTENSION: ICD-10-CM

## 2022-02-19 DIAGNOSIS — I71.40 ABDOMINAL AORTIC ANEURYSM (AAA) WITHOUT RUPTURE: ICD-10-CM

## 2022-02-19 DIAGNOSIS — I71.20 THORACIC AORTIC ANEURYSM WITHOUT RUPTURE: ICD-10-CM

## 2022-02-21 RX ORDER — ATENOLOL 50 MG/1
50 TABLET ORAL DAILY
Qty: 90 TABLET | Refills: 0 | Status: SHIPPED | OUTPATIENT
Start: 2022-02-21 | End: 2022-05-09

## 2022-03-02 ENCOUNTER — TELEPHONE (OUTPATIENT)
Dept: FAMILY MEDICINE CLINIC | Age: 62
End: 2022-03-02

## 2022-03-02 NOTE — TELEPHONE ENCOUNTER
Patient called with complaints of back pain. Advised patient there is only 1 provider in the office today and she will need to be evaluated at urgent care. Patient verbalized understanding.

## 2022-03-18 PROBLEM — M16.9 DEGENERATIVE JOINT DISEASE (DJD) OF HIP: Status: ACTIVE | Noted: 2019-05-07

## 2022-03-18 PROBLEM — M81.8 OTHER OSTEOPOROSIS WITHOUT CURRENT PATHOLOGICAL FRACTURE: Status: ACTIVE | Noted: 2021-05-18

## 2022-03-18 PROBLEM — M25.832: Status: ACTIVE | Noted: 2020-05-09

## 2022-03-18 PROBLEM — I71.20 THORACIC AORTIC ANEURYSM WITHOUT RUPTURE (HCC): Status: ACTIVE | Noted: 2021-09-03

## 2022-03-19 PROBLEM — I73.00 RAYNAUD'S DISEASE WITHOUT GANGRENE: Status: ACTIVE | Noted: 2020-02-12

## 2022-03-19 PROBLEM — M06.9 RHEUMATOID ARTHRITIS INVOLVING MULTIPLE SITES (HCC): Status: ACTIVE | Noted: 2020-02-12

## 2022-03-19 PROBLEM — Z86.718 HISTORY OF DVT (DEEP VEIN THROMBOSIS): Status: ACTIVE | Noted: 2019-05-09

## 2022-03-19 PROBLEM — M18.12 ARTHRITIS OF CARPOMETACARPAL (CMC) JOINT OF LEFT THUMB: Status: ACTIVE | Noted: 2020-05-09

## 2022-03-19 PROBLEM — M54.12 CERVICAL RADICULOPATHY: Status: ACTIVE | Noted: 2021-02-01

## 2022-03-19 PROBLEM — F41.9 ANXIETY: Status: ACTIVE | Noted: 2020-03-13

## 2022-03-19 PROBLEM — M47.22 CERVICAL SPONDYLOSIS WITH RADICULOPATHY: Status: ACTIVE | Noted: 2021-02-02

## 2022-03-19 PROBLEM — S63.072A: Status: ACTIVE | Noted: 2020-05-09

## 2022-03-19 PROBLEM — Z72.0 TOBACCO USE: Status: ACTIVE | Noted: 2019-02-13

## 2022-03-19 PROBLEM — M34.9 LIMITED SCLERODERMA (HCC): Status: ACTIVE | Noted: 2021-05-18

## 2022-03-19 PROBLEM — G89.29 CHRONIC WRIST PAIN, LEFT: Status: ACTIVE | Noted: 2020-07-27

## 2022-03-19 PROBLEM — I10 ESSENTIAL HYPERTENSION: Status: ACTIVE | Noted: 2020-02-12

## 2022-03-19 PROBLEM — M25.532 CHRONIC WRIST PAIN, LEFT: Status: ACTIVE | Noted: 2020-07-27

## 2022-04-11 ENCOUNTER — OFFICE VISIT (OUTPATIENT)
Dept: FAMILY MEDICINE CLINIC | Age: 62
End: 2022-04-11
Payer: MEDICAID

## 2022-04-11 VITALS
RESPIRATION RATE: 20 BRPM | HEART RATE: 57 BPM | TEMPERATURE: 98.2 F | SYSTOLIC BLOOD PRESSURE: 134 MMHG | DIASTOLIC BLOOD PRESSURE: 64 MMHG | WEIGHT: 157 LBS | BODY MASS INDEX: 24.64 KG/M2 | OXYGEN SATURATION: 97 % | HEIGHT: 67 IN

## 2022-04-11 DIAGNOSIS — Z01.818 PREOPERATIVE GENERAL PHYSICAL EXAMINATION: Primary | ICD-10-CM

## 2022-04-11 DIAGNOSIS — Z87.891 HISTORY OF TOBACCO ABUSE: ICD-10-CM

## 2022-04-11 PROCEDURE — 99214 OFFICE O/P EST MOD 30 MIN: CPT | Performed by: NURSE PRACTITIONER

## 2022-04-11 NOTE — PROGRESS NOTES
[address]               [phone]    Preoperative Evaluation    Date of Exam: 4/11/2022    Kendy Weiner is a 57 y.o. female (URM:[birth year]33[birth year]83) who presents for preoperative evaluation. Procedure/Surgery:Cervical decompression and fusion  Date of Procedure/Surgery: 4/14/2022  Surgeon: Giacmoo Monaco MD  Hospital/Surgical Facility: [address] [place] [address]  Primary Physician: Ian Sullivan NP  Latex Allergy: no    Problem List:     Patient Active Problem List    Diagnosis Date Noted    Essential hypertension 02/12/2020    Thoracic aortic aneurysm without rupture ([facility] [address] 09/03/2021    Rheumatoid arthritis involving multiple sites ([facility] [address] 02/12/2020    Limited scleroderma ([facility] [address] 05/18/2021    Other osteoporosis without current pathological fracture 05/18/2021    Cervical spondylosis with radiculopathy 02/02/2021    Cervical radiculopathy 02/01/2021    Chronic wrist pain, left 07/27/2020    Arthritis of carpometacarpal [place]) joint of left thumb 05/09/2020    Subluxation of distal end of left ulna 05/09/2020    Ulnocarpal abutment syndrome, left 05/09/2020    Anxiety 03/13/2020    Raynaud's disease without gangrene 02/12/2020    History of DVT (deep vein thrombosis) 05/09/2019    Degenerative joint disease (DJD) of hip 05/07/2019    Tobacco use 02/13/2019     Medical History:     Past Medical History:   Diagnosis Date    Back pain     Hypertension     Menopause      Allergies: Allergies   Allergen Reactions    Keflex [Cephalexin] Rash      Medications:     Current Outpatient Medications   Medication Sig    atenoloL (TENORMIN) 50 mg tablet TAKE 1 TABLET BY MOUTH DAILY    amLODIPine (NORVASC) 5 mg tablet Take 1.5 Tablets by mouth daily.  atorvastatin (LIPITOR) 10 mg tablet TAKE 1 TABLET BY MOUTH DAILY    acetaminophen (TYLENOL) 500 mg tablet Take 500 mg by mouth every six (6) hours as needed for Pain.     celecoxib (CELEBREX) 100 mg capsule TK 1 C PO BID    hydroxychloroquine (PLAQUENIL) 200 mg tablet Take 200 mg by mouth two (2) times a day.  esomeprazole (NEXIUM) 20 mg capsule Take  by mouth. No current facility-administered medications for this visit. Surgical History:     Past Surgical History:   Procedure Laterality Date    HX CERVICAL DISKECTOMY      HX CHOLECYSTECTOMY      HX HERNIA REPAIR  2019     Social History:     Social History     Socioeconomic History    Marital status:    Tobacco Use    Smoking status: Former Smoker     Packs/day: 1.00     Years: 25.00     Pack years: 25.00     Quit date: 2021     Years since quittin.5    Smokeless tobacco: Never Used   Vaping Use    Vaping Use: Never used   Substance and Sexual Activity    Alcohol use: No    Drug use: No    Sexual activity: Yes     Partners: Male     Birth control/protection: None   Social History Narrative    ** Merged History Encounter **            Recent use of: No recent use of aspirin (ASA), NSAIDS or steroids    Tetanus up to date: tetanus status unknown to the patient      Anesthesia Complications: None  History of abnormal bleeding : None  History of Blood Transfusions: when she was 20years old  Health Care Directive or Living Will: no    REVIEW OF SYSTEMS:  Review of Systems   Constitutional: Negative. Respiratory: Negative. Cardiovascular: Negative. Neurological: Negative. EXAM:   Visit Vitals  /64 (BP 1 Location: Right arm, BP Patient Position: Sitting, BP Cuff Size: Small adult) Comment (BP Patient Position): feet flt on floor   Pulse (!) 57   Temp 98.2 °F (36.8 °C) (Temporal)   Resp 20   Ht 5' 7\" (1.702 m)   Wt 157 lb (71.2 kg)   SpO2 97%   BMI 24.59 kg/m²     Physical Exam  Vitals and nursing note reviewed. Eyes:      Conjunctiva/sclera: Conjunctivae normal.      Pupils: Pupils are equal, round, and reactive to light. Cardiovascular:      Rate and Rhythm: Normal rate and regular rhythm. Heart sounds: Normal heart sounds. No murmur heard. No friction rub. No gallop. Pulmonary:      Effort: Pulmonary effort is normal.      Breath sounds: Normal breath sounds. Musculoskeletal:         General: Normal range of motion. Cervical back: Normal range of motion. Skin:     General: Skin is warm and dry. Neurological:      Mental Status: She is alert. DIAGNOSTICS:   1. EKG: EKG FINDINGS - per machine read: sinus santa  2. CXR: was negative for infiltrate, effusion, pneumothorax, or wide mediastinum  3. METS: >4  4. Labs: 3/30/2022  WBC x 10*3 4.0 - 11.0 K/uL 5.5    RBC x 10^6 3. 80 - 5.20 M/uL 3.73 Low     HGB 11.7 - 16.0 g/dL 12.3    HCT 35.1 - 48.0 % 37.3    MCV 80 - 99 fL 100 High     MCH 26 - 34 pg 33    MCHC 31 - 36 g/dL 33    RDW 10.0 - 15.5 % 12.4    Platelet 085 - 990 K/uL 157    MPV 9.0 - 13.0 fL 10.5       Ref Range & Units 12 d ago   Potassium 3.5 - 5.5 mmol/L 4.3    Sodium 133 - 145 mmol/L 136    Chloride 98 - 110 mmol/L 101    Glucose 70 - 99 mg/dL 88    Calcium 8.4 - 10.5 mg/dL 9.6    BUN 6 - 22 mg/dL 13    Creatinine 0.8 - 1.4 mg/dL 0.8    CO2 20 - 32 mmol/L 25    eGFR African American >60.0 >60.0    eGFR Non African American >60.0 >60.0    Anion Gap 3.0 - 15.0 mmol/L 10.0            IMPRESSION:   Pre op risk  Hypertension: controlled, take Atenolol day of surgery  History of DVT in 2006, unprovoked, took Coumadin for a year, at that time was smoking and drinking  Has ascending aortic aneurysm-Vascular following, next follow up CT due in May  No contraindications to planned surgery  Routine DVT prophylaxis     Diagnoses and all orders for this visit:    1. Preoperative general physical examination  -     XR CHEST PA LAT; Future    2. History of tobacco abuse  -     XR CHEST PA LAT; Future      Follow-up and Dispositions    · Return for keep previously scheduled follow up.          Keturah Sanchez NP   4/11/2022    Tamara Blum MD  Internal medicine  4/12/2022

## 2022-04-11 NOTE — PROGRESS NOTES
Room 8    Did patient bring someone? No    Did the patient have DME equipment? No     Did you take your medication today? Yes       1. \"Have you been to the ER, urgent care clinic since your last visit? Hospitalized since your last visit? \" No    2. \"Have you seen or consulted any other health care providers outside of the [address] [place] [address] since your last visit? \" No     3. For patients aged 39-70: Has the patient had a colonoscopy / FIT/ Cologuard? Yes-no Care Gap present       If the patient is female:    4. For patients aged 41-77: Has the patient had a mammogram within the past 2 years? Yes - no Care Gap present      5. For patients aged 21-65: Has the patient had a pap smear?  No Patient states I need an appointment for pap         3 most recent PHQ Screens 4/11/2022   Little interest or pleasure in doing things Not at all   Feeling down, depressed, irritable, or hopeless Not at all   Total Score PHQ 2 0   Trouble falling or staying asleep, or sleeping too much -   Feeling tired or having little energy -   Poor appetite, weight loss, or overeating -   Feeling bad about yourself - or that you are a failure or have let yourself or your family down -   Trouble concentrating on things such as school, work, reading, or watching TV -   Moving or speaking so slowly that other people could have noticed; or the opposite being so fidgety that others notice -   Thoughts of being better off dead, or hurting yourself in some way -   PHQ 9 Score -   How difficult have these problems made it for you to do your work, take care of your home and get along with others -         Health Maintenance Due   Topic Date Due    Hepatitis C Screening  Never done    DTaP/Tdap/Td series (1 - Tdap) Never done    Cervical cancer screen  Never done    Shingrix Vaccine Age 50> (1 of 2) Never done    Low dose CT lung screening  Never done       Learning Assessment 2/12/2020   PRIMARY LEARNER Patient   HIGHEST LEVEL OF EDUCATION - PRIMARY LEARNER  GRADUATED HIGH SCHOOL OR GED   BARRIERS PRIMARY LEARNER NONE   CO-LEARNER CAREGIVER No   PRIMARY LANGUAGE ENGLISH   LEARNER PREFERENCE PRIMARY LISTENING   ANSWERED BY Bay Crenshaw   RELATIONSHIP SELF

## 2022-05-09 DIAGNOSIS — I10 ESSENTIAL HYPERTENSION: ICD-10-CM

## 2022-05-09 DIAGNOSIS — I71.20 THORACIC AORTIC ANEURYSM WITHOUT RUPTURE: ICD-10-CM

## 2022-05-09 DIAGNOSIS — I71.40 ABDOMINAL AORTIC ANEURYSM (AAA) WITHOUT RUPTURE: ICD-10-CM

## 2022-05-09 RX ORDER — ATENOLOL 50 MG/1
50 TABLET ORAL DAILY
Qty: 90 TABLET | Refills: 0 | Status: SHIPPED | OUTPATIENT
Start: 2022-05-09 | End: 2022-08-01

## 2022-06-14 ENCOUNTER — HOSPITAL ENCOUNTER (OUTPATIENT)
Dept: WOMENS IMAGING | Age: 62
Discharge: HOME | End: 2022-06-14
Attending: NURSE PRACTITIONER

## 2022-06-14 DIAGNOSIS — Z12.31 ENCOUNTER FOR SCREENING MAMMOGRAM FOR BREAST CANCER: ICD-10-CM

## 2022-06-16 ENCOUNTER — OFFICE VISIT (OUTPATIENT)
Dept: FAMILY MEDICINE CLINIC | Age: 62
End: 2022-06-16
Payer: MEDICAID

## 2022-06-16 VITALS
WEIGHT: 126.6 LBS | DIASTOLIC BLOOD PRESSURE: 55 MMHG | RESPIRATION RATE: 18 BRPM | BODY MASS INDEX: 19.87 KG/M2 | OXYGEN SATURATION: 97 % | HEIGHT: 67 IN | SYSTOLIC BLOOD PRESSURE: 127 MMHG | HEART RATE: 60 BPM | TEMPERATURE: 98.3 F

## 2022-06-16 DIAGNOSIS — I10 ESSENTIAL HYPERTENSION: Primary | ICD-10-CM

## 2022-06-16 DIAGNOSIS — I73.00 RAYNAUD'S DISEASE WITHOUT GANGRENE: ICD-10-CM

## 2022-06-16 PROCEDURE — 99214 OFFICE O/P EST MOD 30 MIN: CPT | Performed by: NURSE PRACTITIONER

## 2022-06-16 RX ORDER — GABAPENTIN 300 MG/1
300 CAPSULE ORAL 3 TIMES DAILY
COMMUNITY
Start: 2022-05-20 | End: 2022-10-03

## 2022-06-16 RX ORDER — METHOCARBAMOL 500 MG/1
1000 TABLET, FILM COATED ORAL 4 TIMES DAILY
COMMUNITY
Start: 2022-06-01 | End: 2022-10-03

## 2022-06-16 RX ORDER — AMLODIPINE BESYLATE 2.5 MG/1
TABLET ORAL
COMMUNITY
Start: 2022-04-02

## 2022-06-16 NOTE — PROGRESS NOTES
Room 9    When asked if patient has any concerns he would like to address with AMRITA Feliciano patient states  no . Did patient bring someone? No    Did the patient have DME equipment? Yes Neck Brace     Did you take your medication today? Yes       1. \"Have you been to the ER, urgent care clinic since your last visit? Hospitalized since your last visit? \" No    2. \"Have you seen or consulted any other health care providers outside of the [address] [place] [address] since your last visit? \" No     3. For patients aged 39-70: Has the patient had a colonoscopy / FIT/ Cologuard? Yes - no Care Gap present      If the patient is female:    4. For patients aged 41-77: Has the patient had a mammogram within the past 2 years? Yes - no Care Gap present      5. For patients aged 21-65: Has the patient had a pap smear?  No Patient states I will schedule an appointment with AMRITA Riley for pap.         3 most recent PHQ Screens 6/16/2022   Little interest or pleasure in doing things Several days   Feeling down, depressed, irritable, or hopeless Several days   Total Score PHQ 2 2   Trouble falling or staying asleep, or sleeping too much -   Feeling tired or having little energy -   Poor appetite, weight loss, or overeating -   Feeling bad about yourself - or that you are a failure or have let yourself or your family down -   Trouble concentrating on things such as school, work, reading, or watching TV -   Moving or speaking so slowly that other people could have noticed; or the opposite being so fidgety that others notice -   Thoughts of being better off dead, or hurting yourself in some way -   PHQ 9 Score -   How difficult have these problems made it for you to do your work, take care of your home and get along with others -         Health Maintenance Due   Topic Date Due    DTaP/Tdap/Td series (1 - Tdap) Never done    Cervical cancer screen  Never done    Shingrix Vaccine Age 50> (1 of 2) Never done    Low dose CT lung screening  Never done    Colorectal Cancer Screening Combo  07/15/2022       Learning Assessment 2/12/2020   PRIMARY LEARNER Patient   HIGHEST LEVEL OF EDUCATION - PRIMARY LEARNER  GRADUATED HIGH SCHOOL OR GED   BARRIERS PRIMARY LEARNER NONE   CO-LEARNER CAREGIVER No   PRIMARY LANGUAGE ENGLISH   LEARNER PREFERENCE PRIMARY LISTENING   ANSWERED BY Gabi Zambrano   RELATIONSHIP SELF

## 2022-08-01 ENCOUNTER — HOSPITAL ENCOUNTER (OUTPATIENT)
Dept: WOMENS IMAGING | Age: 62
Discharge: HOME | End: 2022-08-01
Attending: NURSE PRACTITIONER
Payer: MEDICAID

## 2022-08-01 PROCEDURE — 77063 BREAST TOMOSYNTHESIS BI: CPT

## 2022-08-29 ENCOUNTER — TELEPHONE (OUTPATIENT)
Dept: FAMILY MEDICINE CLINIC | Age: 62
End: 2022-08-29

## 2022-08-29 DIAGNOSIS — R21 RASH: Primary | ICD-10-CM

## 2022-08-29 RX ORDER — HYDROCORTISONE 1 %
CREAM (GRAM) TOPICAL 2 TIMES DAILY
Qty: 30 G | Refills: 0 | Status: SHIPPED | OUTPATIENT
Start: 2022-08-29 | End: 2022-10-03

## 2022-08-29 RX ORDER — PREDNISONE 5 MG/1
5 TABLET ORAL SEE ADMIN INSTRUCTIONS
Qty: 21 TABLET | Refills: 0 | Status: SHIPPED | OUTPATIENT
Start: 2022-08-29 | End: 2022-10-03

## 2022-08-29 NOTE — TELEPHONE ENCOUNTER
Patient calling to report a rash on back of her hands, wrist and top of leg -from the knee  high way to her groin area. She started itching yesterday and noticed \"little red dots\"  She as been using Cortizone cream with no relief. She stated they look like mosquito bites. Patient reports no other symptoms. She also advised that she had a recent trip to [place] and returned on Monday 8/22 and noticed the itching on Friday 8/26. She would like something for the itching as soon as possible.       Call back [phone]

## 2022-08-29 NOTE — TELEPHONE ENCOUNTER
Called patient and rash at knees/ back and arms. Rash started on Friday 8/26- pt states has been using cortisone cream. Pt states was on the [employer] last week. Denies difficulty breathing, chest pain or shortness of breath. Denies hive like rash. Pt states feels like mosquito bites. Advised patient that unable to prescribe Atarax due to drug interaction with Plaquenil. Pt voiced understanding. Advised pt to  Benadryl allergy to take every 6-8 hours for itching--pt advised can cause sleepiness and to avoid driving or alcohol while on medication. Prednisone steroid pack and cream sent to pharmacy. Advised pt if any worsening of symptoms to go to [facility]. Pt voiced understanding.

## 2022-10-03 ENCOUNTER — VIRTUAL VISIT (OUTPATIENT)
Dept: FAMILY MEDICINE CLINIC | Age: 62
End: 2022-10-03
Payer: MEDICAID

## 2022-10-03 DIAGNOSIS — J06.9 VIRAL UPPER RESPIRATORY TRACT INFECTION: Primary | ICD-10-CM

## 2022-10-03 PROCEDURE — 99213 OFFICE O/P EST LOW 20 MIN: CPT | Performed by: NURSE PRACTITIONER

## 2022-10-03 NOTE — PROGRESS NOTES
When asked if patient has any concerns he would like to address with AMRITA Feliciano patient states yes, no .  Please use this number [phone]    Did patient bring someone? No    Did you take your medication today? Yes       1. \"Have you been to the ER, urgent care clinic since your last visit? Hospitalized since your last visit? \" No    2. \"Have you seen or consulted any other health care providers outside of the [address] [place] [address] since your last visit? \" No     3. For patients aged 39-70: Has the patient had a colonoscopy / FIT/ Cologuard? No Patient states I had a Cologuard done a couple months ago. If the patient is female:    4. For patients aged 41-77: Has the patient had a mammogram within the past 2 years? Yes - no Care Gap present      5. For patients aged 21-65: Has the patient had a pap smear?  No Patients states I will schedule a pap with AMRITA Weaver .         3 most recent PHQ Screens 10/3/2022   Little interest or pleasure in doing things Not at all   Feeling down, depressed, irritable, or hopeless Not at all   Total Score PHQ 2 0   Trouble falling or staying asleep, or sleeping too much -   Feeling tired or having little energy -   Poor appetite, weight loss, or overeating -   Feeling bad about yourself - or that you are a failure or have let yourself or your family down -   Trouble concentrating on things such as school, work, reading, or watching TV -   Moving or speaking so slowly that other people could have noticed; or the opposite being so fidgety that others notice -   Thoughts of being better off dead, or hurting yourself in some way -   PHQ 9 Score -   How difficult have these problems made it for you to do your work, take care of your home and get along with others -         Health Maintenance Due   Topic Date Due    DTaP/Tdap/Td series (1 - Tdap) Never done    Cervical cancer screen  Never done    Shingrix Vaccine Age 50> (1 of 2) Never done    Low dose CT lung screening Never done    Colorectal Cancer Screening Combo  07/15/2022    Flu Vaccine (1) Never done       Learning Assessment 2/12/2020   PRIMARY LEARNER Patient   HIGHEST LEVEL OF EDUCATION - PRIMARY LEARNER  GRADUATED HIGH SCHOOL OR GED   BARRIERS PRIMARY LEARNER NONE   CO-LEARNER CAREGIVER No   PRIMARY LANGUAGE ENGLISH   LEARNER PREFERENCE PRIMARY LISTENING   ANSWERED BY Christiane Licea   RELATIONSHIP SELF

## 2022-10-03 NOTE — PROGRESS NOTES
Adelaida Victoria. [address]      Baltazar Peters is a 60 y.o. female who was seen by synchronous (real-time) audio-video technology on 10/3/2022 for Cough and Cold Symptoms        Assessment & Plan:   Diagnoses and all orders for this visit:    1. Viral upper respiratory tract infection  Most likely viral, recommendation and symptomatic treatments provided  Patient verbalized understanding and is in agreement with this plan of care    Follow-up and Dispositions    Return for keep previously scheduled follow up. 712  Subjective:     Nasal congestion  Onset a few days ago  Accompanied by sneezing and frontal headache  Did not test for COVID: is immunized and boosted  Treatments: tylenol  Denies fever        Prior to Admission medications    Medication Sig Start Date End Date Taking? Authorizing Provider   atenoloL (TENORMIN) 50 mg tablet TAKE 1 TABLET BY MOUTH DAILY 8/1/22  Yes Bienvenido Barajas NP   amLODIPine (NORVASC) 2.5 mg tablet  4/2/22  Yes Provider, Historical   atorvastatin (LIPITOR) 10 mg tablet TAKE 1 TABLET BY MOUTH DAILY 5/13/22  Yes Bienvenido Barajas NP   amLODIPine (NORVASC) 5 mg tablet Take 1.5 Tablets by mouth daily. 2/16/22  Yes Bienvenido Barajas NP   acetaminophen (TYLENOL) 500 mg tablet Take 500 mg by mouth every six (6) hours as needed for Pain. Yes Provider, Historical   celecoxib (CELEBREX) 100 mg capsule TK 1 C PO BID 10/20/20  Yes Provider, Historical   hydroxychloroquine (PLAQUENIL) 200 mg tablet Take 200 mg by mouth two (2) times a day. 2/7/20  Yes Provider, Historical   esomeprazole (NEXIUM) 20 mg capsule Take  by mouth. Yes Provider, Historical   predniSONE (STERAPRED) 5 mg dose pack Take 1 Tablet by mouth See Admin Instructions.  See administration instruction per 5mg dose pack  Patient not taking: Reported on 10/3/2022 8/29/22 10/3/22  Ewelina BUNDY NP   hydrocortisone (CORTAID) 1 % topical cream Apply  to affected area two (2) times a day. use thin layer  Patient not taking: Reported on 10/3/2022 8/29/22 10/3/22  Ace [place] A, NP   gabapentin (NEURONTIN) 300 mg capsule Take 300 mg by mouth three (3) times daily. Patient not taking: Reported on 10/3/2022 5/20/22 10/3/22  Provider, Historical   methocarbamoL (ROBAXIN) 500 mg tablet Take 1,000 mg by mouth four (4) times daily. Patient not taking: Reported on 10/3/2022 6/1/22 10/3/22  Provider, Historical     Patient Active Problem List   Diagnosis Code    Tobacco use Z72.0    Degenerative joint disease (DJD) of hip M16.9    History of DVT (deep vein thrombosis) Z86.718    Rheumatoid arthritis involving multiple sites ([facility] [address] M06.9    Raynaud's disease without gangrene I73.00    Essential hypertension I10    Anxiety F41.9    Cervical radiculopathy M54.12    Arthritis of carpometacarpal [place]) joint of left thumb M18.12    Cervical spondylosis with radiculopathy M47.22    Chronic wrist pain, left M25.532, G89.29    Subluxation of distal end of left ulna S63.072A    Ulnocarpal abutment syndrome, 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    Other osteoporosis without current pathological fracture M81.8    Thoracic aortic aneurysm without rupture I71.20     Past Surgical History:   Procedure Laterality Date    HX CERVICAL DISKECTOMY      HX CHOLECYSTECTOMY      HX HERNIA REPAIR  2019     Family History   Family history unknown: Yes     Social History     Tobacco Use    Smoking status: Former     Packs/day: 1.00     Years: 25.00     Pack years: 25.00     Types: Cigarettes     Quit date: 2021     Years since quittin.0    Smokeless tobacco: Never   Substance Use Topics    Alcohol use: No       ROS  As stated in HPI, otherwise all others negative. Objective:   No flowsheet data found.    General: alert, cooperative, no distress   Mental  status: normal mood, behavior, speech, dress, motor activity, and thought processes, able to follow commands   HENT: NCAT   Neck: no visualized mass   Resp: no respiratory distress   Neuro: no gross deficits   Skin: no discoloration or lesions of concern on visible areas   Psychiatric: normal affect, consistent with stated mood, no evidence of hallucinations     Additional exam findings: We discussed the expected course, resolution and complications of the diagnosis(es) in detail. Medication risks, benefits, costs, interactions, and alternatives were discussed as indicated. I advised her to contact the office if her condition worsens, changes or fails to improve as anticipated. She expressed understanding with the diagnosis(es) and plan. May Corona, was evaluated through a synchronous (real-time) audio-video encounter. The patient (or guardian if applicable) is aware that this is a billable service, which includes applicable co-pays. Verbal consent to proceed has been obtained. The visit was conducted pursuant to the emergency declaration under the [facility], [facility] waiver authority and the [employer] and [employer] General Act. Patient identification was verified, and a caregiver was present when appropriate. The patient was located at home in a state where the provider was licensed to provide care. SILVERIO [facility]  [address] [place] [address] Rd.   Lele Cunningham

## 2022-11-22 ENCOUNTER — OFFICE VISIT (OUTPATIENT)
Dept: FAMILY MEDICINE CLINIC | Age: 62
End: 2022-11-22
Payer: MEDICAID

## 2022-11-22 VITALS
TEMPERATURE: 97.8 F | WEIGHT: 149 LBS | HEART RATE: 60 BPM | HEIGHT: 67 IN | OXYGEN SATURATION: 98 % | DIASTOLIC BLOOD PRESSURE: 62 MMHG | RESPIRATION RATE: 18 BRPM | BODY MASS INDEX: 23.39 KG/M2 | SYSTOLIC BLOOD PRESSURE: 130 MMHG

## 2022-11-22 DIAGNOSIS — M06.9 RHEUMATOID ARTHRITIS INVOLVING MULTIPLE SITES, UNSPECIFIED WHETHER RHEUMATOID FACTOR PRESENT (HCC): ICD-10-CM

## 2022-11-22 DIAGNOSIS — I71.20 THORACIC AORTIC ANEURYSM WITHOUT RUPTURE, UNSPECIFIED PART: ICD-10-CM

## 2022-11-22 DIAGNOSIS — Z00.00 WELL WOMAN EXAM (NO GYNECOLOGICAL EXAM): Primary | ICD-10-CM

## 2022-11-22 DIAGNOSIS — M54.50 CHRONIC MIDLINE LOW BACK PAIN WITHOUT SCIATICA: ICD-10-CM

## 2022-11-22 DIAGNOSIS — G89.29 CHRONIC MIDLINE LOW BACK PAIN WITHOUT SCIATICA: ICD-10-CM

## 2022-11-22 DIAGNOSIS — I10 ESSENTIAL HYPERTENSION: ICD-10-CM

## 2022-11-22 PROCEDURE — 99396 PREV VISIT EST AGE 40-64: CPT | Performed by: NURSE PRACTITIONER

## 2022-11-22 PROCEDURE — 3078F DIAST BP <80 MM HG: CPT | Performed by: NURSE PRACTITIONER

## 2022-11-22 PROCEDURE — 3074F SYST BP LT 130 MM HG: CPT | Performed by: NURSE PRACTITIONER

## 2022-11-22 RX ORDER — METHOCARBAMOL 500 MG/1
500 TABLET, FILM COATED ORAL 4 TIMES DAILY
Qty: 90 TABLET | Refills: 3 | Status: SHIPPED | OUTPATIENT
Start: 2022-11-22

## 2022-11-22 NOTE — PROGRESS NOTES
Adelaida Victoria. [facility] Kishore Abernathy is a 60 y.o. female and presents with Physical       Assessment/Plan:    Diagnoses and all orders for this visit:    1. Well woman exam (no gynecological exam)  Completed today, no new problems identified  2. Essential hypertension  Endorses medication compliance, blood pressure stable continue metoprolol and amlodipine at same dose    3. Rheumatoid arthritis involving multiple sites, unspecified whether rheumatoid factor present [facility])  Assessment & Plan:   monitored by specialist. No acute findings meriting change in the plan      4. Thoracic aortic aneurysm without rupture, unspecified part  Assessment & Plan:   monitored by specialist. No acute findings meriting change in the plan  [facility] provided on AVS    5. Chronic midline low back pain without sciatica  -     methocarbamoL (ROBAXIN) 500 mg tablet; Take 1 Tablet by mouth four (4) times daily. Follow-up and Dispositions    Return in about 4 months (around 3/22/2023) for HTN, 15 min, office only, w/labs prior. Health Maintenance:   Health Maintenance   Topic Date Due    DTaP/Tdap/Td series (1 - Tdap) Never done    Cervical cancer screen  Never done    Shingrix Vaccine Age 50> (1 of 2) Never done    Low dose CT lung screening  Never done    Flu Vaccine (1) Never done    COVID-19 Vaccine (5 - Booster for Moderna series) 11/26/2022    Colorectal Cancer Screening Combo  09/18/2023    Lipid Screen  11/08/2023    Depression Screen  11/22/2023    Breast Cancer Screen Mammogram  08/01/2024    Hepatitis C Screening  Completed    Bone Densitometry (Dexa) Screening  Completed    Pneumococcal 0-64 years  Aged Out        Subjective:    Labs obtained prior to visit? YES  Reviewed with patient?  Yes    Hypertension:  Visit Vitals  /62   Pulse 60   Temp 97.8 °F (36.6 °C) (Temporal)   Resp 18   Ht 5' 7\" (1.702 m) Wt 149 lb (67.6 kg)   SpO2 98%   BMI 23.34 kg/m²      Patient reports taking medications as instructed. yes   Medication side effects noted. no  Headache upon wakening. no   Home BP monitoring: Does not check  Do you experience chest pain/pressure or SOB with exertion? no  Maintain a low Sodium diet? yes  Key CAD CHF Meds               atenoloL (TENORMIN) 50 mg tablet (Taking) TAKE 1 TABLET BY MOUTH DAILY    amLODIPine (NORVASC) 2.5 mg tablet (Taking)     atorvastatin (LIPITOR) 10 mg tablet (Taking) TAKE 1 TABLET BY MOUTH DAILY    amLODIPine (NORVASC) 5 mg tablet (Taking) Take 1.5 Tablets by mouth daily. ROS:     Review of Systems   Constitutional: Negative. HENT: Negative. Eyes: Negative. Respiratory: Negative. Cardiovascular: Negative. Gastrointestinal: Negative. Genitourinary:  Positive for frequency. Urinates 3-4 times a night: this has been going on for a couple of months, states she drinks a lot of water   Musculoskeletal:  Positive for back pain and joint pain. Bilateral hip pain: onset about 2 months ago  The left side: pmh of replacement 2019, intermittent, pain is not present upon waking up, happens after walking around  The right side: sometimes when lying on it it will hurt, pain happens after walking a long while, walking up and down the stairs causes pain. This is a new problem for the right hip    Back pain: in 2007 pulled her back and had x-rays done and was told she had a bulging disc, continues to have intermittent pain, taking robaxin helps   Skin: Negative. Neurological: Negative. Psychiatric/Behavioral: Negative. The problem list was updated as a part of today's visit.   Patient Active Problem List   Diagnosis Code    Tobacco use Z72.0    Degenerative joint disease (DJD) of hip M16.9    History of DVT (deep vein thrombosis) Z86.718    Rheumatoid arthritis involving multiple sites ([facility] [address] M06.9    Raynaud's disease without gangrene I73.00 Essential hypertension I10    Anxiety F41.9    Cervical radiculopathy M54.12    Arthritis of carpometacarpal [place]) joint of left thumb M18.12    Cervical spondylosis with radiculopathy M47.22    Chronic wrist pain, left M25.532, G89.29    Subluxation of distal end of left ulna S63.072A    Ulnocarpal abutment syndrome, 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 SH:  Social History     Tobacco Use    Smoking status: Former     Packs/day: 1.00     Years: 25.00     Pack years: 25.00     Types: Cigarettes     Quit date: 2021     Years since quittin.1    Smokeless tobacco: Never   Vaping Use    Vaping Use: Never used   Substance Use Topics    Alcohol use: No    Drug use: No       Medications/Allergies:  Current Outpatient Medications on File Prior to Visit   Medication Sig Dispense Refill    atenoloL (TENORMIN) 50 mg tablet TAKE 1 TABLET BY MOUTH DAILY 90 Tablet 3    amLODIPine (NORVASC) 2.5 mg tablet       atorvastatin (LIPITOR) 10 mg tablet TAKE 1 TABLET BY MOUTH DAILY 90 Tablet 3    amLODIPine (NORVASC) 5 mg tablet Take 1.5 Tablets by mouth daily. 45 Tablet 0    acetaminophen (TYLENOL) 500 mg tablet Take 500 mg by mouth every six (6) hours as needed for Pain. celecoxib (CELEBREX) 100 mg capsule TK 1 C PO BID      hydroxychloroquine (PLAQUENIL) 200 mg tablet Take 200 mg by mouth two (2) times a day. esomeprazole (NEXIUM) 20 mg capsule Take  by mouth. No current facility-administered medications on file prior to visit.         Allergies   Allergen Reactions    Keflex [Cephalexin] Rash       Objective:  Visit Vitals  /62   Pulse 60   Temp 97.8 °F (36.6 °C) (Temporal)   Resp 18   Ht 5' 7\" (1.702 m)   Wt 149 lb (67.6 kg)   SpO2 98%   BMI 23.34 kg/m²    Body mass index is 23.34 kg/m². Physical assessment  Physical Exam  Vitals and nursing note reviewed. Constitutional:       Appearance: Normal appearance. HENT:      Head: Normocephalic and atraumatic. Right Ear: Hearing, tympanic membrane, ear canal and external ear normal.      Left Ear: Hearing, tympanic membrane, ear canal and external ear normal.      Nose: Nose normal.      Mouth/Throat:      Pharynx: Uvula midline. Eyes:      General: Lids are normal.      Conjunctiva/sclera: Conjunctivae normal.      Pupils: Pupils are equal, round, and reactive to light. Neck:      Trachea: Trachea normal. No tracheal deviation. Cardiovascular:      Rate and Rhythm: Normal rate and regular rhythm. Heart sounds: Normal heart sounds, S1 normal and S2 normal.   Pulmonary:      Effort: Pulmonary effort is normal.      Breath sounds: Normal breath sounds. Abdominal:      General: Bowel sounds are normal.      Palpations: Abdomen is soft. Tenderness: There is no abdominal tenderness. Musculoskeletal:         General: Normal range of motion. Cervical back: Normal range of motion and neck supple. Right hip: No deformity, tenderness or bony tenderness. Normal range of motion. Left hip: No deformity, tenderness or bony tenderness. Normal range of motion. Lymphadenopathy:      Head:      Right side of head: No submental, submandibular, tonsillar, preauricular, posterior auricular or occipital adenopathy. Left side of head: No submental, submandibular, tonsillar, preauricular, posterior auricular or occipital adenopathy. Cervical: No cervical adenopathy. Skin:     General: Skin is warm and dry. Neurological:      Mental Status: She is alert. Motor: Motor function is intact.          Labwork and Ancillary Studies:    CBC w/Diff  Lab Results   Component Value Date/Time    WBC 5.2 02/28/2019 01:53 PM    HGB 12.6 02/28/2019 01:53 PM    PLATELET 389 76/47/0859 01:53 PM         Basic Metabolic Profile  Lab Results   Component Value Date/Time    Sodium 138 11/08/2022 10:11 AM    Potassium 5.1 11/08/2022 10:11 AM    Chloride 101 11/08/2022 10:11 AM    CO2 30 11/08/2022 10:11 AM    Anion gap 7.0 11/08/2022 10:11 AM    Glucose 124 (H) 11/08/2022 10:11 AM    BUN 13 11/08/2022 10:11 AM    Creatinine 0.7 (L) 11/08/2022 10:11 AM    GFR est AA >60.0 10/15/2017 02:40 AM    GFR est non-AA >60 10/15/2017 02:40 AM    Calcium 9.7 11/08/2022 10:11 AM        Cholesterol  Lab Results   Component Value Date/Time    Cholesterol, total 107 (L) 11/08/2022 10:11 AM    HDL Cholesterol 44 11/08/2022 10:11 AM    LDL, calculated 43 (L) 11/08/2022 10:11 AM    Triglyceride 100 11/08/2022 10:11 AM           I have discussed the diagnosis with the patient and the intended plan as seen in the above orders. The patient has received an After-Visit Summary and questions were answered concerning future plans. An After Visit Summary was printed and given to the patient. All diagnosis have been discussed with the patient and all of the patient's questions have been answered. Follow-up and Dispositions    Return in about 4 months (around 3/22/2023) for HTN, 15 min, office only, w/labs prior. Rod Day, [facility]  [address]  [id number]

## 2022-11-22 NOTE — PROGRESS NOTES
Room 7     When asked if patient has any concerns she would like to address with AMRITA Feliciano patient states yes  would like to discuss taking Methocarbamol 500 mg due to back pain . Did patient bring someone? No    Did the patient have DME equipment? No     Did you take your medication today? Yes       1. \"Have you been to the ER, urgent care clinic since your last visit? Hospitalized since your last visit? \" No    2. \"Have you seen or consulted any other health care providers outside of the [address] [place] [address] since your last visit? \" No     3. For patients aged 39-70: Has the patient had a colonoscopy / FIT/ Cologuard? Yes - no Care Gap present      If the patient is female:    4. For patients aged 41-77: Has the patient had a mammogram within the past 2 years? Yes - no Care Gap present      5. For patients aged 21-65: Has the patient had a pap smear? No Patient has appointment for pap today.          3 most recent PHQ Screens 11/22/2022   Little interest or pleasure in doing things More than half the days   Feeling down, depressed, irritable, or hopeless Not at all   Total Score PHQ 2 2   Trouble falling or staying asleep, or sleeping too much -   Feeling tired or having little energy -   Poor appetite, weight loss, or overeating -   Feeling bad about yourself - or that you are a failure or have let yourself or your family down -   Trouble concentrating on things such as school, work, reading, or watching TV -   Moving or speaking so slowly that other people could have noticed; or the opposite being so fidgety that others notice -   Thoughts of being better off dead, or hurting yourself in some way -   PHQ 9 Score -   How difficult have these problems made it for you to do your work, take care of your home and get along with others -         Health Maintenance Due   Topic Date Due    DTaP/Tdap/Td series (1 - Tdap) Never done    Cervical cancer screen  Never done    Shingrix Vaccine Age 50> (1 of 2) Never done    Low dose CT lung screening  Never done    Flu Vaccine (1) Never done    COVID-19 Vaccine (5 - Booster for Moderna series) 11/26/2022       Learning Assessment 2/12/2020   PRIMARY LEARNER Patient   HIGHEST LEVEL OF EDUCATION - PRIMARY LEARNER  GRADUATED HIGH SCHOOL OR GED   BARRIERS PRIMARY LEARNER NONE   CO-LEARNER CAREGIVER No   PRIMARY LANGUAGE ENGLISH   LEARNER PREFERENCE PRIMARY LISTENING   ANSWERED BY Cris Vines   RELATIONSHIP SELF

## 2022-11-22 NOTE — PATIENT INSTRUCTIONS
Cardiovascular surgery for your aneurysm  Last appointment was June 2021  You used to see Dr. [facility]    [address] [place] [address]    [phone]

## 2022-12-20 ENCOUNTER — TELEPHONE (OUTPATIENT)
Dept: FAMILY MEDICINE CLINIC | Age: 62
End: 2022-12-20

## 2022-12-20 DIAGNOSIS — R05.2 SUBACUTE COUGH: Primary | ICD-10-CM

## 2022-12-20 NOTE — TELEPHONE ENCOUNTER
Patient could in c/o having a dry cough and congestion for 3 weeks now. Patient does have a virtual appointment in January, but would like to know if you can prescribe her some cough medication in the mean time.  Please advise and review

## 2023-01-03 RX ORDER — BENZONATATE 200 MG/1
200 CAPSULE ORAL
Qty: 90 CAPSULE | Refills: 0 | Status: SHIPPED | OUTPATIENT
Start: 2023-01-03

## 2023-02-22 ENCOUNTER — TELEMEDICINE (OUTPATIENT)
Facility: CLINIC | Age: 63
End: 2023-02-22
Payer: MEDICAID

## 2023-02-22 DIAGNOSIS — M25.512 CHRONIC LEFT SHOULDER PAIN: Primary | ICD-10-CM

## 2023-02-22 DIAGNOSIS — G89.29 CHRONIC LEFT SHOULDER PAIN: Primary | ICD-10-CM

## 2023-02-22 PROCEDURE — 99213 OFFICE O/P EST LOW 20 MIN: CPT | Performed by: NURSE PRACTITIONER

## 2023-02-22 SDOH — ECONOMIC STABILITY: TRANSPORTATION INSECURITY
IN THE PAST 12 MONTHS, HAS THE LACK OF TRANSPORTATION KEPT YOU FROM MEDICAL APPOINTMENTS OR FROM GETTING MEDICATIONS?: NO

## 2023-02-22 SDOH — ECONOMIC STABILITY: FOOD INSECURITY: WITHIN THE PAST 12 MONTHS, THE FOOD YOU BOUGHT JUST DIDN'T LAST AND YOU DIDN'T HAVE MONEY TO GET MORE.: NEVER TRUE

## 2023-02-22 SDOH — ECONOMIC STABILITY: INCOME INSECURITY: IN THE LAST 12 MONTHS, WAS THERE A TIME WHEN YOU WERE NOT ABLE TO PAY THE MORTGAGE OR RENT ON TIME?: NO

## 2023-02-22 SDOH — ECONOMIC STABILITY: HOUSING INSECURITY
IN THE LAST 12 MONTHS, WAS THERE A TIME WHEN YOU DID NOT HAVE A STEADY PLACE TO SLEEP OR SLEPT IN A SHELTER (INCLUDING NOW)?: NO

## 2023-02-22 SDOH — ECONOMIC STABILITY: FOOD INSECURITY: WITHIN THE PAST 12 MONTHS, YOU WORRIED THAT YOUR FOOD WOULD RUN OUT BEFORE YOU GOT MONEY TO BUY MORE.: NEVER TRUE

## 2023-02-22 SDOH — ECONOMIC STABILITY: TRANSPORTATION INSECURITY
IN THE PAST 12 MONTHS, HAS LACK OF TRANSPORTATION KEPT YOU FROM MEETINGS, WORK, OR FROM GETTING THINGS NEEDED FOR DAILY LIVING?: NO

## 2023-02-22 ASSESSMENT — SOCIAL DETERMINANTS OF HEALTH (SDOH): HOW HARD IS IT FOR YOU TO PAY FOR THE VERY BASICS LIKE FOOD, HOUSING, MEDICAL CARE, AND HEATING?: NOT HARD AT ALL

## 2023-02-22 NOTE — PROGRESS NOTES
Please use this number [phone]    When asked if patient has any concerns he/she would like to address with FATEMEH Reaves patient states yes my left arm has been hurting really bad near the bra strap area I can not lift anything and I am having hard time pulling my pants up . Did patient bring someone? No     Did you take your medication today? Yes       1. \"Have you been to the ER, urgent care clinic since your last visit? Hospitalized since your last visit? \" No     2. \"Have you seen or consulted any other health care providers outside of the [address] [place] [address] since your last visit? \" No     3. For patients aged 39-70: Has the patient had a colonoscopy / FIT/ Cologuard? Yes      If the patient is female:    4. For patients aged 41-77: Has the patient had a mammogram within the past 2 years? Yes       5. For patients aged 21-65: Has the patient had a pap smear? {Cancer Care Gap present? PHQ-9  11/22/2022   Little interest or pleasure in doing things 2   Little interest or pleasure in doing things -   Feeling down, depressed, or hopeless 0   PHQ-2 Score 2   Total Score PHQ 2 -   PHQ-9 Total Score 2          Health Maintenance Due   Topic Date Due    HIV screen  Never done    DTaP/Tdap/Td vaccine (1 - Tdap) Never done    Cervical cancer screen  Never done    Shingles vaccine (1 of 2) Never done    Low dose CT lung screening  Never done    Flu vaccine (1) Never done    COVID-19 Vaccine (5 - Booster for Moderna series) 11/26/2022         Who is the primary learner? Patient    What is the preferred language for health care of the primary learner? ENGLISH    How does the primary learner prefer to learn new concepts?  DEMONSTRATION    Answered By patient    Relationship to Learner SELF

## 2023-02-22 NOTE — PROGRESS NOTES
Lilian Starkey (:  1960) is a Established patient, here for evaluation of the following:    Assessment & Plan   Below is the assessment and plan developed based on review of pertinent history, physical exam, labs, studies, and medications. 1. Chronic left shoulder pain  -     [address] [facility] - [place], [place]  Denies any trauma, states could be the way she was sleeping with her arm in a weird position, will try PT, refer to ortho if PT fails  Patient verbalized understanding and is in agreement with this plan of care    Return for keep previously scheduled follow up. Subjective   HPI  Left arm pain  Located at the left shoulder: where the bra strap goes  Onset: last week  She does not recall any trauma, thought she slept on it wrong  Has pain at rest and with movement  Can lift her arm over her head, with the elbow bent, but it is painful and painful when placing her arm back down  PMH: fell on left arm a couple of years ago  Denies any crepitus or popping with palpation, just pain  Cannot put her arm behind her without pain  Denies numbness or tingling in hand or arm  Denies neck pain or discomfort, just some residual numbness from surgery  Treatments: tylenol: did not help much    Review of Systems   As stated in HPI, otherwise all others negative. Objective   Patient-Reported Vitals  No data recorded     Physical Exam  Grimaces when moving her arm around           Lilian Starkey, was evaluated through a synchronous (real-time) audio-video encounter. The patient (or guardian if applicable) is aware that this is a billable service, which includes applicable co-pays. This Virtual Visit was conducted with patient's (and/or legal guardian's) consent. The visit was conducted pursuant to the emergency declaration under the [facility], [address] [place] [address] authority and the [employer] General Act. Patient identification was verified, and a caregiver was present when appropriate.    The patient was located at Home: [address] [place] [address]  Provider was located at [facility] (Appt Dept): [address] , [address]  Elfego Ambriz 8141, APRN - CNP

## 2023-03-03 ENCOUNTER — HOSPITAL ENCOUNTER (OUTPATIENT)
Facility: HOSPITAL | Age: 63
Setting detail: RECURRING SERIES
Discharge: HOME | End: 2023-03-06
Payer: MEDICAID

## 2023-03-03 PROCEDURE — 97162 PT EVAL MOD COMPLEX 30 MIN: CPT

## 2023-03-03 NOTE — PROGRESS NOTES
PHYSICAL / OCCUPATIONAL THERAPY - DAILY TREATMENT NOTE (updated )  For Eval visit    Patient Name: Nina Christy    Date: 3/3/2023    : 1960  Insurance: Payor: Dilshad Steen / Plan: Dilshad Steen / Product Type: *No Product type* /      Patient  verified yes     Visit #   Current / Total 1 13   Time   In / Out 11:10 11:50   Pain   In / Out 2 3   Subjective Functional Status/Changes: See POC   Changes to:  Meds, Allergies, Med Hx, Sx Hx? If yes, update Summary List no       TREATMENT AREA =  Chronic left shoulder pain [M25.512, G89.29]    OBJECTIVE       40 min   Eval - untimed                      Therapeutic Procedures: Tx Min Billable or 1:1 Min (if diff from [employer]) Procedure, Rationale, Specifics          Details if applicable:            Details if applicable:            Details if applicable:            Details if applicable:       [facility] BC Totals Reminder: bill using total billable min of TIMED therapeutic procedures (example: do not include dry needle or estim unattended, both untimed codes, in totals to left)  8-22 min = 1 unit; 23-37 min = 2 units; 38-52 min = 3 units; 53-67 min = 4 units; 68-82 min = 5 units   Total Total     [x]  Patient Education billed concurrently with other procedures   [x] Review HEP    [] Progressed/Changed HEP, detail:    [] Other detail:       Objective Information/Functional Measures/Assessment    See POC    Patient will continue to benefit from skilled PT / OT services to modify and progress therapeutic interventions, analyze and address functional mobility deficits, analyze and address ROM deficits, analyze and address strength deficits, analyze and address soft tissue restrictions, analyze and cue for proper movement patterns, analyze and modify for postural abnormalities, and instruct in home and community integration to address functional deficits and attain remaining goals.     Progress toward goals / Updated goals:  [x]  See POC    See POC    PLAN  yes Continue plan of care  [x]  Upgrade activities as tolerated  []  Discharge due to :  []  Other:    Dossie Render, PT    3/3/2023    7:59 AM    Future Appointments   Date Time Provider Lashay Brito   3/3/2023 11:00 AM Dossie Render, PT [facility]   3/22/2023 10:30 AM NITA Santiago - CNP AMA BS AMB   8/4/2023 10:00 AM [facility] [facility] BX RM 1 [facility]

## 2023-03-03 NOTE — THERAPY EVALUATION
[address] [place] [address], [address] DA:[phone] Fx: [phone]  Plan of Care / Statement of Necessity for Physical Therapy Services     Patient Name: Andrea Maza : 1960   Medical   Diagnosis: Chronic left shoulder pain [M25.512, G89.29] Treatment Diagnosis: L shoulder pain   Onset Date: 2023     Referral Source: Sherita Payne* Start of Care [facility]): 3/3/2023   Prior Hospitalization: See medical history Provider #: [id number]   Prior Level of Function: No pain with reaching   Comorbidities: Hx of neck decompression , arthritis   Medications: Verified on Patient Summary List     Assessment / key information:  Andrea Maza is a 60 y.o. female with Dx: L shoulder pain starting about 2 months ago insidiously. Pt is LHD. Reports throbbing pain along the superior shoulder. More pain when reaching behind her, reaching over head and reaching out to the side. Notes she also had a fall in October while walking on the sidewalk but doesn't think that caused the L shoulder pain. Denies neck pain. Denies N/T. Pt would like to get back to vacuuming, cooking, and reaching without pain. Pt rates pain as 4/10 max, 2/10 at best, 2/10 currently. Occupation:  work for   Objective: FOTO score = 53 (an established functional score where 100 = no disability). Posture: Forward head with rounded shoulders, pt leaning to L in chair  Palpation TTP along L UT, L levator, L rhomboids, L infraspinatus. Shoulder ROM Flexion R 145 L 118 with pain, ABD R 163 L 86 with pain, Fxn ER R T2 L C5 with pain, Fxn IR R T8 L L greater trochanter.   Strength: Shoulder Flexion R 4+/5 L 3/5 with pain, ABD R 4+/5 L 3/5 with pain, ER R 4+/5 L 4/5 with pain, IR R 4+/5 L 4/5 with pain, Rhomboids 4/5, LT NT  Special Tests: +empty can, + [employer], - ER lag, + painful arc  Current deficits include decreased ROM and strength in LUE and scapular musculature leading to pain with reaching and repetitive movements. Pt will benefit from a comprehensive POC/HEP to address impairments and restore function in order to return to prior level of function and prevent secondary impairments. Evaluation Complexity:  History:  MEDIUM  Complexity : 1-2 comorbidities / personal factors will impact the outcome/ POC ; Examination:  HIGH Complexity : 4+ Standardized tests and measures addressing body structure, function, activity limitation and / or participation in recreation  ;Presentation:  MEDIUM Complexity : Evolving with changing characteristics  ; Clinical Decision Making:  MEDIUM Complexity : FOTO score of 26-74 FOTO score = an established functional score where 100 = no disability  Overall Complexity Rating: MEDIUM  Problem List: pain affecting function, decrease ROM, decrease strength, decrease ADL/functional abilities, decrease activity tolerance, and decrease flexibility/joint mobility   Treatment Plan may include any combination of the followin Therapeutic Exercise, 25125 Neuromuscular Re-Education, 60467 Manual Therapy, 69521 Therapeutic Activity, 33621 Self Care/Home Management, 95165 Electrical Stim unattended, 52903 Electrical Stim attended, 44535 Ultrasound, [id number] Mechanical Traction, [id number] Needle Insertion w/o Injection (1 or 2 muscles), and 36554 Needle Insertion w/o Injection (3+ muscles)  Patient / Family readiness to learn indicated by: asking questions, trying to perform skills, interest, return verbalization , and return demonstration   Persons(s) to be included in education: patient (P)  Barriers to Learning/Limitations: none  Measures taken if barriers to learning present: na  Patient Goal (s): no more pain when moving my arm  Patient Self Reported Health Status: excellent  Rehabilitation Potential: good    Short Term Goals: To be accomplished in 3 weeks  Independent with HEP to progress to meet goals.   2. Pt to report decreased max pain levels less than 3/10 for improvement in ADLs. Long Term Goals: To be accomplished in 6 weeks  Improve FOTO score to 69/100 to show a significant functional change. 2. Pt to report 50% decrease in symptoms for improvement in QoL. 3. Pt to demonstrate L shoulder flexion to 140 and Fxn IR to L5 to assist with dressing. Frequency / Duration: Patient to be seen 2 times per week for 6 weeks    Patient/ Caregiver education and instruction: Diagnosis, prognosis, self care, activity modification, and exercises [x]  Plan of care has been reviewed with PTA    Certification Period: shauna Rae, PT       3/3/2023       8:00 AM  ===================================================================  I certify that the above Therapy Services are being furnished while the patient is under my care. I agree with the treatment plan and certify that this therapy is necessary. [de-identified] Signature:_________________________   DATE:_________   TIME:________                           Clarice Estimable, AP*    ** Signature, Date and Time must be completed for valid certification **  Please sign and fax to [facility] [phone].   Thank you

## 2023-03-07 RX ORDER — BENZONATATE 200 MG/1
CAPSULE ORAL
Qty: 90 CAPSULE | Refills: 0 | Status: SHIPPED | OUTPATIENT
Start: 2023-03-07

## 2023-03-10 ENCOUNTER — HOSPITAL ENCOUNTER (OUTPATIENT)
Facility: HOSPITAL | Age: 63
Setting detail: RECURRING SERIES
Discharge: HOME | End: 2023-03-13
Payer: MEDICAID

## 2023-03-10 PROCEDURE — 97110 THERAPEUTIC EXERCISES: CPT

## 2023-03-10 PROCEDURE — 97112 NEUROMUSCULAR REEDUCATION: CPT

## 2023-03-10 NOTE — PROGRESS NOTES
PHYSICAL / OCCUPATIONAL THERAPY - DAILY TREATMENT NOTE (updated )    Patient Name: Leighton Arce    Date: 3/10/2023    : 1960  Insurance: Payor: Tab Gates / Plan: Tab Gates / Product Type: *No Product type* /      Patient  verified yes     Visit #   Current / Total 3 13   Time   In / Out 3:00 pm 3:40 pm   Pain   In / Out 4 3   Subjective Functional Status/Changes: Patient reports she feels sore but some pain with arm use today. Changes to:  Meds, Allergies, Med Hx, Sx Hx? If yes, update Summary List no       TREATMENT AREA =  Chronic left shoulder pain [M25.512, G89.29]    OBJECTIVE    Modalities Rationale:     decrease inflammation and decrease pain to improve patient's ability to progress to PLOF and address remaining functional goals. min [] Estim Unattended, type/location:                                      []  w/ice    []  w/heat    min [] Estim Attended, type/location:                                     []  w/US     []  w/ice    []  w/heat    []  TENS insruct      min []  Mechanical Traction: type/lbs                   []  pro   []  sup   []  int   []  cont    []  before manual    []  after manual    min []  Ultrasound, settings/location:      min []  Iontophoresis w/ dexamethasone, location:                                               []  take home patch       []  in clinic   10 min  unbilled [x]  Ice     []  Heat    location/position: Left shoulder ant/post; reclined    min []  Paraffin,  details:     min []  Vasopneumatic Device, press/temp:     min []  Colan Pear / Rodriguez Belts: If using vaso (only need to measure limb vaso being performed on)      pre-treatment girth :       post-treatment girth :       measured at (landmark location) :      min []  Other:    Skin assessment post-treatment (if applicable):    [x]  intact    []  redness- no adverse reaction                 []redness - adverse reaction:        Therapeutic Procedures:   Tx Min Billable or 1:1 Min (if diff from Tx Min) Procedure, Rationale, Specifics   15  77975 Therapeutic Exercise (timed):  increase ROM, strength, coordination, balance, and proprioception to improve patient's ability to progress to PLOF and address remaining functional goals. (see flow sheet as applicable)     Details if applicable:       10  17346 Neuromuscular Re-Education (timed):  improve balance, coordination, kinesthetic sense, posture, core stability and proprioception to improve patient's ability to develop conscious control of individual muscles and awareness of position of extremities in order to progress to PLOF and address remaining functional goals. (see flow sheet as applicable)     Details if applicable:     5  49674 Manual Therapy (timed):  decrease pain, increase ROM, and increase postural awareness to improve patient's ability to progress to PLOF and address remaining functional goals. The manual therapy interventions were performed at a separate and distinct time from the therapeutic activities interventions . (see flow sheet as applicable)     Details if applicable:  gentle PROM totolerance   30  [facility] BC Totals Reminder: bill using total billable min of TIMED therapeutic procedures (example: do not include dry needle or estim unattended, both untimed codes, in totals to left)  8-22 min = 1 unit; 23-37 min = 2 units; 38-52 min = 3 units; 53-67 min = 4 units; 68-82 min = 5 units   Total Total     [x]  Patient Education billed concurrently with other procedures   [x] Review HEP    [] Progressed/Changed HEP, detail:    [] Other detail:       Objective Information/Functional Measures/Assessment    Verbal or Tactile cues required: for all therex for improved carryover and for form   Posture/positioning: mild kyphosis; slight FTL and rounded shoulders   ROM: WNL     Therex and NMR as per flow sheet.      Patient will continue to benefit from skilled PT / OT services to modify and progress therapeutic interventions, analyze and address functional mobility deficits, analyze and address ROM deficits, analyze and address strength deficits, analyze and address soft tissue restrictions, analyze and cue for proper movement patterns, and analyze and modify for postural abnormalities to address functional deficits and attain remaining goals. Progress toward goals / Updated goals:  []  See Progress Note/Recertification    Patient tolerated today's treatment well. Progressed/added therex as follows: prone T, and Tband ER and horizontal abd with orange band. Discussed small additions to HEP with patient. Patient is agreeable. Continue to progress as tolerated. Patient making fair progress towards all goals at this time. Pain most limiting factor. All goals remain applicable.      PLAN  yes Continue plan of care  [x]  Upgrade activities as tolerated  []  Discharge due to :  []  Other:    Shamar Flor PT    3/10/2023    7:41 AM    Future Appointments   Date Time Provider Lashay Brito   3/10/2023  3:00 PM Shamar Flor, PT [facility]   3/14/2023  2:30 PM Sophia Varghese PT [facility]   3/16/2023  2:30 PM Sophia Varghese, PT [facility]   3/20/2023  2:30 PM Shamar Flor, PT [facility]   3/22/2023 10:30 AM Marek Padilla APRN - CNP AMA BS AMB   3/23/2023  2:00 PM Sophia Varghese PT [facility]   3/28/2023  2:30 PM Sophia Varghese PT [facility]   3/30/2023  2:00 PM Sophia Varghese PT [facility]   4/3/2023  3:00 PM Sophia Varghese PT [facility]   4/6/2023  2:30 PM Sophia Varghese PT [facility]   4/10/2023  3:00 PM Sophia Varghese, PT [facility]   4/13/2023  2:00 PM Sophia Varghese, PT [facility]   4/18/2023 10:00 AM [facility] [address] [facility] BONE DENSITY RM 1 [id number]   8/4/2023 10:00 AM [address] [place] [address] [facility] BX RM 1 [facility]

## 2023-03-14 ENCOUNTER — HOSPITAL ENCOUNTER (OUTPATIENT)
Facility: HOSPITAL | Age: 63
Setting detail: RECURRING SERIES
Discharge: HOME | End: 2023-03-17
Payer: MEDICAID

## 2023-03-14 PROCEDURE — 97530 THERAPEUTIC ACTIVITIES: CPT

## 2023-03-14 PROCEDURE — 97110 THERAPEUTIC EXERCISES: CPT

## 2023-03-14 PROCEDURE — 97112 NEUROMUSCULAR REEDUCATION: CPT

## 2023-03-14 NOTE — PROGRESS NOTES
PHYSICAL / OCCUPATIONAL THERAPY - DAILY TREATMENT NOTE (updated )    Patient Name: Irma Sylvester    Date: 3/14/2023    : 1960  Insurance: Payor: Kuldeep Petit / Plan: Kuldeep Petit / Product Type: *No Product type* /      Patient  verified Yes     Visit #   Current / Total 4 12   Time   In / Out 2:28 2:59   Pain   In / Out 4 4   Subjective Functional Status/Changes: Notes she hasn't noticed any change. Reports some relief after leaving therapy but doesn't last because she goes home and uses the arm more. Hurts when she lifts a gallon of milk. Changes to:  Meds, Allergies, Med Hx, Sx Hx? If yes, update Summary List no       TREATMENT AREA =  Chronic left shoulder pain [M25.512, G89.29]    OBJECTIVE    Modalities Rationale:     decrease inflammation, decrease pain, and increase tissue extensibility to improve patient's ability to progress to PLOF and address remaining functional goals. min [] Estim Unattended, type/location:                                      []  w/ice    []  w/heat    min [] Estim Attended, type/location:                                     []  w/US     []  w/ice    []  w/heat    []  TENS insruct      min []  Mechanical Traction: type/lbs                   []  pro   []  sup   []  int   []  cont    []  before manual    []  after manual    min []  Ultrasound, settings/location:      min []  Iontophoresis w/ dexamethasone, location:                                               []  take home patch       []  in clinic   nd min  unbill [x]  Ice     []  Heat    location/position: (L) shoulder supine    min []  Paraffin,  details:     min []  Vasopneumatic Device, press/temp:     min []  Ha Bess / Kaylee Malloy:     If using vaso (only need to measure limb vaso being performed on)      pre-treatment girth :       post-treatment girth :       measured at (landmark location) :      min []  Other:    Skin assessment post-treatment (if applicable):    []  intact    []  redness- no adverse reaction                 []redness - adverse reaction:           Therapeutic Procedures: Tx Min Billable or 1:1 Min (if diff from Tx Min) Procedure, Rationale, Specifics   5  54685 Manual Therapy (timed):  decrease pain, increase ROM, and increase tissue extensibility to improve patient's ability to progress to PLOF and address remaining functional goals. The manual therapy interventions were performed at a separate and distinct time from the therapeutic activities interventions . (see flow sheet as applicable)     Details if applicable:  STM (L) pec and CFM along biceps     8  78760 Therapeutic Exercise (timed):  increase ROM, strength, coordination, balance, and proprioception to improve patient's ability to progress to PLOF and address remaining functional goals. (see flow sheet as applicable)     Details if applicable:     10  70664 Neuromuscular Re-Education (timed):  improve balance, coordination, kinesthetic sense, posture, core stability and proprioception to improve patient's ability to develop conscious control of individual muscles and awareness of position of extremities in order to progress to PLOF and address remaining functional goals. (see flow sheet as applicable)     Details if applicable:     8  51319 Therapeutic Activity (timed):  use of dynamic activities replicating functional movements to increase ROM, strength, coordination, balance, and proprioception in order to improve patient's ability to progress to PLOF and address remaining functional goals. (see flow sheet as applicable)     Details if applicable:       33303 Self Care/Home Management (timed):  improve patient knowledge and understanding of pain reducing techniques, positioning, and activity modification  to improve patient's ability to progress to PLOF and address remaining functional goals.   (see flow sheet as applicable)     Details if applicable:  education on use of ice   31  [facility] BC Totals Reminder: bill using total billable min of TIMED therapeutic procedures (example: do not include dry needle or estim unattended, both untimed codes, in totals to left)  8-22 min = 1 unit; 23-37 min = 2 units; 38-52 min = 3 units; 53-67 min = 4 units; 68-82 min = 5 units   Total Total     [x]  Patient Education billed concurrently with other procedures   [x] Review HEP    [] Progressed/Changed HEP, detail:    [] Other detail:       Objective Information/Functional Measures/Assessment    Added thoracic mobility and eccentric anterior strengthening. Pt with difficult time differentiating between muscle fatigue and pain. Coached in drills to stay out of pain and work in pain free range. Will continue to assess and progress as tolerated. Patient denied ice due to needing ot   for another appointment. Patient will continue to benefit from skilled PT / OT services to modify and progress therapeutic interventions, analyze and address functional mobility deficits, analyze and address ROM deficits, analyze and address strength deficits, analyze and address soft tissue restrictions, analyze and cue for proper movement patterns, and analyze and modify for postural abnormalities to address functional deficits and attain remaining goals. Progress toward goals / Updated goals:  []  See Progress Note/Recertification    Short Term Goals: To be accomplished in 3 weeks  Independent with HEP to progress to meet goals. Semi compliance 3/14/23  2. Pt to report decreased max pain levels less than 3/10 for improvement in ADLs. Long Term Goals: To be accomplished in 6 weeks  Improve FOTO score to 69/100 to show a significant functional change. 2. Pt to report 50% decrease in symptoms for improvement in QoL. 3. Pt to demonstrate L shoulder flexion to 140 and Fxn IR to L5 to assist with dressing.     PLAN  Yes  Continue plan of care  [x]  Upgrade activities as tolerated  []  Discharge due to :  []  Other:    Amadou Barlow, PT    3/14/2023 11:29 AM    Future Appointments   Date Time Provider Lashay Alisha   3/14/2023  2:30 PM Stalin Bowden, PT [facility]   3/16/2023  2:30 PM Stalin Bowden, PT [facility]   3/20/2023  2:30 PM Martha Moreau, PT [facility]   3/22/2023 10:30 AM Vasile Rodriguez, APRN - CNP AMA BS AMB   3/24/2023  1:00 PM Stalin Bowden, PT [facility]   3/28/2023  2:30 PM Stalin Bowden, PT [facility]   3/30/2023  2:00 PM Stalin Bowden, [facility]   4/3/2023  3:00 PM Stalin Bowden, PT [facility]   4/6/2023  2:30 PM Stalin Bowden, PT [facility]   4/10/2023  3:00 PM Stalin Bowden, PT [facility]   4/13/2023  2:00 PM Stalin Bowden, PT [address]   4/18/2023 10:00 AM [address] BONE DENSITY RM 1 [facility]   8/4/2023 10:00 AM [employer] [facility]ERE[facility] BX RM 1 [address]

## 2023-03-16 ENCOUNTER — HOSPITAL ENCOUNTER (OUTPATIENT)
Facility: HOSPITAL | Age: 63
Setting detail: RECURRING SERIES
Discharge: HOME | End: 2023-03-19
Payer: MEDICAID

## 2023-03-16 PROCEDURE — 97112 NEUROMUSCULAR REEDUCATION: CPT

## 2023-03-16 PROCEDURE — 97530 THERAPEUTIC ACTIVITIES: CPT

## 2023-03-16 PROCEDURE — 97110 THERAPEUTIC EXERCISES: CPT

## 2023-03-16 NOTE — PROGRESS NOTES
CNP AMA BS AMB   3/24/2023  1:00 PM Kurtz Moose,  [facility] [address]   3/28/2023  2:30 PM Kurtz Moose,  [facility] [address]   3/30/2023  2:00 PM Kurtz Moose,  [facility] [address]   4/3/2023  3:00 PM Kurtz Moose,  [facility] [address]   4/6/2023  2:30 PM Kurtz Moose,  [facility] [address]   4/10/2023  3:00 PM Kurtz Moose,  [facility] [address]   4/13/2023  2:00 PM Kurtz Moose, PT [address]   4/18/2023 10:00 AM [address] BONE DENSITY RM 1 [facility] [address]   8/4/2023 10:00 AM [address] [facility] BX RM 1 800 [facility]

## 2023-03-20 ENCOUNTER — HOSPITAL ENCOUNTER (OUTPATIENT)
Facility: HOSPITAL | Age: 63
Setting detail: RECURRING SERIES
Discharge: HOME | End: 2023-03-23
Payer: MEDICAID

## 2023-03-20 PROCEDURE — 97112 NEUROMUSCULAR REEDUCATION: CPT

## 2023-03-20 PROCEDURE — 97110 THERAPEUTIC EXERCISES: CPT

## 2023-03-20 NOTE — PROGRESS NOTES
PHYSICAL / OCCUPATIONAL THERAPY - DAILY TREATMENT NOTE (updated )    Patient Name: Wendy Galleog    Date: 3/20/2023    : 1960  Insurance: Payor: Babar Vega / Plan: Babar Vega / Product Type: *No Product type* /      Patient  verified yes     Visit #   Current / Total 6 13   Time   In / Out 2:30 pm 3:00 pm   Pain   In / Out 5 4   Subjective Functional Status/Changes: Patient reports feeling the same today. \"When is my shoulder going to feel better? \"      Changes to:  Meds, Allergies, Med Hx, Sx Hx? If yes, update Summary List no       TREATMENT AREA =  Chronic left shoulder pain [M25.512, G89.29]    OBJECTIVE    Therapeutic Procedures: Tx Min Billable or 1:1 Min (if diff from Tx Min) Procedure, Rationale, Specifics   15  67664 Therapeutic Exercise (timed):  increase ROM, strength, coordination, balance, and proprioception to improve patient's ability to progress to PLOF and address remaining functional goals. (see flow sheet as applicable)     Details if applicable:       15  08576 Neuromuscular Re-Education (timed):  improve balance, coordination, kinesthetic sense, posture, core stability and proprioception to improve patient's ability to develop conscious control of individual muscles and awareness of position of extremities in order to progress to PLOF and address remaining functional goals.  (see flow sheet as applicable)     Details if applicable:     27  [facility] BC Totals Reminder: bill using total billable min of TIMED therapeutic procedures (example: do not include dry needle or estim unattended, both untimed codes, in totals to left)  8-22 min = 1 unit; 23-37 min = 2 units; 38-52 min = 3 units; 53-67 min = 4 units; 68-82 min = 5 units   Total Total     [x]  Patient Education billed concurrently with other procedures   [x] Review HEP    [] Progressed/Changed HEP, detail:    [] Other detail:       Objective Information/Functional Measures/Assessment    Verbal or Tactile cues required:

## 2023-03-22 ENCOUNTER — OFFICE VISIT (OUTPATIENT)
Facility: CLINIC | Age: 63
End: 2023-03-22
Payer: MEDICAID

## 2023-03-22 VITALS
HEART RATE: 62 BPM | RESPIRATION RATE: 18 BRPM | OXYGEN SATURATION: 94 % | DIASTOLIC BLOOD PRESSURE: 84 MMHG | HEIGHT: 67 IN | BODY MASS INDEX: 24.01 KG/M2 | WEIGHT: 153 LBS | SYSTOLIC BLOOD PRESSURE: 150 MMHG | TEMPERATURE: 98.2 F

## 2023-03-22 DIAGNOSIS — G89.29 CHRONIC LEFT SHOULDER PAIN: ICD-10-CM

## 2023-03-22 DIAGNOSIS — R39.15 URINARY URGENCY: ICD-10-CM

## 2023-03-22 DIAGNOSIS — M25.512 CHRONIC LEFT SHOULDER PAIN: ICD-10-CM

## 2023-03-22 DIAGNOSIS — I10 ESSENTIAL HYPERTENSION: Primary | ICD-10-CM

## 2023-03-22 PROBLEM — Z96.642 STATUS POST TOTAL REPLACEMENT OF LEFT HIP: Status: ACTIVE | Noted: 2019-05-10

## 2023-03-22 PROBLEM — M06.9 RHEUMATOID ARTHRITIS INVOLVING MULTIPLE SITES (HCC): Status: ACTIVE | Noted: 2020-02-12

## 2023-03-22 PROBLEM — M34.9 LIMITED SCLERODERMA (HCC): Status: ACTIVE | Noted: 2021-05-18

## 2023-03-22 PROBLEM — J43.2 CENTRILOBULAR EMPHYSEMA (HCC): Status: ACTIVE | Noted: 2023-03-22

## 2023-03-22 PROCEDURE — 99214 OFFICE O/P EST MOD 30 MIN: CPT | Performed by: NURSE PRACTITIONER

## 2023-03-22 PROCEDURE — 3074F SYST BP LT 130 MM HG: CPT | Performed by: NURSE PRACTITIONER

## 2023-03-22 PROCEDURE — 3078F DIAST BP <80 MM HG: CPT | Performed by: NURSE PRACTITIONER

## 2023-03-22 ASSESSMENT — PATIENT HEALTH QUESTIONNAIRE - PHQ9
SUM OF ALL RESPONSES TO PHQ9 QUESTIONS 1 & 2: 0
SUM OF ALL RESPONSES TO PHQ QUESTIONS 1-9: 0
2. FEELING DOWN, DEPRESSED OR HOPELESS: 0
SUM OF ALL RESPONSES TO PHQ QUESTIONS 1-9: 0
1. LITTLE INTEREST OR PLEASURE IN DOING THINGS: 0

## 2023-03-22 NOTE — PATIENT INSTRUCTIONS
Remember to ice you shoulder as instructed in physical therapy. Try using the voltaren gel, per package instructions, on your shoulder for the pain.

## 2023-03-22 NOTE — PROGRESS NOTES
Room 7    When asked if patient has any concerns she would like to address with FATEMEH Porter patient states Yes I would like to have my urine tested due to Dr. Alberto Young told me my urine is off and I should have my urine checked. Patient states I [facility] . Patient states I am taking 6 pills of Tylenol a day for pain . Did patient bring someone? No     Did the patient have DME equipment? No     Did you take your medication today? Yes       1. \"Have you been to the ER, urgent care clinic since your last visit? Hospitalized since your last visit? \" No     2. \"Have you seen or consulted any other health care providers outside of the [address] [place] [address] since your last visit? \" No     3. For patients aged 39-70: Has the patient had a colonoscopy / FIT/ Cologuard? Yes      If the patient is female:    4. For patients aged 41-77: Has the patient had a mammogram within the past 2 years? Yes       5. For patients aged 21-65: Has the patient had a pap smear? {Cancer Care Gap present? Patient refused       PHQ-9  3/22/2023   Little interest or pleasure in doing things 0   Little interest or pleasure in doing things -   Feeling down, depressed, or hopeless 0   PHQ-2 Score 0   Total Score PHQ 2 -   PHQ-9 Total Score 0          Health Maintenance Due   Topic Date Due    HIV screen  Never done    DTaP/Tdap/Td vaccine (1 - Tdap) Never done    Cervical cancer screen  Never done    Shingles vaccine (1 of 2) Never done    Low dose CT lung screening  Never done    Flu vaccine (1) Never done         Who is the primary learner? Patient    What is the preferred language for health care of the primary learner? ENGLISH    How does the primary learner prefer to learn new concepts?  DEMONSTRATION    Answered By Patient    Relationship to Learner SELF
Dispense Refill    benzonatate (TESSALON) 200 MG capsule TAKE 1 CAPSULE BY MOUTH THREE TIMES DAILY AS NEEDED FOR COUGH 90 capsule 0    acetaminophen (TYLENOL) 500 MG tablet Take 500 mg by mouth every 6 hours as needed      amLODIPine (NORVASC) 2.5 MG tablet ceived the following from [facility]: Outside name: amLODIPine (NORVASC) 2.5 mg tablet      amLODIPine (NORVASC) 5 MG tablet Take 7.5 mg by mouth daily      atenolol (TENORMIN) 50 MG tablet Take 50 mg by mouth daily      atorvastatin (LIPITOR) 10 MG tablet Take 10 mg by mouth daily      celecoxib (CELEBREX) 100 MG capsule TK 1 C PO BID      esomeprazole (NEXIUM) 20 MG delayed release capsule Take by mouth      hydroxychloroquine (PLAQUENIL) 200 MG tablet Take 200 mg by mouth 2 times daily       No current facility-administered medications on file prior to visit. Allergies   Allergen Reactions    Cephalexin Rash       Objective:  BP (!) 150/84   Pulse 62   Temp 98.2 °F (36.8 °C) (Temporal)   Resp 18   Ht 5' 7\" (1.702 m)   Wt 153 lb (69.4 kg)   SpO2 94%   BMI 23.96 kg/m²  Body mass index is 23.96 kg/m². Physical assessment  Physical Exam            I have discussed the diagnosis with the patient and the intended plan as seen in the above orders. The patient has received an After-Visit Summary and questions were answered concerning future plans. An After Visit Summary was printed and given to the patient. All diagnosis have been discussed with the patient and all of the patient's questions have been answered. Sanjay Bowles, AGNP-BC  [address] [facility]   [address] [facility] [address]  [address]

## 2023-03-23 ENCOUNTER — APPOINTMENT (OUTPATIENT)
Facility: HOSPITAL | Age: 63
End: 2023-03-23
Payer: MEDICAID

## 2023-03-23 LAB
A/G RATIO: 1.9 RATIO (ref 1.1–2.6)
ALBUMIN SERPL-MCNC: 4.5 G/DL (ref 3.5–5)
ALP BLD-CCNC: 68 U/L (ref 40–120)
ALT SERPL-CCNC: 18 U/L (ref 5–40)
ANION GAP SERPL CALCULATED.3IONS-SCNC: 9 MMOL/L (ref 3–15)
AST SERPL-CCNC: 21 U/L (ref 10–37)
BACTERIA: PRESENT
BILIRUB SERPL-MCNC: 0.5 MG/DL (ref 0.2–1.2)
BILIRUB SERPL-MCNC: NEGATIVE MG/DL
BLOOD: NEGATIVE
BUN BLDV-MCNC: 12 MG/DL (ref 6–22)
CALCIUM SERPL-MCNC: 9.6 MG/DL (ref 8.4–10.5)
CHLORIDE BLD-SCNC: 102 MMOL/L (ref 98–110)
CLARITY: CLEAR
CO2: 27 MMOL/L (ref 20–32)
COLOR: YELLOW
CREAT SERPL-MCNC: 0.6 MG/DL (ref 0.8–1.4)
EPITHELIAL CELLS: ABNORMAL /HPF
GLOBULIN: 2.4 G/DL (ref 2–4)
GLOMERULAR FILTRATION RATE: >60 ML/MIN/1.73 SQ.M.
GLUCOSE: 83 MG/DL (ref 70–99)
GLUCOSE: NEGATIVE MG/DL
HYALINE CASTS: ABNORMAL /LPF (ref 0–2)
KETONES, URINE: NEGATIVE MG/DL
LEUKOCYTE ESTERASE, URINE: ABNORMAL
NITRITE, URINE: POSITIVE
PH, URINE: 5.5 PH (ref 5–8)
POTASSIUM SERPL-SCNC: 4.4 MMOL/L (ref 3.5–5.5)
PROTEIN UA: NEGATIVE MG/DL
RBC URINE: ABNORMAL /HPF
SODIUM BLD-SCNC: 138 MMOL/L (ref 133–145)
SPECIFIC GRAVITY: 1.01 (ref 1–1.03)
TOTAL PROTEIN: 6.9 G/DL (ref 6.2–8.1)
UROBILINOGEN: 0.2 MG/DL
WBC UA: ABNORMAL /HPF (ref 0–5)

## 2023-03-24 ENCOUNTER — HOSPITAL ENCOUNTER (OUTPATIENT)
Facility: HOSPITAL | Age: 63
Setting detail: RECURRING SERIES
Discharge: HOME | End: 2023-03-27
Payer: MEDICAID

## 2023-03-24 PROCEDURE — 97112 NEUROMUSCULAR REEDUCATION: CPT

## 2023-03-24 PROCEDURE — 97535 SELF CARE MNGMENT TRAINING: CPT

## 2023-03-24 PROCEDURE — 97110 THERAPEUTIC EXERCISES: CPT

## 2023-03-24 PROCEDURE — G0283 ELEC STIM OTHER THAN WOUND: HCPCS

## 2023-03-24 NOTE — PROGRESS NOTES
address strength deficits, analyze and address soft tissue restrictions, analyze and cue for proper movement patterns, and analyze and modify for postural abnormalities to address functional deficits and attain remaining goals.     Progress toward goals / Updated goals:  []  See Progress Note/Recertification    Messaged Nelson about imaging    PLAN  yes Continue plan of care  [x]  Upgrade activities as tolerated  []  Discharge due to :  []  Other:    Lisa Khan PT    3/24/2023    11:26 AM    Future Appointments   Date Time Provider Lashay Brito   3/24/2023  1:00 PM Lisa Khan, PT [facility]   3/28/2023  2:30 PM Lisa Khan, PT [facility]   3/30/2023  2:00 PM Lisa Khan, PT [facility]   4/3/2023  3:00 PM Lisa Khan, PT [facility]   4/6/2023  2:30 PM Lisa Khan, PT [facility]   4/10/2023  3:00 PM Lisa Khan, PT [facility]   4/13/2023  2:00 PM Lisa Khan, PT [facility]   4/18/2023 10:00 AM [facility] BONE DENSITY RM 1 [facility]   6/22/2023 10:30 AM NITA Morales - CNP AMA BS AMB   8/4/2023 10:00 AM [facility] STERLING STEREO BX RM 1 [address] [facility]

## 2023-03-28 ENCOUNTER — APPOINTMENT (OUTPATIENT)
Facility: HOSPITAL | Age: 63
End: 2023-03-28
Payer: MEDICAID

## 2023-03-30 ENCOUNTER — HOSPITAL ENCOUNTER (OUTPATIENT)
Facility: HOSPITAL | Age: 63
Setting detail: RECURRING SERIES
End: 2023-03-30
Payer: MEDICAID

## 2023-03-30 PROCEDURE — 97535 SELF CARE MNGMENT TRAINING: CPT

## 2023-03-30 PROCEDURE — G0283 ELEC STIM OTHER THAN WOUND: HCPCS

## 2023-03-30 NOTE — PROGRESS NOTES
Procedure, Rationale, Specifics   15  29205 Self Care/Home Management (timed):  improve patient knowledge and understanding of pain reducing techniques, posture/ergonomics, diagnosis/prognosis, and physical therapy expectations, procedures and progression  to improve patient's ability to progress to PLOF and address remaining functional goals. (see flow sheet as applicable)   Discussion about distal biceps tear, pop eye deformity, follow up with MD       86940 Therapeutic Exercise (timed):  increase ROM, strength, coordination, balance, and proprioception to improve patient's ability to progress to PLOF and address remaining functional goals. (see flow sheet as applicable)     Details if applicable:         21942 Neuromuscular Re-Education (timed):  improve balance, coordination, kinesthetic sense, posture, core stability and proprioception to improve patient's ability to develop conscious control of individual muscles and awareness of position of extremities in order to progress to PLOF and address remaining functional goals. (see flow sheet as applicable)     Details if applicable:     13  [facility] Totals Reminder: bill using total billable min of TIMED therapeutic procedures (example: do not include dry needle or estim unattended, both untimed codes, in totals to left)  8-22 min = 1 unit; 23-37 min = 2 units; 38-52 min = 3 units; 53-67 min = 4 units; 68-82 min = 5 units   Total Total     [x]  Patient Education billed concurrently with other procedures   [x] Review HEP    [] Progressed/Changed HEP, detail:    [] Other detail:       Objective Information/Functional Measures/Assessment    Observed pop eye deformity and mild bruising of the biceps. Spoke to member of Dr. Ishan Cuello team who got her an apt on Monday. Educated pt in potential of biceps tear. Attempted to calm pain with estim and ice.     Patient will continue to benefit from skilled PT / OT services to modify and progress therapeutic interventions, analyze

## 2023-03-31 ENCOUNTER — APPOINTMENT (OUTPATIENT)
Facility: HOSPITAL | Age: 63
End: 2023-03-31
Payer: MEDICAID

## 2023-04-03 ENCOUNTER — OFFICE VISIT (OUTPATIENT)
Facility: CLINIC | Age: 63
End: 2023-04-03
Payer: MEDICAID

## 2023-04-03 VITALS
RESPIRATION RATE: 18 BRPM | HEIGHT: 67 IN | BODY MASS INDEX: 24.01 KG/M2 | TEMPERATURE: 98.3 F | WEIGHT: 153 LBS | SYSTOLIC BLOOD PRESSURE: 138 MMHG | DIASTOLIC BLOOD PRESSURE: 66 MMHG | OXYGEN SATURATION: 96 % | HEART RATE: 60 BPM

## 2023-04-03 DIAGNOSIS — M25.512 CHRONIC LEFT SHOULDER PAIN: Primary | ICD-10-CM

## 2023-04-03 DIAGNOSIS — G89.29 CHRONIC LEFT SHOULDER PAIN: Primary | ICD-10-CM

## 2023-04-03 PROBLEM — I71.20 THORACIC AORTIC ANEURYSM WITHOUT RUPTURE (HCC): Status: ACTIVE | Noted: 2021-09-03

## 2023-04-03 PROCEDURE — 3074F SYST BP LT 130 MM HG: CPT | Performed by: NURSE PRACTITIONER

## 2023-04-03 PROCEDURE — 99214 OFFICE O/P EST MOD 30 MIN: CPT | Performed by: NURSE PRACTITIONER

## 2023-04-03 PROCEDURE — 3078F DIAST BP <80 MM HG: CPT | Performed by: NURSE PRACTITIONER

## 2023-04-03 RX ORDER — TRAMADOL HYDROCHLORIDE 50 MG/1
50 TABLET ORAL EVERY 4 HOURS PRN
Qty: 42 TABLET | Refills: 0 | Status: SHIPPED | OUTPATIENT
Start: 2023-04-03 | End: 2023-04-10

## 2023-04-03 ASSESSMENT — PATIENT HEALTH QUESTIONNAIRE - PHQ9
2. FEELING DOWN, DEPRESSED OR HOPELESS: 0
SUM OF ALL RESPONSES TO PHQ QUESTIONS 1-9: 0
SUM OF ALL RESPONSES TO PHQ9 QUESTIONS 1 & 2: 0
1. LITTLE INTEREST OR PLEASURE IN DOING THINGS: 0
SUM OF ALL RESPONSES TO PHQ QUESTIONS 1-9: 0

## 2023-04-03 NOTE — PROGRESS NOTES
Room 8     When asked if patient has any concerns she would like to address with FATEMEH Henson patient states yes I need a referral and X-ray due to the Physical Therapist I tore my Biocept in left arm . Did patient bring someone? No     Did the patient have DME equipment? No     Did you take your medication today? Yes       1. \"Have you been to the ER, urgent care clinic since your last visit? Hospitalized since your last visit? \" No     2. \"Have you seen or consulted any other health care providers outside of the [address] [place] [address] since your last visit? \" No     3. For patients aged 39-70: Has the patient had a colonoscopy / FIT/ Cologuard? Yes      If the patient is female:    4. For patients aged 41-77: Has the patient had a mammogram within the past 2 years? Yes       5. For patients aged 21-65: Has the patient had a pap smear? {Cancer Care Gap present? No \      PHQ-9  4/3/2023   Little interest or pleasure in doing things 0   Little interest or pleasure in doing things -   Feeling down, depressed, or hopeless 0   PHQ-2 Score 0   Total Score PHQ 2 -   PHQ-9 Total Score 0          Health Maintenance Due   Topic Date Due    Pneumococcal 0-64 years Vaccine (1 - PCV) Never done    HIV screen  Never done    DTaP/Tdap/Td vaccine (1 - Tdap) Never done    Cervical cancer screen  Never done    Shingles vaccine (1 of 2) Never done    Low dose CT lung screening  Never done         Who is the primary learner? Patient    What is the preferred language for health care of the primary learner? ENGLISH    How does the primary learner prefer to learn new concepts?  DEMONSTRATION    Answered By Patient    Relationship to Learner SELF
of DVT (deep vein thrombosis)    Cervical radiculopathy    Rheumatoid arthritis involving multiple sites (HCC)    Subluxation of distal end of left ulna    Limited scleroderma (HCC)    Chronic midline low back pain without sciatica    Centrilobular emphysema (HCC)    Status post total replacement of left hip       The PSH, FH were reviewed. SH:  Social History     Tobacco Use    Smoking status: Former     Packs/day: 1.00     Types: Cigarettes     Quit date: 2021     Years since quittin.5    Smokeless tobacco: Never   Vaping Use    Vaping Use: Never used   Substance Use Topics    Alcohol use: No    Drug use: No       Medications/Allergies:  Current Outpatient Medications on File Prior to Visit   Medication Sig Dispense Refill    benzonatate (TESSALON) 200 MG capsule TAKE 1 CAPSULE BY MOUTH THREE TIMES DAILY AS NEEDED FOR COUGH 90 capsule 0    acetaminophen (TYLENOL) 500 MG tablet Take 1 tablet by mouth every 6 hours as needed      amLODIPine (NORVASC) 2.5 MG tablet ceived the following from [facility]: Outside name: amLODIPine (NORVASC) 2.5 mg tablet      amLODIPine (NORVASC) 5 MG tablet Take 1.5 tablets by mouth daily      atenolol (TENORMIN) 50 MG tablet Take 1 tablet by mouth daily      atorvastatin (LIPITOR) 10 MG tablet Take 1 tablet by mouth daily      celecoxib (CELEBREX) 100 MG capsule TK 1 C PO BID      esomeprazole (NEXIUM) 20 MG delayed release capsule Take by mouth      hydroxychloroquine (PLAQUENIL) 200 MG tablet Take 1 tablet by mouth 2 times daily       No current facility-administered medications on file prior to visit. Allergies   Allergen Reactions    Cephalexin Rash       Objective:  /66 Comment: feet flat on floor  Pulse 60   Temp 98.3 °F (36.8 °C) (Temporal)   Resp 18   Ht 5' 7\" (1.702 m)   Wt 153 lb (69.4 kg)   SpO2 96%   BMI 23.96 kg/m²  Body mass index is 23.96 kg/m².     Physical assessment  Physical

## 2023-04-03 NOTE — PATIENT INSTRUCTIONS
Use ice as needed for 15-20 minutes at a time to help with the pain  Girish Owens  [address] [address]  [address]

## 2023-04-04 RX ORDER — BENZONATATE 200 MG/1
CAPSULE ORAL
Qty: 90 CAPSULE | Refills: 1 | Status: SHIPPED | OUTPATIENT
Start: 2023-04-04

## 2023-04-04 RX ORDER — ATORVASTATIN CALCIUM 10 MG/1
10 TABLET, FILM COATED ORAL DAILY
Qty: 90 TABLET | Refills: 1 | Status: SHIPPED | OUTPATIENT
Start: 2023-04-04

## 2023-04-18 ENCOUNTER — HOSPITAL ENCOUNTER (OUTPATIENT)
Facility: HOSPITAL | Age: 63
Discharge: HOME | End: 2023-04-21
Payer: MEDICAID

## 2023-04-18 DIAGNOSIS — M81.0 AGE-RELATED OSTEOPOROSIS WITHOUT CURRENT PATHOLOGICAL FRACTURE: ICD-10-CM

## 2023-04-18 PROCEDURE — 77080 DXA BONE DENSITY AXIAL: CPT

## 2023-05-30 ENCOUNTER — OFFICE VISIT (OUTPATIENT)
Facility: CLINIC | Age: 63
End: 2023-05-30
Payer: MEDICAID

## 2023-05-30 VITALS
BODY MASS INDEX: 24.14 KG/M2 | SYSTOLIC BLOOD PRESSURE: 156 MMHG | DIASTOLIC BLOOD PRESSURE: 65 MMHG | RESPIRATION RATE: 18 BRPM | HEART RATE: 62 BPM | TEMPERATURE: 98.2 F | OXYGEN SATURATION: 96 % | HEIGHT: 67 IN | WEIGHT: 153.8 LBS

## 2023-05-30 DIAGNOSIS — R22.9 MASS OF SKIN: Primary | ICD-10-CM

## 2023-05-30 PROCEDURE — 3074F SYST BP LT 130 MM HG: CPT | Performed by: NURSE PRACTITIONER

## 2023-05-30 PROCEDURE — 99213 OFFICE O/P EST LOW 20 MIN: CPT | Performed by: NURSE PRACTITIONER

## 2023-05-30 PROCEDURE — 3078F DIAST BP <80 MM HG: CPT | Performed by: NURSE PRACTITIONER

## 2023-05-30 ASSESSMENT — PATIENT HEALTH QUESTIONNAIRE - PHQ9
SUM OF ALL RESPONSES TO PHQ QUESTIONS 1-9: 0
1. LITTLE INTEREST OR PLEASURE IN DOING THINGS: 0
2. FEELING DOWN, DEPRESSED OR HOPELESS: 0
SUM OF ALL RESPONSES TO PHQ QUESTIONS 1-9: 0
SUM OF ALL RESPONSES TO PHQ9 QUESTIONS 1 & 2: 0
SUM OF ALL RESPONSES TO PHQ QUESTIONS 1-9: 0
SUM OF ALL RESPONSES TO PHQ QUESTIONS 1-9: 0

## 2023-05-30 NOTE — PROGRESS NOTES
Patient states the burn just pop up this morning when she was taking a bath. Aracelis Leonard presents today for   Chief Complaint   Patient presents with    Other     Burn on right upper leg,        Is someone accompanying this pt? No     Is the patient using any DME equipment during OV? No     Depression Screening:  No flowsheet data found. Learning Assessment:  No flowsheet data found. Fall Risk  No flowsheet data found. ADL  No flowsheet data found. Health Maintenance reviewed and discussed and ordered per Provider. Health Maintenance Due   Topic Date Due    Pneumococcal 0-64 years Vaccine (1 - PCV) Never done    HIV screen  Never done    DTaP/Tdap/Td vaccine (1 - Tdap) Never done    Cervical cancer screen  Never done    Shingles vaccine (1 of 2) Never done    Low dose CT lung screening  Never done   . Coordination of Care:  1. Have you been to the ER, urgent care clinic since your last visit? Hospitalized since your last visit? No     2. Have you seen or consulted any other health care providers outside of the [address] [place] [address] since your last visit? Include any pap smears or colon screening. No     3. For patients aged 39-70: Has the patient had a colonoscopy / FIT/ Cologuard? Yes       If the patient is female:    4. For patients aged 41-77: Has the patient had a mammogram within the past 2 years? Yes       5. For patients aged 21-65: Has the patient had a pap smear?  No

## 2023-05-30 NOTE — PROGRESS NOTES
[address]      Berenice Mata is a 60 y.o. female and presents with Other (Burn on right upper leg, )       Assessment/Plan:    1. Mass of skin   Most likely ruptured cyst  Advised to continue to monitor and call of there are worsening of symptoms  Patient verbalized understanding and is in agreement with this plan of care        Follow up and disposition:   Return for keep previously scheduled follow up. Subjective:    Labs obtained prior to visit? No  Reviewed with patient? N/A    Wound  Noticed it this morning  Was drying off and felt a buring sensation  When she looked in the mirror she saw a reddened lump with small opeing in the middle  Denies drainage  Endorses scant discomfort at this time      ROS:     Review of Systems  As stated in HPI, otherwise all others negative. The problem list was updated as a part of today's visit. Patient Active Problem List   Diagnosis    Ulnocarpal abutment syndrome, left    Degenerative joint disease (DJD) of hip    Thoracic aortic aneurysm without rupture ([address]    Other osteoporosis without current pathological fracture    Tobacco use    Anxiety    Essential hypertension    Raynaud's disease without gangrene    Chronic wrist pain, left    Cervical spondylosis with radiculopathy    Arthritis of carpometacarpal (CMC) joint of left thumb    History of DVT (deep vein thrombosis)    Cervical radiculopathy    Rheumatoid arthritis involving multiple sites ([address]    Subluxation of distal end of left ulna    Limited scleroderma (HCC)    Chronic midline low back pain without sciatica    Centrilobular emphysema ([address]    Status post total replacement of left hip       The PSH, FH were reviewed.       SH:  Social History     Tobacco Use    Smoking status: Former     Packs/day: 1.00     Types: Cigarettes     Quit date: 2021     Years since quittin.6    Smokeless tobacco: Never   Vaping Use

## 2023-06-05 RX ORDER — BENZONATATE 200 MG/1
CAPSULE ORAL
Qty: 90 CAPSULE | Refills: 1 | Status: SHIPPED | OUTPATIENT
Start: 2023-06-05

## 2023-06-06 RX ORDER — BENZONATATE 200 MG/1
CAPSULE ORAL
Qty: 90 CAPSULE | Refills: 1 | OUTPATIENT
Start: 2023-06-06

## 2023-06-22 ENCOUNTER — OFFICE VISIT (OUTPATIENT)
Facility: CLINIC | Age: 63
End: 2023-06-22
Payer: MEDICAID

## 2023-06-22 VITALS
DIASTOLIC BLOOD PRESSURE: 70 MMHG | RESPIRATION RATE: 18 BRPM | TEMPERATURE: 98.2 F | WEIGHT: 155 LBS | BODY MASS INDEX: 24.33 KG/M2 | HEART RATE: 56 BPM | SYSTOLIC BLOOD PRESSURE: 146 MMHG | OXYGEN SATURATION: 98 % | HEIGHT: 67 IN

## 2023-06-22 DIAGNOSIS — I10 ESSENTIAL HYPERTENSION: Primary | ICD-10-CM

## 2023-06-22 PROCEDURE — 3077F SYST BP >= 140 MM HG: CPT | Performed by: NURSE PRACTITIONER

## 2023-06-22 PROCEDURE — 3078F DIAST BP <80 MM HG: CPT | Performed by: NURSE PRACTITIONER

## 2023-06-22 PROCEDURE — 99214 OFFICE O/P EST MOD 30 MIN: CPT | Performed by: NURSE PRACTITIONER

## 2023-06-22 RX ORDER — ATENOLOL 100 MG/1
100 TABLET ORAL DAILY
Qty: 90 TABLET | Refills: 0 | Status: SHIPPED | OUTPATIENT
Start: 2023-06-22

## 2023-06-22 ASSESSMENT — PATIENT HEALTH QUESTIONNAIRE - PHQ9
SUM OF ALL RESPONSES TO PHQ9 QUESTIONS 1 & 2: 0
1. LITTLE INTEREST OR PLEASURE IN DOING THINGS: 0
SUM OF ALL RESPONSES TO PHQ QUESTIONS 1-9: 0
2. FEELING DOWN, DEPRESSED OR HOPELESS: 0
SUM OF ALL RESPONSES TO PHQ QUESTIONS 1-9: 0

## 2023-06-22 NOTE — PROGRESS NOTES
Room 7     When asked if patient has seen the PT patient states I have an appointment coming up . When asked if patient has any concerns he/she would like to address with FATEMEH Brian patient states yes, no . Did patient bring someone? NO     Did the patient have DME equipment? NO     Did you take your medication today? Yes       1. \"Have you been to the ER, urgent care clinic since your last visit? Hospitalized since your last visit? \" No     2. \"Have you seen or consulted any other health care providers outside of the [address] [place] [address] since your last visit? \" No     3. For patients aged 39-70: Has the patient had a colonoscopy / FIT/ Cologuard? Yes      If the patient is female:    4. For patients aged 41-77: Has the patient had a mammogram within the past 2 years? Yes       5. For patients aged 21-65: Has the patient had a pap smear? {Cancer Care Gap present?  Patient refused       PHQ-9  6/22/2023   Little interest or pleasure in doing things 0   Little interest or pleasure in doing things -   Feeling down, depressed, or hopeless 0   PHQ-2 Score 0   Total Score PHQ 2 -   PHQ-9 Total Score 0          Health Maintenance Due   Topic Date Due    Pneumococcal 0-64 years Vaccine (1 - PCV) Never done    HIV screen  Never done    DTaP/Tdap/Td vaccine (1 - Tdap) Never done    Cervical cancer screen  Never done    Shingles vaccine (1 of 2) Never done    Low dose CT lung screening &/or counseling  Never done    COVID-19 Vaccine (6 - Booster for Moderna series) 11/26/2022
osteoporosis without current pathological fracture    Tobacco use    Anxiety    Essential hypertension    Raynaud's disease without gangrene    Chronic wrist pain, left    Cervical spondylosis with radiculopathy    Arthritis of carpometacarpal (CMC) joint of left thumb    History of DVT (deep vein thrombosis)    Cervical radiculopathy    Rheumatoid arthritis involving multiple sites (HCC)    Subluxation of distal end of left ulna    Limited scleroderma (HCC)    Chronic midline low back pain without sciatica    Centrilobular emphysema ([address]    Status post total replacement of left hip       The PSH, FH were reviewed. SH:  Social History     Tobacco Use    Smoking status: Former     Packs/day: 1.00     Types: Cigarettes     Quit date: 2021     Years since quittin.7    Smokeless tobacco: Never   Vaping Use    Vaping Use: Never used   Substance Use Topics    Alcohol use: No    Drug use: No       Medications/Allergies:  Current Outpatient Medications on File Prior to Visit   Medication Sig Dispense Refill    benzonatate (TESSALON) 200 MG capsule TAKE 1 CAPSULE BY MOUTH THREE TIMES DAILY AS NEEDED FOR COUGH 90 capsule 1    atorvastatin (LIPITOR) 10 MG tablet TAKE 1 TABLET BY MOUTH DAILY 90 tablet 1    acetaminophen (TYLENOL) 500 MG tablet Take 1 tablet by mouth every 6 hours as needed      amLODIPine (NORVASC) 2.5 MG tablet ceived the following from [facility]: Outside name: amLODIPine (NORVASC) 2.5 mg tablet      amLODIPine (NORVASC) 5 MG tablet Take 1.5 tablets by mouth daily      atenolol (TENORMIN) 50 MG tablet Take 1 tablet by mouth daily      celecoxib (CELEBREX) 100 MG capsule TK 1 C PO BID      esomeprazole (NEXIUM) 20 MG delayed release capsule Take by mouth      hydroxychloroquine (PLAQUENIL) 200 MG tablet Take 1 tablet by mouth 2 times daily       No current facility-administered medications on file prior to visit.         Allergies   Allergen Reactions    Cephalexin Rash

## 2023-07-03 RX ORDER — ATENOLOL 50 MG/1
TABLET ORAL
Qty: 90 TABLET | OUTPATIENT
Start: 2023-07-03

## 2023-07-19 ENCOUNTER — HOSPITAL ENCOUNTER (OUTPATIENT)
Facility: HOSPITAL | Age: 63
Setting detail: RECURRING SERIES
Discharge: HOME | End: 2023-07-22
Payer: MEDICAID

## 2023-07-19 PROCEDURE — 97162 PT EVAL MOD COMPLEX 30 MIN: CPT

## 2023-07-19 NOTE — THERAPY EVALUATION
[facility] [facility] YA:[phone] Fx: [phone]  Plan of Care / Statement of Necessity for Physical Therapy Services     Patient Name: Jerelyn Severance : 1960   Medical   Diagnosis:  Left shoulder pain [M25.512] Treatment Diagnosis: M25.512  LEFT SHOULDER PAIN   Onset Date: 2023     Referral Source: Cain Thomas MD Start of Care [facility]): 2023   Prior Hospitalization: See medical history Provider #: [id number]   Prior Level of Function: Difficulty reaching since injury   Comorbidities: Rheumatoid arthritis involving multiple sites   Anxiety   Essential hypertension   History of DVT (deep vein thrombosis)   Raynaud's disease without gangrene   S/P cervical discectomy 2022 (doing okay. Stiff, no PT for it, had neck brace for 3 month)   Status post total replacement of left hip   Tobacco use   Rheumatoid arthritis involving left wrist   Arthritis of carpometacarpal (CMC) joint of left thumb   Subluxation of distal end of left ulna   Ulnocarpal abutment syndrome, left   Ganglion cyst of volar aspect of left wrist   Chronic wrist pain, left   Rheumatoid arthritis   Hypertension       Assessment / spring information:  Jerelyn Severance is a 60 y.o. female who presents to skilled PT for the treatment diagnosis of L shoulder pain. She has been at this location by Sterling Carrasquillo and Luis Soto for the same issue, but reports that there was an instance when she felt a pop in her arm followed with a lot of pain. Was diagnosed with a ruptured biceps tendon, GH OA, and RC tendinosis. Reports that lifting overhead is painful, putting on a seatbelt is painful, and also reaching behind her back is painful. Lifting something up below shoulder height also hurts. Sitting here, the arm throbs but not too painful. Pt takes a lot of tylenol for the shoulder pain.  Sleeping on the L shoulder is painful and tries to avoid sleeping on that

## 2023-07-19 NOTE — PROGRESS NOTES
PHYSICAL / OCCUPATIONAL THERAPY - DAILY TREATMENT NOTE (updated )  For Eval visit    Patient Name: Harish Remy    Date: 2023    : 1960  Insurance: Payor: Thanh Woodson / Plan: Thanh Woodson / Product Type: *No Product type* /      Patient  verified yes     Visit #   Current / Total 1 12   Time   In / Out 1257 140   Pain   In / Out 2/10 4/10   Subjective Functional Status/Changes: See POC     TREATMENT AREA =  Left shoulder pain [M25.512]    OBJECTIVE           36 min   Eval - untimed                      Therapeutic Procedures: Tx Min Billable or 1:1 Min (if diff from Tx Min) Procedure, Rationale, Specifics   7 (NC) 89069 Self Care/Home Management (timed):  improve patient knowledge and understanding of pain reducing techniques, positioning, posture/ergonomics, home safety, activity modification, diagnosis/prognosis, and physical therapy expectations, procedures and progression  to improve patient's ability to progress to PLOF and address remaining functional goals. (see flow sheet as applicable)     Details if applicable:    Reviewed diagnosis, prognosis, therapy progression   Reviewed and educated on HEP           Details if applicable:            Details if applicable:            Details if applicable:     7  [facility] Totals Reminder: bill using total billable min of TIMED therapeutic procedures (example: do not include dry needle or estim unattended, both untimed codes, in totals to left)  8-22 min = 1 unit; 23-37 min = 2 units; 38-52 min = 3 units; 53-67 min = 4 units; 68-82 min = 5 units   Total Total     [x]  Patient Education billed concurrently with other procedures   [x] Review HEP    [x] Progressed/Changed HEP, detail:    Access Code: [email or web address]  URL: [email or web address]  Date: 2023  Prepared by: Magdaline Gottron    Exercises  - Seated Scapular Retraction  - 1 x daily - 7 x weekly - 10-15 reps - 3\" hold  - Seated Shoulder Flexion Towel Slide at

## 2023-08-01 ENCOUNTER — HOSPITAL ENCOUNTER (OUTPATIENT)
Facility: HOSPITAL | Age: 63
Setting detail: RECURRING SERIES
Discharge: HOME | End: 2023-08-04
Payer: MEDICAID

## 2023-08-01 PROCEDURE — 97112 NEUROMUSCULAR REEDUCATION: CPT

## 2023-08-01 PROCEDURE — 97110 THERAPEUTIC EXERCISES: CPT

## 2023-08-01 PROCEDURE — 97535 SELF CARE MNGMENT TRAINING: CPT

## 2023-08-01 NOTE — PROGRESS NOTES
PHYSICAL / OCCUPATIONAL THERAPY - DAILY TREATMENT NOTE (updated )    Patient Name: Crispin Cea    Date: 2023    : 1960  Insurance: Payor: Yulia Dewey / Plan: Yulia Dewey / Product Type: *No Product type* /      Patient  verified Yes     Visit #   Current / Total 2 12   Time   In / Out 1220 110   Pain   In / Out 4/10 2/10   Subjective Functional Status/Changes: The exercises seem to be hurting it. Wondering if the pain will ever go away. TREATMENT AREA =  Left shoulder pain [M25.512]    OBJECTIVE    Modalities Rationale:     decrease inflammation and decrease pain to improve patient's ability to progress to PLOF and address remaining functional goals. 10 min [x] Estim Unattended, type/location:  Seated pre mod L shoulder                                    [x]  w/ice    []  w/heat    min [] Estim Attended, type/location:                                     []  w/US     []  w/ice    []  w/heat    []  TENS insruct      min []  Mechanical Traction: type/lbs                   []  pro   []  sup   []  int   []  cont    []  before manual    []  after manual    min []  Ultrasound, settings/location:      min []  Iontophoresis w/ dexamethasone, location:                                               []  take home patch       []  in clinic    min  unbill []  Ice     []  Heat    location/position:     min []  Paraffin,  details:     min []  Vasopneumatic Device, press/temp:     min []  Maryana Yanes / Eva Acre: If using vaso (only need to measure limb vaso being performed on)      pre-treatment girth :       post-treatment girth :       measured at (landmark location) :      min []  Other:    Skin assessment post-treatment (if applicable):    []  intact    []  redness- no adverse reaction                 []redness - adverse reaction:         Therapeutic Procedures:   Tx Min Billable or 1:1 Min (if diff from Tx Min) Procedure, Rationale, Specifics   20  37715 Therapeutic Exercise (timed):

## 2023-08-03 ENCOUNTER — HOSPITAL ENCOUNTER (OUTPATIENT)
Facility: HOSPITAL | Age: 63
Setting detail: RECURRING SERIES
Discharge: HOME | End: 2023-08-06
Payer: MEDICAID

## 2023-08-03 PROCEDURE — 97535 SELF CARE MNGMENT TRAINING: CPT

## 2023-08-03 PROCEDURE — 97110 THERAPEUTIC EXERCISES: CPT

## 2023-08-03 PROCEDURE — 97112 NEUROMUSCULAR REEDUCATION: CPT

## 2023-08-03 NOTE — PROGRESS NOTES
PHYSICAL / OCCUPATIONAL THERAPY - DAILY TREATMENT NOTE (updated )    Patient Name: oHward Willams    Date: 8/3/2023    : 1960  Insurance: Payor: Adam Atwood / Plan: Adam Atwood / Product Type: *No Product type* /      Patient  verified Yes     Visit #   Current / Total 3 12   Time   In / Out 1223 120   Pain   In / Out 4/10 4/10   Subjective Functional Status/Changes: The stim felt good for a few hours but then the pain came back. The pain is about the same. Hurts just sitting here. TREATMENT AREA =  Left shoulder pain [M25.512]    OBJECTIVE    Modalities Rationale:     decrease inflammation and decrease pain to improve patient's ability to progress to PLOF and address remaining functional goals. min [x] Estim Unattended, type/location:  Seated pre mod L shoulder                                    [x]  w/ice    []  w/heat    min [] Estim Attended, type/location:                                     []  w/US     []  w/ice    []  w/heat    []  TENS insruct      min []  Mechanical Traction: type/lbs                   []  pro   []  sup   []  int   []  cont    []  before manual    []  after manual    min []  Ultrasound, settings/location:      min []  Iontophoresis w/ dexamethasone, location:                                               []  take home patch       []  in clinic   10 min  unbill [x]  Ice     []  Heat    location/position: Seated L shoulder    min []  Paraffin,  details:     min []  Vasopneumatic Device, press/temp:     min []  Meredith Hey / Annamary Caden: If using vaso (only need to measure limb vaso being performed on)      pre-treatment girth :       post-treatment girth :       measured at (landmark location) :      min []  Other:    Skin assessment post-treatment (if applicable):    []  intact    []  redness- no adverse reaction                 []redness - adverse reaction:         Therapeutic Procedures:   Tx Min Billable or 1:1 Min (if diff from [employer]) Procedure, Rationale,

## 2023-08-04 ENCOUNTER — HOSPITAL ENCOUNTER (OUTPATIENT)
Dept: WOMENS IMAGING | Facility: HOSPITAL | Age: 63
End: 2023-08-04
Payer: MEDICAID

## 2023-08-04 DIAGNOSIS — Z12.31 SCREENING MAMMOGRAM FOR HIGH-RISK PATIENT: ICD-10-CM

## 2023-08-04 PROCEDURE — 77063 BREAST TOMOSYNTHESIS BI: CPT

## 2023-08-07 ENCOUNTER — HOSPITAL ENCOUNTER (OUTPATIENT)
Facility: HOSPITAL | Age: 63
Setting detail: RECURRING SERIES
Discharge: HOME | End: 2023-08-10
Payer: MEDICAID

## 2023-08-07 PROCEDURE — 97112 NEUROMUSCULAR REEDUCATION: CPT

## 2023-08-07 PROCEDURE — 97535 SELF CARE MNGMENT TRAINING: CPT

## 2023-08-07 PROCEDURE — G0283 ELEC STIM OTHER THAN WOUND: HCPCS

## 2023-08-07 PROCEDURE — 97110 THERAPEUTIC EXERCISES: CPT

## 2023-08-07 NOTE — PROGRESS NOTES
get to 150 deg with p! 8/3/23  Goal Met?  -  3) Pt will improve worst pain levels from initial evaluation level of 4/10 to 2/10 to show improved QOL and improved overall perception of pain-free/more pain-free function with ADLs. EVAL: worst pain 4/10  Current Status: - pain 4/10 constant 8/7/23  Goal Met?  -       All goals remain applicable at this time.      PLAN  Yes  Continue plan of care  [x]  Upgrade activities as tolerated  []  Discharge due to :  []  Other:    Elizabeth Lynch, PT    8/7/2023    11:04 AM    Future Appointments   Date Time Provider [address]   8/7/2023  1:40 PM Elizabeth Lynch, PT [facility]   8/10/2023  1:40 PM Bonnie Mueller, PT [facility]   8/15/2023  1:40 PM Elizabeth Lynch, PT [facility]   8/17/2023  1:40 PM Elizabeth Lynch, PT [facility]   9/28/2023  9:30 AM [facility] BS AMB   10/5/2023 11:00 AM NITA Zapata - CNP [facility] AMB   8/9/2024  9:15  [facility] [address]

## 2023-08-10 ENCOUNTER — HOSPITAL ENCOUNTER (OUTPATIENT)
Facility: HOSPITAL | Age: 63
Setting detail: RECURRING SERIES
Discharge: HOME | End: 2023-08-13
Payer: MEDICAID

## 2023-08-10 PROCEDURE — 97112 NEUROMUSCULAR REEDUCATION: CPT

## 2023-08-10 PROCEDURE — 97110 THERAPEUTIC EXERCISES: CPT

## 2023-08-10 NOTE — PROGRESS NOTES
proprioception to improve patient's ability to progress to PLOF and address remaining functional goals. (see flow sheet as applicable)     Details if applicable:       15  51967 Neuromuscular Re-Education (timed):  improve balance, coordination, kinesthetic sense, posture, core stability and proprioception to improve patient's ability to develop conscious control of individual muscles and awareness of position of extremities in order to progress to PLOF and address remaining functional goals. (see flow sheet as applicable)     Details if applicable:     27  [facility] Totals Reminder: bill using total billable min of TIMED therapeutic procedures (example: do not include dry needle or estim unattended, both untimed codes, in totals to left)  8-22 min = 1 unit; 23-37 min = 2 units; 38-52 min = 3 units; 53-67 min = 4 units; 68-82 min = 5 units   Total Total     [x]  Patient Education billed concurrently with other procedures   [x] Review HEP    [] Progressed/Changed HEP, detail:    [] Other detail:       Objective Information/Functional Measures/Assessment    Verbal or Tactile cues required: for all therex due to poor carryover. Tcs required for good form and body placement for isometrics   Posture/positioning: mild FHP/RS. ROM: WNL   Palpation: biceps tendon TTP, moderate in nature     Therex and NMR as per flow sheet. Patient will continue to benefit from skilled PT / OT services to modify and progress therapeutic interventions, analyze and address functional mobility deficits, analyze and address ROM deficits, analyze and address strength deficits, analyze and address soft tissue restrictions, analyze and cue for proper movement patterns, and analyze and modify for postural abnormalities to address functional deficits and attain remaining goals. Progress toward goals / Updated goals:  []  See Progress Note/Recertification    Patient tolerated today's treatment well.  Progressed/added therex as follows: no

## 2023-08-11 RX ORDER — METHOCARBAMOL 500 MG/1
500 TABLET, FILM COATED ORAL 4 TIMES DAILY
COMMUNITY
Start: 2023-07-17 | End: 2023-08-13 | Stop reason: SDUPTHER

## 2023-08-13 RX ORDER — METHOCARBAMOL 500 MG/1
500 TABLET, FILM COATED ORAL 4 TIMES DAILY
Qty: 90 TABLET | Refills: 1 | Status: SHIPPED | OUTPATIENT
Start: 2023-08-13

## 2023-08-15 ENCOUNTER — HOSPITAL ENCOUNTER (OUTPATIENT)
Facility: HOSPITAL | Age: 63
Setting detail: RECURRING SERIES
Discharge: HOME | End: 2023-08-18
Payer: MEDICAID

## 2023-08-15 PROCEDURE — 97110 THERAPEUTIC EXERCISES: CPT

## 2023-08-15 PROCEDURE — G0283 ELEC STIM OTHER THAN WOUND: HCPCS

## 2023-08-15 PROCEDURE — 97535 SELF CARE MNGMENT TRAINING: CPT

## 2023-08-15 PROCEDURE — 97112 NEUROMUSCULAR REEDUCATION: CPT

## 2023-08-15 RX ORDER — METHOCARBAMOL 500 MG/1
TABLET, FILM COATED ORAL
Qty: 90 TABLET | OUTPATIENT
Start: 2023-08-15

## 2023-08-15 NOTE — PROGRESS NOTES
PHYSICAL / OCCUPATIONAL THERAPY - DAILY TREATMENT NOTE (updated )    Patient Name: Mark South    Date: 8/15/2023    : 1960  Insurance: Payor: Vane Kirkpatrick / Plan: Vane Kirkpatrick / Product Type: *No Product type* /      Patient  verified Yes     Visit #   Current / Total 6 12   Time   In / Out 1:42 2:30   Pain   In / Out 2/10 1/10   Subjective Functional Status/Changes: The shoulder is doing much better     TREATMENT AREA =  Left shoulder pain [M25.512]    OBJECTIVE    Modalities Rationale:     decrease inflammation and decrease pain to improve patient's ability to progress to PLOF and address remaining functional goals. 10 min [x] Estim Unattended, type/location:  Seated pre mod L shoulder                                    [x]  w/ice    []  w/heat    min [] Estim Attended, type/location:                                     []  w/US     []  w/ice    []  w/heat    []  TENS insruct      min []  Mechanical Traction: type/lbs                   []  pro   []  sup   []  int   []  cont    []  before manual    []  after manual    min []  Ultrasound, settings/location:      min []  Iontophoresis w/ dexamethasone, location:                                               []  take home patch       []  in clinic    min  unbill [x]  Ice     []  Heat    location/position: Seated L shoulder    min []  Paraffin,  details:     min []  Vasopneumatic Device, press/temp:     min []  Karishma Car / Chelsi Lasso: If using vaso (only need to measure limb vaso being performed on)      pre-treatment girth :       post-treatment girth :       measured at (landmark location) :      min []  Other:    Skin assessment post-treatment (if applicable):    []  intact    []  redness- no adverse reaction                 []redness - adverse reaction:         Therapeutic Procedures:   Tx Min Billable or 1:1 Min (if diff from Tx Min) Procedure, Rationale, Specifics   15  44079 Therapeutic Exercise (timed):  increase ROM, strength,

## 2023-08-17 ENCOUNTER — HOSPITAL ENCOUNTER (OUTPATIENT)
Facility: HOSPITAL | Age: 63
Setting detail: RECURRING SERIES
Discharge: HOME | End: 2023-08-20
Payer: MEDICAID

## 2023-08-17 PROCEDURE — 97535 SELF CARE MNGMENT TRAINING: CPT

## 2023-08-17 PROCEDURE — 97112 NEUROMUSCULAR REEDUCATION: CPT

## 2023-08-17 PROCEDURE — 97110 THERAPEUTIC EXERCISES: CPT

## 2023-08-17 NOTE — PROGRESS NOTES
PHYSICAL / OCCUPATIONAL THERAPY - DAILY TREATMENT NOTE (updated )    Patient Name: Fabian Evans    Date: 2023    : 1960  Insurance: Payor: William Strauss / Plan: William Strauss / Product Type: *No Product type* /      Patient  verified Yes     Visit #   Current / Total 7 12   Time   In / Out 1:45 2:25   Pain   In / Out 2.5/10 210   Subjective Functional Status/Changes: See PN     TREATMENT AREA =  Left shoulder pain [M25.512]    OBJECTIVE    Modalities Rationale:     decrease inflammation and decrease pain to improve patient's ability to progress to PLOF and address remaining functional goals. PD min [x] Estim Unattended, type/location:  Seated pre mod L shoulder                                    [x]  w/ice    []  w/heat    min [] Estim Attended, type/location:                                     []  w/US     []  w/ice    []  w/heat    []  TENS insruct      min []  Mechanical Traction: type/lbs                   []  pro   []  sup   []  int   []  cont    []  before manual    []  after manual    min []  Ultrasound, settings/location:      min []  Iontophoresis w/ dexamethasone, location:                                               []  take home patch       []  in clinic    min  unbill [x]  Ice     []  Heat    location/position: Seated L shoulder    min []  Paraffin,  details:     min []  Vasopneumatic Device, press/temp:     min []  Meche Stover / Gus Do: If using vaso (only need to measure limb vaso being performed on)      pre-treatment girth :       post-treatment girth :       measured at (landmark location) :      min []  Other:    Skin assessment post-treatment (if applicable):    []  intact    []  redness- no adverse reaction                 []redness - adverse reaction:         Therapeutic Procedures:   Tx Min Billable or 1:1 Min (if diff from Tx Min) Procedure, Rationale, Specifics   13  41457 Therapeutic Exercise (timed):  increase ROM, strength, coordination, balance, and

## 2023-08-17 NOTE — THERAPY RECERTIFICATION
improve L shoulder ROM to ABD >140 deg without pain      EVAL:  deg with pain   Current Status: -  deg p! Goal Met?  - Met for ROM but with pain     3) Pt will improve worst pain levels from initial evaluation level of 4/10 to 2/10 to show improved QOL and improved overall perception of pain-free/more pain-free function with ADLs. EVAL: worst pain 4/10  Current Status: - worst pain 3/10  Goal Met?  - Progressing      New Goals to be achieved in __4__ weeks  Pt MET FOTO for long term goals: goal of 61 and today was 72     NEW GOALS     1) Pt will show improved MMT without pain incr >4/5 for all planes   EVAL:   Shoulder MMT   L flexion NT         R flexion NT  L ABD 3+/4 p!    R ABD 4+/5  L ER 4/5 p!    R ER 4+/5  L IR 4/5 p!     R IR 4+/5   Current Status: -  Goal Met?  -    2) Pt will be able to perform reaching overhead with <2lb with </= 3/10 pain (within tolerable limits) signifying improvement in overall functional capacity and activity tolerance. EVAL: scared to lift overhead  Current Status: -  Goal Met?  -    3) Pt will improve L shoulder AROM to flexion/ABD >150 deg and FIR >T8 with minor incr in pain  to order to improve ability to reach better actively in most planes    EVAL:   Shoulder AROM   L flexion 132 deg p! R flexion 150 deg   L  deg p!    R  deg   L Ext 25 deg p!    R Ext 50 deg   L Hor ADD -    R Hor ADD -  Functional ER - (L) C5 p! (R) C6  Functional IR - (L) T10 p!    (R) T7  Current Status: -  Goal Met?  -       RECOMMENDATIONS  Continue with therapy 1-2x/week for 4 weeks to further improve upon L shoulder  ROM, strength, stability, balance and functional activities before transitioning to DC with HEP. Please advise. Thank you.     Payor: Leonel Boo / Plan: Leonel Boo / Product Type: *No Product type* /

## 2023-08-22 ENCOUNTER — TELEPHONE (OUTPATIENT)
Facility: CLINIC | Age: 63
End: 2023-08-22

## 2023-08-22 DIAGNOSIS — B35.1 ONYCHOMYCOSIS: Primary | ICD-10-CM

## 2023-08-22 NOTE — TELEPHONE ENCOUNTER
----- Message from Guera Mcclendon sent at 8/15/2023 10:37 AM EDT -----  Subject: Referral Request    Reason for referral request? Patient is requesting a referral to see a   doctor for the fungus on her nail that has been there a month and its not   gettng any better. WOuld like to see if there is a fungus specialist she   can see. Provider patient wants to be referred to(if known):     Provider Phone Number(if known):     Additional Information for Provider?   ---------------------------------------------------------------------------  --------------  Maddie [place] Bartolome    [phone]; OK to leave message on voicemail  ---------------------------------------------------------------------------  --------------

## 2023-08-22 NOTE — TELEPHONE ENCOUNTER
Returned call. States she has a fungus on the index finger of her left hand. She went to ED and was given an x-ray, states she has some pain and discomfort. In the ED states she noted discoloration of the nail slowly worsening since July. States the nail is falling off. Will place referral to derm for further evaluation.   Patient verbalized understanding and is in agreement with this plan of care

## 2023-08-29 ENCOUNTER — HOSPITAL ENCOUNTER (OUTPATIENT)
Facility: HOSPITAL | Age: 63
Setting detail: RECURRING SERIES
Discharge: HOME | End: 2023-09-01
Payer: MEDICAID

## 2023-08-29 PROCEDURE — G0283 ELEC STIM OTHER THAN WOUND: HCPCS

## 2023-08-29 PROCEDURE — 97530 THERAPEUTIC ACTIVITIES: CPT

## 2023-08-29 PROCEDURE — 97112 NEUROMUSCULAR REEDUCATION: CPT

## 2023-08-29 PROCEDURE — 97110 THERAPEUTIC EXERCISES: CPT

## 2023-08-29 NOTE — PROGRESS NOTES
PHYSICAL / OCCUPATIONAL THERAPY - DAILY TREATMENT NOTE (updated )    Patient Name: Kaur Alvarez    Date: 2023    : 1960  Insurance: Payor: Robert Terrazas / Plan: Robert Terrazas / Product Type: *No Product type* /      Patient  verified Yes     Visit #   Current / Total 8 12   Time   In / Out 1:50 2:40   Pain   In / Out 2.5/10 1.510   Subjective Functional Status/Changes: The shoulder has been bothering her. It was feeling good before she left for vacation. TREATMENT AREA =  Left shoulder pain [M25.512]    OBJECTIVE    Modalities Rationale:     decrease inflammation and decrease pain to improve patient's ability to progress to PLOF and address remaining functional goals. 10 min [x] Estim Unattended, type/location:  Seated pre mod L shoulder                                    [x]  w/ice    []  w/heat    min [] Estim Attended, type/location:                                     []  w/US     []  w/ice    []  w/heat    []  TENS insruct      min []  Mechanical Traction: type/lbs                   []  pro   []  sup   []  int   []  cont    []  before manual    []  after manual    min []  Ultrasound, settings/location:      min []  Iontophoresis w/ dexamethasone, location:                                               []  take home patch       []  in clinic    min  unbill [x]  Ice     []  Heat    location/position: Seated L shoulder    min []  Paraffin,  details:     min []  Vasopneumatic Device, press/temp:     min []  Eldonna Flight / Reola Tomas: If using vaso (only need to measure limb vaso being performed on)      pre-treatment girth :       post-treatment girth :       measured at (landmark location) :      min []  Other:    Skin assessment post-treatment (if applicable):    []  intact    []  redness- no adverse reaction                 []redness - adverse reaction:         Therapeutic Procedures:   Tx Min Billable or 1:1 Min (if diff from Tx Min) Procedure, Rationale, Specifics   13  [id number]

## 2023-08-31 ENCOUNTER — HOSPITAL ENCOUNTER (OUTPATIENT)
Facility: HOSPITAL | Age: 63
Setting detail: RECURRING SERIES
End: 2023-08-31
Payer: MEDICAID

## 2023-09-05 ENCOUNTER — HOSPITAL ENCOUNTER (OUTPATIENT)
Facility: HOSPITAL | Age: 63
Setting detail: RECURRING SERIES
Discharge: HOME | End: 2023-09-08
Payer: MEDICAID

## 2023-09-05 PROCEDURE — 97535 SELF CARE MNGMENT TRAINING: CPT

## 2023-09-05 PROCEDURE — 97112 NEUROMUSCULAR REEDUCATION: CPT

## 2023-09-05 PROCEDURE — 97110 THERAPEUTIC EXERCISES: CPT

## 2023-09-05 NOTE — PROGRESS NOTES
remaining functional goals. (see flow sheet as applicable)     Details if applicable:    Edu on nature of arthritis and RC tears and tissue healing times and activity modification  Edu on continuation of POC based on functional progress with PT  Educated on pendulum due to cont pain with weight for shoulder traction load      49509 Therapeutic Activity (timed):  use of dynamic activities replicating functional movements to increase ROM, strength, coordination, balance, and proprioception in order to improve patient's ability to progress to PLOF and address remaining functional goals. (see flow sheet as applicable)       Details if applicable:     43  [facility] Totals Reminder: bill using total billable min of TIMED therapeutic procedures (example: do not include dry needle or estim unattended, both untimed codes, in totals to left)  8-22 min = 1 unit; 23-37 min = 2 units; 38-52 min = 3 units; 53-67 min = 4 units; 68-82 min = 5 units   Total Total     [x]  Patient Education billed concurrently with other procedures   [x] Review HEP    [] Progressed/Changed HEP, detail:    [] Other detail:       Objective Information/Functional Measures/Assessment    Had pain with overhead reaching today. Tried taping to see if that would help. Pt reported decr pain post taping. Edu on taping instruction and management. Spent a good amount of time in discussion about continuance of PT, going to f/u with doctor due to cont pain, and also how progressions in PT work based on pain.      Patient will continue to benefit from skilled PT / OT services to modify and progress therapeutic interventions, analyze and address functional mobility deficits, analyze and address ROM deficits, analyze and address strength deficits, analyze and address soft tissue restrictions, analyze and cue for proper movement patterns, analyze and modify for postural abnormalities, and instruct in home and community integration to address functional deficits and

## 2023-09-07 ENCOUNTER — HOSPITAL ENCOUNTER (OUTPATIENT)
Facility: HOSPITAL | Age: 63
Setting detail: RECURRING SERIES
Discharge: HOME | End: 2023-09-10
Payer: MEDICAID

## 2023-09-07 PROCEDURE — 97110 THERAPEUTIC EXERCISES: CPT

## 2023-09-07 PROCEDURE — 97112 NEUROMUSCULAR REEDUCATION: CPT

## 2023-09-07 PROCEDURE — 97535 SELF CARE MNGMENT TRAINING: CPT

## 2023-09-07 NOTE — PROGRESS NOTES
RC tears and tissue healing times and activity modification  Edu on continuation of POC based on functional progress with PT  Educated on pendulum due to cont pain with weight for shoulder traction load      47257 Therapeutic Activity (timed):  use of dynamic activities replicating functional movements to increase ROM, strength, coordination, balance, and proprioception in order to improve patient's ability to progress to PLOF and address remaining functional goals. (see flow sheet as applicable)       Details if applicable:     36  [facility] Totals Reminder: bill using total billable min of TIMED therapeutic procedures (example: do not include dry needle or estim unattended, both untimed codes, in totals to left)  8-22 min = 1 unit; 23-37 min = 2 units; 38-52 min = 3 units; 53-67 min = 4 units; 68-82 min = 5 units   Total Total     [x]  Patient Education billed concurrently with other procedures   [x] Review HEP    [] Progressed/Changed HEP, detail:    [] Other detail:       Objective Information/Functional Measures/Assessment    L shoulder AROM    deg   Flexion  135 deg p! Overall pt has made some progress with the PT but is not satisfied with the cont pain she is having. Suggested she go back to referring provider to f/u for possible medication adjustment or possible injection to help reduce her overall pain levels. Will cont with PT as is still seems to be helping    Patient will continue to benefit from skilled PT / OT services to modify and progress therapeutic interventions, analyze and address functional mobility deficits, analyze and address ROM deficits, analyze and address strength deficits, analyze and address soft tissue restrictions, analyze and cue for proper movement patterns, analyze and modify for postural abnormalities, and instruct in home and community integration to address functional deficits and attain remaining goals.     Progress toward goals / Updated goals:  []  See Progress

## 2023-09-12 ENCOUNTER — HOSPITAL ENCOUNTER (OUTPATIENT)
Facility: HOSPITAL | Age: 63
Setting detail: RECURRING SERIES
Discharge: HOME | End: 2023-09-15
Payer: MEDICAID

## 2023-09-12 PROCEDURE — 97535 SELF CARE MNGMENT TRAINING: CPT

## 2023-09-12 PROCEDURE — 97110 THERAPEUTIC EXERCISES: CPT

## 2023-09-12 PROCEDURE — 97112 NEUROMUSCULAR REEDUCATION: CPT

## 2023-09-12 NOTE — PROGRESS NOTES
PHYSICAL / OCCUPATIONAL THERAPY - DAILY TREATMENT NOTE (updated )    Patient Name: Saleem Kaur    Date: 2023    : 1960  Insurance: Payor: Leela Matson / Plan: Leela Matson / Product Type: *No Product type* /      Patient  verified Yes     Visit #   Current / Total 11 16   Time   In / Out 12:25 1:03   Pain   In / Out 2/10 2/10   Subjective Functional Status/Changes: Going to see the doctor on Friday about possible injection. Still hurting but today is a better day     TREATMENT AREA =  Left shoulder pain [M25.512]    OBJECTIVE    Modalities Rationale:     decrease inflammation and decrease pain to improve patient's ability to progress to PLOF and address remaining functional goals. ND min [x] Estim Unattended, type/location:  Seated pre mod L shoulder                                    [x]  w/ice    []  w/heat    min [] Estim Attended, type/location:                                     []  w/US     []  w/ice    []  w/heat    []  TENS insruct      min []  Mechanical Traction: type/lbs                   []  pro   []  sup   []  int   []  cont    []  before manual    []  after manual    min []  Ultrasound, settings/location:      min []  Iontophoresis w/ dexamethasone, location:                                               []  take home patch       []  in clinic    min  unbill [x]  Ice     []  Heat    location/position: Seated L shoulder    min []  Paraffin,  details:     min []  Vasopneumatic Device, press/temp:     min []  Cory Tadeo / Levern Boss: If using vaso (only need to measure limb vaso being performed on)      pre-treatment girth :       post-treatment girth :       measured at (landmark location) :      min []  Other:    Skin assessment post-treatment (if applicable):    []  intact    []  redness- no adverse reaction                 []redness - adverse reaction:         Therapeutic Procedures:   Tx Min Billable or 1:1 Min (if diff from [employer]) Procedure, Rationale, Specifics   10

## 2023-09-14 ENCOUNTER — HOSPITAL ENCOUNTER (OUTPATIENT)
Facility: HOSPITAL | Age: 63
Setting detail: RECURRING SERIES
Discharge: HOME | End: 2023-09-17
Payer: MEDICAID

## 2023-09-14 PROCEDURE — 97530 THERAPEUTIC ACTIVITIES: CPT

## 2023-09-14 PROCEDURE — 97110 THERAPEUTIC EXERCISES: CPT

## 2023-09-14 PROCEDURE — 97112 NEUROMUSCULAR REEDUCATION: CPT

## 2023-09-14 NOTE — THERAPY RECERTIFICATION
[facility] THERAPY  [address], [address] [address],[facility] - Ph: [phone]  Fx: [phone]  PHYSICAL THERAPY PROGRESS NOTE  Patient Name: Adolph Harry : 1960   Treatment/Medical Diagnosis: Left shoulder pain [M25.512]   Referral Source: Elena Rosario MD     Date of Initial Visit: 23 Attended Visits: 12 Missed Visits: 0     SUMMARY OF TREATMENT  Pt has been evaluated/assessed and participated in skilled therapeutic exercise, functional activity training,?neuromuscular facilitation and coordination training, patient education,? modalities including ice with pre mod?and instruction on HEP in order to improve upon L shoulder ROM, strength, decreased pain, and return to PLOF. SUBJECTIVE: the shoulder is sore today but mostly feeling about the same. Sees the doctor tomorrow about possible injection since the pain is not all the way betters    CURRENT STATUS  PN being done today since pt seeing the referring provider tomorrow 9/15/23. Pt has made fair to good progress in PT. Max pain level at 3-4/10, average pain level at 2/10, least pain level at 1-2/10. Pt report she still has pain with most activities in the ACJ area and also top of the shoulder. No sig pain along the biceps region but some TTP in the bicipital groove. Able to reach [place] to about 145 deg before having some above 90 deg. Still limited reaching behind back and behind her head with some improvement since starting PT. She did meet her FOTO goal last PN  showing some functional progress. Pt does help to an extant but still having this pain. Due to chronic nature of pain, would suggest pt try an injection to see if that calms down her pain so she can tolerate more in PT. Pt notes having been performing the HEP as prescribed. Pt will continue to benefit from skilled PT to progress exercises and improve upon?functional activities.     1) Pt will show improved MMT without

## 2023-09-14 NOTE — PROGRESS NOTES
PHYSICAL / OCCUPATIONAL THERAPY - DAILY TREATMENT NOTE (updated )    Patient Name: Thom Rico    Date: 2023    : 1960  Insurance: Payor: Fernando Mendez / Plan: Fernando Mendez / Product Type: *No Product type* /      Patient  verified Yes     Visit #   Current / Total 12 16   Time   In / Out 1:43 2:23   Pain   In / Out 2/10 1/10   Subjective Functional Status/Changes: The shoulder is sore and about the same     TREATMENT AREA =  Left shoulder pain [M25.512]    OBJECTIVE    Modalities Rationale:     decrease inflammation and decrease pain to improve patient's ability to progress to PLOF and address remaining functional goals. ND min [x] Estim Unattended, type/location:  Seated pre mod L shoulder                                    [x]  w/ice    []  w/heat    min [] Estim Attended, type/location:                                     []  w/US     []  w/ice    []  w/heat    []  TENS insruct      min []  Mechanical Traction: type/lbs                   []  pro   []  sup   []  int   []  cont    []  before manual    []  after manual    min []  Ultrasound, settings/location:      min []  Iontophoresis w/ dexamethasone, location:                                               []  take home patch       []  in clinic    min  unbill [x]  Ice     []  Heat    location/position: Seated L shoulder    min []  Paraffin,  details:     min []  Vasopneumatic Device, press/temp:     min []  Arleene Coats / Harlene : If using vaso (only need to measure limb vaso being performed on)      pre-treatment girth :       post-treatment girth :       measured at (landmark location) :      min []  Other:    Skin assessment post-treatment (if applicable):    []  intact    []  redness- no adverse reaction                 []redness - adverse reaction:         Therapeutic Procedures:   Tx Min Billable or 1:1 Min (if diff from Tx Min) Procedure, Rationale, Specifics   15  16981 Therapeutic Exercise (timed):  increase ROM, strength,

## 2023-09-15 DIAGNOSIS — I10 ESSENTIAL HYPERTENSION: ICD-10-CM

## 2023-09-17 RX ORDER — ATENOLOL 100 MG/1
100 TABLET ORAL DAILY
Qty: 90 TABLET | Refills: 1 | Status: SHIPPED | OUTPATIENT
Start: 2023-09-17

## 2023-09-19 ENCOUNTER — APPOINTMENT (OUTPATIENT)
Facility: HOSPITAL | Age: 63
End: 2023-09-19
Payer: MEDICAID

## 2023-09-21 ENCOUNTER — APPOINTMENT (OUTPATIENT)
Facility: HOSPITAL | Age: 63
End: 2023-09-21
Payer: MEDICAID

## 2023-09-26 RX ORDER — ATORVASTATIN CALCIUM 10 MG/1
10 TABLET, FILM COATED ORAL DAILY
Qty: 90 TABLET | Refills: 1 | Status: SHIPPED | OUTPATIENT
Start: 2023-09-26

## 2023-09-28 ENCOUNTER — HOSPITAL ENCOUNTER (OUTPATIENT)
Facility: HOSPITAL | Age: 63
Setting detail: RECURRING SERIES
End: 2023-09-28
Payer: MEDICAID

## 2023-10-05 ENCOUNTER — OFFICE VISIT (OUTPATIENT)
Facility: CLINIC | Age: 63
End: 2023-10-05
Payer: MEDICAID

## 2023-10-05 VITALS
OXYGEN SATURATION: 99 % | HEIGHT: 67 IN | BODY MASS INDEX: 24.01 KG/M2 | SYSTOLIC BLOOD PRESSURE: 128 MMHG | WEIGHT: 153 LBS | HEART RATE: 56 BPM | RESPIRATION RATE: 18 BRPM | DIASTOLIC BLOOD PRESSURE: 60 MMHG | TEMPERATURE: 98.2 F

## 2023-10-05 DIAGNOSIS — Z12.11 COLON CANCER SCREENING: ICD-10-CM

## 2023-10-05 DIAGNOSIS — I10 ESSENTIAL HYPERTENSION: Primary | ICD-10-CM

## 2023-10-05 PROCEDURE — 3074F SYST BP LT 130 MM HG: CPT | Performed by: NURSE PRACTITIONER

## 2023-10-05 PROCEDURE — 3078F DIAST BP <80 MM HG: CPT | Performed by: NURSE PRACTITIONER

## 2023-10-05 PROCEDURE — 99213 OFFICE O/P EST LOW 20 MIN: CPT | Performed by: NURSE PRACTITIONER

## 2023-10-05 ASSESSMENT — PATIENT HEALTH QUESTIONNAIRE - PHQ9
SUM OF ALL RESPONSES TO PHQ QUESTIONS 1-9: 0
SUM OF ALL RESPONSES TO PHQ9 QUESTIONS 1 & 2: 0
1. LITTLE INTEREST OR PLEASURE IN DOING THINGS: 0
2. FEELING DOWN, DEPRESSED OR HOPELESS: 0
SUM OF ALL RESPONSES TO PHQ QUESTIONS 1-9: 0

## 2023-10-05 NOTE — PROGRESS NOTES
Room 25 [facility] had concerns including Follow-up Chronic Condition. for today's visit . When asked if patient has any concerns she would like to address with FATEMEH Riggs . Patient states I would like to discuss my pointer finger on my left hand. FATEMEH Riggs has been notified  of patient concerns . Did patient bring someone? No     Did the patient have DME equipment? Did you take your medication today? 1. \"Have you been to the ER, urgent care clinic since your last visit? Hospitalized since your last visit? \" . NO    2. \"Have you seen or consulted any other health care providers outside of the [address] [place] [address] since your last visit? \" No     3. For patients aged 43-73: Has the patient had a colonoscopy / FIT/ Cologuard? Yes      If the patient is female:    4. For patients aged 43-66: Has the patient had a mammogram within the past 2 years? yes      5. For patients aged 21-65: Has the patient had a pap smear? {Cancer Care Gap present? When asked if patient menstrual cycle is normal patient states yes, no .        3/10/2021     9:18 AM   Amb Fall Risk Assessment and TUG Test   Fall in past 12 months? 0   Able to walk? Yes              10/5/2023    11:06 AM   PHQ-9    Little interest or pleasure in doing things 0   Feeling down, depressed, or hopeless 0   PHQ-2 Score 0   PHQ-9 Total Score 0          Health Maintenance Due   Topic Date Due    Pneumococcal 0-64 years Vaccine (1 - PCV) Never done    HIV screen  Never done    DTaP/Tdap/Td vaccine (1 - Tdap) Never done    Cervical cancer screen  Never done    Shingles vaccine (1 of 2) Never done    Low dose CT lung screening &/or counseling  Never done    COVID-19 Vaccine (6 - Moderna series) 11/26/2022    Flu vaccine (1) Never done    Colorectal Cancer Screen  09/18/2023         No question data found.

## 2023-10-26 LAB — NONINV COLON CA DNA+OCC BLD SCRN STL QL: NEGATIVE

## 2023-12-14 DIAGNOSIS — S46.112S RUPTURE OF LEFT LONG HEAD BICEPS TENDON, SEQUELA: Primary | ICD-10-CM

## 2023-12-14 RX ORDER — TRAMADOL HYDROCHLORIDE 50 MG/1
50 TABLET ORAL EVERY 4 HOURS PRN
Qty: 42 TABLET | Refills: 0 | Status: SHIPPED | OUTPATIENT
Start: 2023-12-14 | End: 2023-12-21

## 2023-12-14 NOTE — PROGRESS NOTES
Spoke with patient on the phone, states she is still in sever pain with her left shoulder injury. Has received two cortisone injections with no relief. She would like a second opinion. Requested rx of tramadol for short term use as she is taking 4000mg tylenol a day for the pain. Referral to dr. Lauren Holder placed and rx for short course of tramadol provided.

## 2024-02-02 RX ORDER — METHOCARBAMOL 500 MG/1
500 TABLET, FILM COATED ORAL 4 TIMES DAILY
Qty: 90 TABLET | Refills: 1 | Status: SHIPPED | OUTPATIENT
Start: 2024-02-02

## 2024-02-08 ENCOUNTER — TELEPHONE (OUTPATIENT)
Facility: CLINIC | Age: 64
End: 2024-02-08

## 2024-02-08 DIAGNOSIS — J06.9 VIRAL URI: Primary | ICD-10-CM

## 2024-02-08 DIAGNOSIS — R05.1 ACUTE COUGH: ICD-10-CM

## 2024-02-08 RX ORDER — BENZONATATE 200 MG/1
CAPSULE ORAL
Qty: 90 CAPSULE | Refills: 1 | Status: SHIPPED | OUTPATIENT
Start: 2024-02-08

## 2024-02-08 NOTE — TELEPHONE ENCOUNTER
Patient called stating she needs a cough syrup for her cough, she recently went to the hospital ans stated they didn't give her no medication and did her a covid test and she retested herself today and it came back negative. She will like a call to discuss this.

## 2024-02-08 NOTE — TELEPHONE ENCOUNTER
Returned call.  States she would like something for her cough and congestion.  Treatment: mucinex  Rx for tessalon perles provided.  Patient verbalized understanding and is in agreement with this plan of care

## 2024-03-24 RX ORDER — ATORVASTATIN CALCIUM 10 MG/1
10 TABLET, FILM COATED ORAL DAILY
Qty: 90 TABLET | Refills: 1 | Status: SHIPPED | OUTPATIENT
Start: 2024-03-24

## 2024-03-26 RX ORDER — METHOCARBAMOL 500 MG/1
TABLET, FILM COATED ORAL
Qty: 90 TABLET | Refills: 1 | Status: SHIPPED | OUTPATIENT
Start: 2024-03-26

## 2024-04-03 LAB
A/G RATIO: 2 RATIO (ref 1.1–2.6)
ALBUMIN SERPL-MCNC: 4.3 G/DL (ref 3.5–5)
ALP BLD-CCNC: 62 U/L (ref 40–120)
ALT SERPL-CCNC: 15 U/L (ref 5–40)
ANION GAP SERPL CALCULATED.3IONS-SCNC: 8 MMOL/L (ref 3–15)
AST SERPL-CCNC: 20 U/L (ref 10–37)
BILIRUB SERPL-MCNC: 0.4 MG/DL (ref 0.2–1.2)
BUN BLDV-MCNC: 14 MG/DL (ref 6–22)
CALCIUM SERPL-MCNC: 9.5 MG/DL (ref 8.4–10.5)
CHLORIDE BLD-SCNC: 100 MMOL/L (ref 98–110)
CHOLESTEROL/HDL RATIO: 3 (ref 0–5)
CHOLESTEROL: 134 MG/DL (ref 110–200)
CO2: 29 MMOL/L (ref 20–32)
CREAT SERPL-MCNC: 0.6 MG/DL (ref 0.8–1.4)
GLOBULIN: 2.1 G/DL (ref 2–4)
GLOMERULAR FILTRATION RATE: >60 ML/MIN/1.73 SQ.M.
GLUCOSE: 86 MG/DL (ref 70–99)
HDLC SERPL-MCNC: 44 MG/DL
LDL CHOLESTEROL CALCULATED: 61 MG/DL (ref 50–99)
LDL/HDL RATIO: 1.4
NON-HDL CHOLESTEROL: 90 MG/DL
POTASSIUM SERPL-SCNC: 4.2 MMOL/L (ref 3.5–5.5)
SODIUM BLD-SCNC: 137 MMOL/L (ref 133–145)
TOTAL PROTEIN: 6.4 G/DL (ref 6.2–8.1)
TRIGL SERPL-MCNC: 142 MG/DL (ref 40–149)
VLDLC SERPL CALC-MCNC: 28 MG/DL (ref 8–30)

## 2024-04-04 DIAGNOSIS — I10 ESSENTIAL HYPERTENSION: ICD-10-CM

## 2024-04-05 ENCOUNTER — OFFICE VISIT (OUTPATIENT)
Facility: CLINIC | Age: 64
End: 2024-04-05
Payer: MEDICAID

## 2024-04-05 VITALS
DIASTOLIC BLOOD PRESSURE: 77 MMHG | OXYGEN SATURATION: 97 % | SYSTOLIC BLOOD PRESSURE: 148 MMHG | HEART RATE: 56 BPM | HEIGHT: 67 IN | TEMPERATURE: 98.5 F | RESPIRATION RATE: 16 BRPM | WEIGHT: 156 LBS | BODY MASS INDEX: 24.48 KG/M2

## 2024-04-05 DIAGNOSIS — S46.112D RUPTURE OF LEFT LONG HEAD BICEPS TENDON, SUBSEQUENT ENCOUNTER: ICD-10-CM

## 2024-04-05 DIAGNOSIS — Z00.00 WELL WOMAN EXAM (NO GYNECOLOGICAL EXAM): Primary | ICD-10-CM

## 2024-04-05 DIAGNOSIS — M25.512 CHRONIC LEFT SHOULDER PAIN: ICD-10-CM

## 2024-04-05 DIAGNOSIS — G89.29 CHRONIC LEFT SHOULDER PAIN: ICD-10-CM

## 2024-04-05 DIAGNOSIS — I10 ESSENTIAL HYPERTENSION: ICD-10-CM

## 2024-04-05 DIAGNOSIS — M75.52 SUBACROMIAL BURSITIS OF LEFT SHOULDER JOINT: ICD-10-CM

## 2024-04-05 PROCEDURE — 3078F DIAST BP <80 MM HG: CPT | Performed by: NURSE PRACTITIONER

## 2024-04-05 PROCEDURE — 3077F SYST BP >= 140 MM HG: CPT | Performed by: NURSE PRACTITIONER

## 2024-04-05 PROCEDURE — 99396 PREV VISIT EST AGE 40-64: CPT | Performed by: NURSE PRACTITIONER

## 2024-04-05 RX ORDER — AMLODIPINE BESYLATE 2.5 MG/1
2.5 TABLET ORAL DAILY
Qty: 90 TABLET | Refills: 1 | Status: SHIPPED | OUTPATIENT
Start: 2024-04-05

## 2024-04-05 RX ORDER — AMLODIPINE BESYLATE 5 MG/1
5 TABLET ORAL DAILY
Qty: 30 TABLET | Status: SHIPPED | COMMUNITY
Start: 2024-04-05

## 2024-04-05 RX ORDER — ATENOLOL 100 MG/1
100 TABLET ORAL DAILY
Qty: 90 TABLET | Refills: 1 | OUTPATIENT
Start: 2024-04-05

## 2024-04-05 RX ORDER — ATENOLOL 100 MG/1
100 TABLET ORAL DAILY
Qty: 90 TABLET | Refills: 1 | Status: SHIPPED | OUTPATIENT
Start: 2024-04-05

## 2024-04-05 SDOH — ECONOMIC STABILITY: FOOD INSECURITY: WITHIN THE PAST 12 MONTHS, THE FOOD YOU BOUGHT JUST DIDN'T LAST AND YOU DIDN'T HAVE MONEY TO GET MORE.: NEVER TRUE

## 2024-04-05 SDOH — ECONOMIC STABILITY: FOOD INSECURITY: WITHIN THE PAST 12 MONTHS, YOU WORRIED THAT YOUR FOOD WOULD RUN OUT BEFORE YOU GOT MONEY TO BUY MORE.: NEVER TRUE

## 2024-04-05 SDOH — ECONOMIC STABILITY: INCOME INSECURITY: HOW HARD IS IT FOR YOU TO PAY FOR THE VERY BASICS LIKE FOOD, HOUSING, MEDICAL CARE, AND HEATING?: NOT HARD AT ALL

## 2024-04-05 ASSESSMENT — ENCOUNTER SYMPTOMS
EYES NEGATIVE: 1
RESPIRATORY NEGATIVE: 1

## 2024-04-05 ASSESSMENT — PATIENT HEALTH QUESTIONNAIRE - PHQ9
1. LITTLE INTEREST OR PLEASURE IN DOING THINGS: NOT AT ALL
SUM OF ALL RESPONSES TO PHQ9 QUESTIONS 1 & 2: 0
SUM OF ALL RESPONSES TO PHQ QUESTIONS 1-9: 0
2. FEELING DOWN, DEPRESSED OR HOPELESS: NOT AT ALL

## 2024-04-05 NOTE — PROGRESS NOTES
packs/day: 0.00     Types: Cigarettes     Quit date: 2021     Years since quittin.5    Smokeless tobacco: Never   Vaping Use    Vaping Use: Never used   Substance Use Topics    Alcohol use: No    Drug use: No       Medications/Allergies:  Current Outpatient Medications on File Prior to Visit   Medication Sig Dispense Refill    methocarbamol (ROBAXIN) 500 MG tablet TAKE 1 TABLET BY MOUTH IN THE MORNING, 1 AT NOON, 1 IN THE EVENING, AND 1 AT BEDTIME 90 tablet 1    atorvastatin (LIPITOR) 10 MG tablet TAKE 1 TABLET BY MOUTH DAILY 90 tablet 1    benzonatate (TESSALON) 200 MG capsule TAKE 1 CAPSULE BY MOUTH THREE TIMES DAILY AS NEEDED FOR COUGH 90 capsule 1    atenolol (TENORMIN) 100 MG tablet TAKE 1 TABLET BY MOUTH DAILY 90 tablet 1    acetaminophen (TYLENOL) 500 MG tablet Take 1 tablet by mouth every 6 hours as needed      amLODIPine (NORVASC) 2.5 MG tablet ceived the following from [facility]: Outside name: amLODIPine (NORVASC) 2.5 mg tablet      amLODIPine (NORVASC) 5 MG tablet Take 1.5 tablets by mouth daily      celecoxib (CELEBREX) 100 MG capsule TK 1 C PO BID      esomeprazole (NEXIUM) 20 MG delayed release capsule Take by mouth      hydroxychloroquine (PLAQUENIL) 200 MG tablet Take 1 tablet by mouth 2 times daily       No current facility-administered medications on file prior to visit.        Allergies   Allergen Reactions    Cephalexin Rash and Hives       Objective:  Ht 1.702 m (5' 7\")   Wt 70.8 kg (156 lb)   BMI 24.43 kg/m²  Body mass index is 24.43 kg/m².    Physical assessment  Physical Exam  Constitutional:       General: She is awake.      Appearance: Normal appearance.   HENT:      Head: Normocephalic and atraumatic.      Right Ear: Hearing, tympanic membrane, ear canal and external ear normal.      Left Ear: Hearing, tympanic membrane, ear canal and external ear normal.      Mouth/Throat:      Lips: Pink.      Mouth: Mucous membranes are moist.      Pharynx: Oropharynx is

## 2024-05-15 ENCOUNTER — OFFICE VISIT (OUTPATIENT)
Age: 64
End: 2024-05-15
Payer: MEDICAID

## 2024-05-15 VITALS — RESPIRATION RATE: 16 BRPM | BODY MASS INDEX: 24.8 KG/M2 | HEIGHT: 67 IN | WEIGHT: 158 LBS

## 2024-05-15 DIAGNOSIS — S46.012A TRAUMATIC INCOMPLETE TEAR OF LEFT ROTATOR CUFF, INITIAL ENCOUNTER: Primary | ICD-10-CM

## 2024-05-15 DIAGNOSIS — S46.112A RUPTURE OF LEFT LONG HEAD BICEPS TENDON, INITIAL ENCOUNTER: ICD-10-CM

## 2024-05-15 PROCEDURE — 99204 OFFICE O/P NEW MOD 45 MIN: CPT | Performed by: ORTHOPAEDIC SURGERY

## 2024-05-15 NOTE — PROGRESS NOTES
[facility] AMBULATORY OFFICE NOTE      Patient: Christi Page                MRN: [id number]       SSN: [id number]  YOB: 1960        AGE: 63 y.o.        SEX: female  Body mass index is 24.75 kg/m².    PCP: Ana Maria Nelson, [facility] - [facility]  05/15/24    CHIEF COMPLAINT: Left shoulder pain    HPI: Christi Page is a 63 y.o. female patient who complains of 1 year of left shoulder pain.  She had x-rays and an MRI.  She has had 2 injections at [facility].  She says she did not get relief from the injection.  She is also been to physical therapy which also did not give her much relief.  She is initially felt the pain and a pop with some bruising in her left shoulder when she was reaching for things in a high cabinet.  No numbness and tingling.  No surgery to the left shoulder.    Past Medical History:   Diagnosis Date    Back pain     Hypertension     Menopause        No family history on file.    Current Outpatient Medications   Medication Sig Dispense Refill    amLODIPine (NORVASC) 2.5 MG tablet Take 1 tablet by mouth daily Take with 5mg for a total of 7.5mg daily 90 tablet 1    atenolol (TENORMIN) 100 MG tablet Take 1 tablet by mouth daily 90 tablet 1    amLODIPine (NORVASC) 5 MG tablet Take 1 tablet by mouth daily Take with 2.5mg for total of 7.5mg daily 30 tablet     methocarbamol (ROBAXIN) 500 MG tablet TAKE 1 TABLET BY MOUTH IN THE MORNING, 1 AT NOON, 1 IN THE EVENING, AND 1 AT BEDTIME 90 tablet 1    atorvastatin (LIPITOR) 10 MG tablet TAKE 1 TABLET BY MOUTH DAILY 90 tablet 1    benzonatate (TESSALON) 200 MG capsule TAKE 1 CAPSULE BY MOUTH THREE TIMES DAILY AS NEEDED FOR COUGH 90 capsule 1    acetaminophen (TYLENOL) 500 MG tablet Take 1 tablet by mouth every 6 hours as needed      celecoxib (CELEBREX) 100 MG capsule TK 1 C PO BID      esomeprazole (NEXIUM) 20 MG delayed release capsule Take by mouth      hydroxychloroquine (PLAQUENIL) 200 MG tablet Take 1 tablet

## 2024-05-16 ENCOUNTER — HOSPITAL ENCOUNTER (OUTPATIENT)
Facility: HOSPITAL | Age: 64
Setting detail: RECURRING SERIES
End: 2024-05-16
Payer: MEDICAID

## 2024-05-20 RX ORDER — METHOCARBAMOL 500 MG/1
TABLET, FILM COATED ORAL
Qty: 90 TABLET | Refills: 1 | Status: SHIPPED | OUTPATIENT
Start: 2024-05-20

## 2024-05-23 ENCOUNTER — HOSPITAL ENCOUNTER (OUTPATIENT)
Facility: HOSPITAL | Age: 64
Setting detail: RECURRING SERIES
Discharge: HOME | End: 2024-05-26
Payer: MEDICAID

## 2024-05-23 PROCEDURE — 97161 PT EVAL LOW COMPLEX 20 MIN: CPT

## 2024-05-23 NOTE — THERAPY EVALUATION
[facility]  [address], [address], [place] [address] Ph:[phone] Fx: [phone]  Plan of Care / Statement of Necessity for Physical Therapy Services     Patient Name: Christi Page : 1960   Medical   Diagnosis: Cervicalgia [M54.2] Treatment Diagnosis:  M54.2, G89.29  CHRONIC NECK PAIN    Onset Date: Chronic (Recent Surgery )     Referral Source: Jennifer Niño APRN -* Start of Care (SOC): 2024   Prior Hospitalization: See medical history Provider #: [id number]   Prior Level of Function: Ind and less discomfort with ADLs and activities of leisure; cannot lift > 5 lbs   Comorbidities: Cholesterol. Arthritis. Osteoporosis. RA.      Assessment / key information:    Patient is a 63 y.o. year old female. Primary complaints of Cervicalgia [M54.2] that began in the  from \"disc removal surgery\"  and has had recent surgery in  having a cage placed for a fusion. Locates pressure at central cervical spine to upper thoracic spine. Describes pain as pressure and discomfort and constant in nature. Tylenol; laying down with pillows propped up makes the pain better. Getting up in the morning; middle of the night; lifting > 1 coffee cup makes the pain worse. Patient is retired. She sleeps ~4 hours a night on side/stomach with 1 pillow. Patient enjoys taking part in cooking and cleaning. Their primary goals in PT are to lessen discomfort and pressure, and to improve strength in UEs in order to take part in these activities of leisure. Patient recently saw Jennifer Niño APRN -* who prescribed OPPT. They are scheduled to return to their provider on PRN.    Not post operative    Prior Diagnostic Tests: [] Lab work [x] X-rays    [] CT [x] MRI     [] Other:  Results:    Medical History:   Allergies: Keflex. Seasonal.   Medical History: See above comorbidities.  Medications: Tylenol. Celebrex. Statin. Amlodipine. Nexium. Hydrochloroquin.

## 2024-05-23 NOTE — PROGRESS NOTES
PHYSICAL / OCCUPATIONAL THERAPY - DAILY TREATMENT NOTE (updated )  For Eval visit    Patient Name: Christi Page    Date: 2024    : 1960  Insurance: Payor: [facility] MEDICAID / Plan: [facility] CARDINAL CARE / Product Type: *No Product type* /      Patient  verified yes     Visit #   Current / Total 1 16   Time   In / Out 11:00 am 11:40 am   Pain   In / Out 0 0   Subjective Functional Status/Changes: See POC     TREATMENT AREA =  Cervicalgia [M54.2]    OBJECTIVE    30 min   Eval - untimed                      Therapeutic Procedures:  Tx Min Billable or 1:1 Min (if diff from Tx Min) Procedure, Rationale, Specifics   5  12756 Self Care/Home Management (timed):  improve patient knowledge and understanding of pain reducing techniques, positioning, posture/ergonomics, home safety, activity modification, diagnosis/prognosis, and physical therapy expectations, procedures and progression  to improve patient's ability to progress to PLOF and address remaining functional goals.  (see flow sheet as applicable)     Details if applicable:     5  79451 Therapeutic Activity (timed):  use of dynamic activities replicating functional movements to increase ROM, strength, coordination, balance, and proprioception in order to improve patient's ability to progress to PLOF and address remaining functional goals.  (see flow sheet as applicable)     Details if applicable:     10  MC BC Totals Reminder: bill using total billable min of TIMED therapeutic procedures (example: do not include dry needle or estim unattended, both untimed codes, in totals to left)  8-22 min = 1 unit; 23-37 min = 2 units; 38-52 min = 3 units; 53-67 min = 4 units; 68-82 min = 5 units   Total Total     [x]  Patient Education billed concurrently with other procedures   [x] Review HEP    [] Progressed/Changed HEP, detail:    [] Other detail:       Objective Information/Functional Measures/Assessment    See POC.    Patient will continue

## 2024-06-10 ENCOUNTER — HOSPITAL ENCOUNTER (OUTPATIENT)
Facility: HOSPITAL | Age: 64
Setting detail: RECURRING SERIES
Discharge: HOME | End: 2024-06-13
Payer: MEDICAID

## 2024-06-10 PROCEDURE — 97112 NEUROMUSCULAR REEDUCATION: CPT

## 2024-06-10 PROCEDURE — 97535 SELF CARE MNGMENT TRAINING: CPT

## 2024-06-10 PROCEDURE — 97110 THERAPEUTIC EXERCISES: CPT

## 2024-06-10 NOTE — PROGRESS NOTES
Date   16 V  8/2/24       PLAN  Yes  Continue plan of care  [x]  Upgrade activities as tolerated  []  Discharge due to :  []  Other:    Sebastian Lopez PT    6/10/2024    9:43 AM    Future Appointments   Date Time Provider Department Center   6/10/2024  1:00 PM Sebastian Lopez, PT [facility]   6/12/2024 11:40 AM Sebastian Lopez, PT [facility]   6/14/2024  9:15 AM Ana Maria Nelson APRN - CNP AMA [facility]   7/29/2024 10:00 AM AMA LAB [facility]   8/5/2024 10:45 AM Ana Maria Nelson APRN - CNP AMA BS [facility]   8/9/2024  9:15 AM [facility] RM 1 [facility]

## 2024-06-12 ENCOUNTER — HOSPITAL ENCOUNTER (OUTPATIENT)
Facility: HOSPITAL | Age: 64
Setting detail: RECURRING SERIES
Discharge: HOME | End: 2024-06-15
Payer: MEDICAID

## 2024-06-12 PROCEDURE — 97110 THERAPEUTIC EXERCISES: CPT

## 2024-06-12 PROCEDURE — 97535 SELF CARE MNGMENT TRAINING: CPT

## 2024-06-12 PROCEDURE — 97112 NEUROMUSCULAR REEDUCATION: CPT

## 2024-06-12 NOTE — PROGRESS NOTES
PHYSICAL / OCCUPATIONAL THERAPY - DAILY TREATMENT NOTE (updated )    Patient Name: Christi Page    Date: 2024    : 1960  Insurance: Payor:  MEDICAID / Plan: [facility] PLAN CARDINAL CARE / Product Type: *No Product type* /      Patient  verified Yes     Visit #   Current / Total 3 16   Time   In / Out 11:40 12:22   Pain   In / Out 0/10  0/10   Subjective Functional Status/Changes: She has L eye surgery next Thursday on 24 and then the R on 24. Cataract surgery. Wont be able to move the neck much after the surgery.     Calling the neck a lost cause. It is just very stiff.      TREATMENT AREA =  Cervicalgia [M54.2]    OBJECTIVE    Modalities Rationale:     decrease edema, decrease inflammation, decrease pain, and increase tissue extensibility to improve patient's ability to progress to PLOF and address remaining functional goals.     min [] Estim Unattended, type/location:                                      []  w/ice    []  w/heat    min [] Estim Attended, type/location:                                     []  w/US     []  w/ice    []  w/heat    []  TENS insruct      min []  Mechanical Traction: type/lbs                   []  pro   []  sup   []  int   []  cont    []  before manual    []  after manual    min []  Ultrasound, settings/location:     ND min  unbill [x]  Ice     [x]  Heat    location/position: MHP for CS and CP for LS in supine     min []  Paraffin,  details:     min []  Vasopneumatic Device, press/temp:     min []  Whirlpool / Fluido:    If using vaso (only need to measure limb vaso being performed on)      pre-treatment girth :       post-treatment girth :       measured at (landmark location) :      min []  Other:    Skin assessment post-treatment:   Intact      Therapeutic Procedures:  Tx Min Billable or 1:1 Min (if diff from Tx Min) Procedure, Rationale, Specifics   10  13619 Therapeutic Exercise (timed):  increase ROM, strength, coordination, balance,

## 2024-07-15 RX ORDER — METHOCARBAMOL 500 MG/1
TABLET, FILM COATED ORAL
Qty: 90 TABLET | Refills: 1 | Status: SHIPPED | OUTPATIENT
Start: 2024-07-15

## 2024-08-09 ENCOUNTER — HOSPITAL ENCOUNTER (OUTPATIENT)
Dept: WOMENS IMAGING | Facility: HOSPITAL | Age: 64
End: 2024-08-09
Payer: MEDICARE

## 2024-08-09 DIAGNOSIS — Z12.31 SCREENING MAMMOGRAM FOR BREAST CANCER: ICD-10-CM

## 2024-08-09 PROCEDURE — 77063 BREAST TOMOSYNTHESIS BI: CPT

## 2024-09-16 RX ORDER — ATORVASTATIN CALCIUM 10 MG/1
10 TABLET, FILM COATED ORAL DAILY
Qty: 90 TABLET | Refills: 1 | OUTPATIENT
Start: 2024-09-16

## 2024-12-04 DIAGNOSIS — I10 ESSENTIAL HYPERTENSION: ICD-10-CM

## 2024-12-04 RX ORDER — ATENOLOL 100 MG/1
100 TABLET ORAL DAILY
Qty: 90 TABLET | Refills: 1 | OUTPATIENT
Start: 2024-12-04

## 2024-12-04 NOTE — TELEPHONE ENCOUNTER
Last office visit was 4-5-24  Return in about 4 months (around 8/5/2024) for HTN, 15min, office, w/labsprior.   Inactive my chart. Unable to send a scheduling ticket.

## 2024-12-16 ENCOUNTER — HOSPITAL ENCOUNTER (OUTPATIENT)
Facility: HOSPITAL | Age: 64
Setting detail: RECURRING SERIES
Discharge: HOME | End: 2024-12-19
Payer: MEDICARE

## 2024-12-16 PROCEDURE — 97535 SELF CARE MNGMENT TRAINING: CPT

## 2024-12-16 PROCEDURE — 97110 THERAPEUTIC EXERCISES: CPT

## 2024-12-16 PROCEDURE — 97161 PT EVAL LOW COMPLEX 20 MIN: CPT

## 2024-12-16 NOTE — THERAPY EVALUATION
mobility, and decrease transfer abilities    Treatment Plan may include any combination of the followin Therapeutic Exercise, 58308 Neuromuscular Re-Education, 70908 Manual Therapy, 29461 Therapeutic Activity, 24885 Self Care/Home Management, 46030 Electrical Stim unattended /  (MCR), 10675 Electrical Stim attended, and 03384 Ultrasound  Patient / Family readiness to learn indicated by: asking questions, trying to perform skills, interest, return verbalization , and return demonstration   Persons(s) to be included in education: patient (P)  Barriers to Learning/Limitations: none  Measures taken if barriers to learning present: NA  Patient Goal (s): \"moving left arm, full movement\"  Patient Self Reported Health Status: good  Rehabilitation Potential: good    Short Term Goals: To be accomplished in 4 weeks  1) Pt will be IND with HEP to facilitate self care management.   EVAL: Provided HEP  Current Status: - same  Goal Met?  - NA    2) Pt will improve L shoulder PROM to flexion and scaption to >/= 110 deg and ER to >/= 50 deg in order to improve ability to perform ADLs with improved ROM and less restriction.    EVAL: Shoulder PROM   L flexion 90 deg         R flexion NT  L ABD 90 deg     R ABD NT  L ER 35 deg     R ER NT  L IR 30 deg    R IR NT  Current Status: - same  Goal Met?  - NA    3) Pt will be able to reach over her head to do her hair for personal hygiene ADLs.   EVAL: unable  Current Status: - same  Goal Met?  - NA    Long Term Goals: To be accomplished in 8 weeks  1) Pt will improve QDASH scores to </= 44% in order to show detectable change in overall function.    EVAL: 63.6%  Current Status: - same  Goal Met?  - NA    2) Pt will be able to perform overhead lifting >/= 3 lb with </= 3/10 pain signifying improvement in overall functional capacity and activity tolerance.    EVAL: unable  Current Status: - same  Goal Met?  - NA    3) Pt will improve L shoulder MMT to >/= 4/5 to order to improve

## 2024-12-16 NOTE — PROGRESS NOTES
care  [x]  Upgrade activities as tolerated  []  Other:    Sebastian Lopez, PT    12/16/2024    2:24 PM    If an interpreting service was utilized for treatment of this patient, the contents of this document represent the material reviewed with the patient via the .     Future Appointments   Date Time Provider Department Center   12/20/2024  9:40 AM Sebastian Lopez, PT [facility]   8/11/2025  9:45 AM [facility] RITCHIE BX RM 1 [facility]

## 2024-12-20 ENCOUNTER — HOSPITAL ENCOUNTER (OUTPATIENT)
Facility: HOSPITAL | Age: 64
Setting detail: RECURRING SERIES
Discharge: HOME | End: 2024-12-23
Payer: MEDICARE

## 2024-12-20 VITALS — SYSTOLIC BLOOD PRESSURE: 130 MMHG | DIASTOLIC BLOOD PRESSURE: 75 MMHG

## 2024-12-20 PROCEDURE — 97530 THERAPEUTIC ACTIVITIES: CPT

## 2024-12-20 PROCEDURE — 97535 SELF CARE MNGMENT TRAINING: CPT

## 2024-12-20 PROCEDURE — 97110 THERAPEUTIC EXERCISES: CPT

## 2024-12-23 ENCOUNTER — HOSPITAL ENCOUNTER (OUTPATIENT)
Facility: HOSPITAL | Age: 64
Setting detail: RECURRING SERIES
Discharge: HOME | End: 2024-12-26
Payer: MEDICARE

## 2024-12-23 PROCEDURE — 97112 NEUROMUSCULAR REEDUCATION: CPT

## 2024-12-23 PROCEDURE — 97110 THERAPEUTIC EXERCISES: CPT

## 2024-12-23 PROCEDURE — 97530 THERAPEUTIC ACTIVITIES: CPT

## 2024-12-23 NOTE — PROGRESS NOTES
Other:    Sebastian Lopez PT    12/23/2024    9:20 AM    If an interpreting service was utilized for treatment of this patient, the contents of this document represent the material reviewed with the patient via the .     Future Appointments   Date Time Provider Department Center   12/23/2024 11:40 AM Sebastian Lopez, PT [facility]   12/31/2024 10:20 AM Sebsatian Lopez, PT [facility]   1/6/2025 11:00 AM Sebastian Lopez, PT [facility]   1/8/2025 10:20 AM Sebastian Lopez, PT [facility]   1/13/2025 11:00 AM Sebastian Lopez, PT [facility]   1/15/2025 10:20 AM Sebastian Lopez, PT [facility]   1/20/2025 10:20 AM Sebastian Lopez, PT [facility]   1/22/2025 10:20 AM Sebastian Lopez PT [facility]   8/11/2025  9:45 AM OM[facility] RITCHIE BX RM 1 [facility]

## 2024-12-31 ENCOUNTER — HOSPITAL ENCOUNTER (OUTPATIENT)
Facility: HOSPITAL | Age: 64
Setting detail: RECURRING SERIES
Discharge: HOME | End: 2025-01-03
Payer: MEDICARE

## 2024-12-31 PROCEDURE — 97530 THERAPEUTIC ACTIVITIES: CPT

## 2024-12-31 PROCEDURE — 97112 NEUROMUSCULAR REEDUCATION: CPT

## 2024-12-31 PROCEDURE — 97110 THERAPEUTIC EXERCISES: CPT

## 2024-12-31 NOTE — PROGRESS NOTES
PHYSICAL / OCCUPATIONAL THERAPY - DAILY TREATMENT NOTE (updated )    Patient Name: Christi Page    Date: 2024    : 1960  Insurance: Payor: [facility] MEDICARE / Plan: [employer] FULL DUAL ADVANTAGE 2 / Product Type: *No Product type* /      Patient  verified Yes     Visit #   Current / Total 4 16   Time   In / Out 10:20 11:10   Pain   In / Out 1/10 1/10   Subjective Functional Status/Changes: The shoulder is little achy today.      TREATMENT AREA =  Left shoulder pain [M25.512]    OBJECTIVE    Modalities Rationale:     decrease edema, decrease inflammation, and decrease pain to improve patient's ability to progress to PLOF and address remaining functional goals.     min [] Estim Unattended, type/location:                                      []  w/ice    []  w/heat    min [] Estim Attended, type/location:                                     []  w/US     []  w/ice    []  w/heat    []  TENS insruct      min []  Mechanical Traction: type/lbs                   []  pro   []  sup   []  int   []  cont    []  before manual    []  after manual    min []  Ultrasound, settings/location:     10 min  unbill [x]  Ice     []  Heat    location/position: Siting L shoulder    min []  Paraffin,  details:     min []  Vasopneumatic Device, press/temp:     min []  Whirlpool / Fluido:    If using vaso (only need to measure limb vaso being performed on)      pre-treatment girth :       post-treatment girth :       measured at (landmark location) :      min []  Other:    Skin assessment post-treatment:   Intact      Therapeutic Procedures:  Tx Min Billable or 1:1 Min (if diff from Tx Min) Procedure, Rationale, Specifics   10  73163 Therapeutic Exercise (timed):  increase ROM, strength, coordination, balance, and proprioception to improve patient's ability to progress to PLOF and address remaining functional goals. (see flow sheet as applicable)     Details if applicable:    L shoulder PROM all planes within protocol limits

## 2025-01-06 ENCOUNTER — APPOINTMENT (OUTPATIENT)
Facility: HOSPITAL | Age: 65
End: 2025-01-06
Payer: MEDICARE

## 2025-01-08 ENCOUNTER — HOSPITAL ENCOUNTER (OUTPATIENT)
Facility: HOSPITAL | Age: 65
Setting detail: RECURRING SERIES
Discharge: HOME | End: 2025-01-11
Payer: MEDICARE

## 2025-01-08 PROCEDURE — 97110 THERAPEUTIC EXERCISES: CPT

## 2025-01-08 PROCEDURE — 97112 NEUROMUSCULAR REEDUCATION: CPT

## 2025-01-08 PROCEDURE — 97530 THERAPEUTIC ACTIVITIES: CPT

## 2025-01-08 NOTE — PROGRESS NOTES
applicable at this time.     NEXT PN/RC DUE RECERT DATE   2/8/25 2/16/25      Auth Visit Count Auth Expiration Date   Med nec 12/31/25     PLAN  Yes  Continue plan of care  [x]  Upgrade activities as tolerated  []  Discharge due to :  []  Other:    Sebastian Lopez PT    1/8/2025    9:19 AM    If an interpreting service was utilized for treatment of this patient, the contents of this document represent the material reviewed with the patient via the .     Future Appointments   Date Time Provider Department Center   1/8/2025 10:20 AM Sebastian Lopez PT [facility]   1/10/2025  2:20 PM Sebastian Lopez PT [facility]   1/13/2025 11:00 AM Sebastian Lopez PT [facility]   1/15/2025 10:20 AM Sebastian Lopez, PT [facility]   1/20/2025 10:20 AM Sebastian Lopez PT [facility]   1/22/2025 10:20 AM Sebastian Lopez PT [facility]   8/11/2025  9:45 AM [facility] RITCHIE BX RM 1 [facility]

## 2025-01-08 NOTE — THERAPY RECERTIFICATION
[facility]  [address],[place] [address] - Ph: [phone]  Fx: [phone]  PHYSICAL THERAPY PROGRESS NOTE  Patient Name: Christi Page : 1960   Treatment/Medical Diagnosis: Left shoulder pain [M25.512]   Referral Source: Jose Miguel Traore MD     Date of Initial Visit: 24 Attended Visits: 5 Missed Visits: 0     SUMMARY OF TREATMENT  Pt has been evaluated/assessed and participated in 5 PT sessions involving skilled therapeutic exercise, functional activity training,?neuromuscular facilitation and coordination training, patient education,?manual therapy interventions including STM and PROM/srtetching per protocol, modalities including ice, and instruction on HEP in order to improve upon L shoulder ROM, strength, decreased pain, and return to PLOF.    SUBJECTIVE: the shoulder is still achy and more achy today because it is cold outside.     CURRENT STATUS  Pt is s/p L shoulder reverses TSA performed about 5 weeks ago on 12/3/24.  Pt has made good progress in PT. Max pain level at 1/10, average pain level at 1/10, least pain level at 0.5/10 after taking medication. Pt report 40% improvement since beginning therapy. Pt also notes still frustrated with continued pain and aching being only 5 weeks post op. Unable to pull up her pants IND which Is frustrating for her. Still notes tenderness along the shoulder. Explained to the pt that this can be normal this far out from surgery and how it will take time for the shoulder to heal. The incision appears really good. Pt notes having been performing the HEP as prescribed. Pt will continue to benefit from skilled PT to progress exercises and improve upon?functional activities.    OBJECTIVE:   L shoulder PROM   Flexion 150 deg   deg   ER 60 deg   IR hand to chest    GROC +2 A LITTLE BETTER    PROGRESS TO GOALS  Short Term Goals: To be accomplished in 4 weeks  1) Pt will be

## 2025-01-10 ENCOUNTER — HOSPITAL ENCOUNTER (OUTPATIENT)
Facility: HOSPITAL | Age: 65
Setting detail: RECURRING SERIES
Discharge: HOME | End: 2025-01-13
Payer: MEDICARE

## 2025-01-10 PROCEDURE — 97535 SELF CARE MNGMENT TRAINING: CPT

## 2025-01-10 PROCEDURE — 97112 NEUROMUSCULAR REEDUCATION: CPT

## 2025-01-10 PROCEDURE — 97110 THERAPEUTIC EXERCISES: CPT

## 2025-01-10 NOTE — PROGRESS NOTES
deficits, analyze and address strength deficits, analyze and address soft tissue restrictions, analyze and cue for proper movement patterns, analyze and modify for postural abnormalities, and instruct in home and community integration to address functional deficits and attain remaining goals.    Progress toward goals / Updated goals:  []  See Progress Note/Recertification     PROM doing well at this time.     All goals remain applicable at this time.     NEXT PN/RC DUE RECERT DATE   2/8/25 2/16/25      Auth Visit Count Auth Expiration Date   Med nec 12/31/25     PLAN  Yes  Continue plan of care  [x]  Upgrade activities as tolerated  []  Discharge due to :  []  Other:    Sebastian Lopez PT    1/10/2025    8:47 AM    If an interpreting service was utilized for treatment of this patient, the contents of this document represent the material reviewed with the patient via the .     Future Appointments   Date Time Provider Department Center   1/10/2025  2:20 PM Sebastian Lopez PT [facility]   1/13/2025 11:00 AM Sebastian Lopez PT [facility]   1/15/2025 10:20 AM Sebastian Lopez PT [facility]   1/20/2025 10:20 AM Sebastian Lopez PT [facility]   1/22/2025 10:20 AM Sebastian Lopez PT [facility]   8/11/2025  9:45 AM OMID LEMUS RM 1 [facility]

## 2025-01-13 ENCOUNTER — HOSPITAL ENCOUNTER (OUTPATIENT)
Facility: HOSPITAL | Age: 65
Setting detail: RECURRING SERIES
Discharge: HOME | End: 2025-01-16
Payer: MEDICARE

## 2025-01-13 PROCEDURE — 97112 NEUROMUSCULAR REEDUCATION: CPT

## 2025-01-13 PROCEDURE — 97530 THERAPEUTIC ACTIVITIES: CPT

## 2025-01-13 PROCEDURE — 97110 THERAPEUTIC EXERCISES: CPT

## 2025-01-13 NOTE — PROGRESS NOTES
PHYSICAL / OCCUPATIONAL THERAPY - DAILY TREATMENT NOTE (updated )    Patient Name: Christi Page    Date: 2025    : 1960  Insurance: Payor: [facility] MEDICARE / Plan: MIMA FULL DUAL ADVANTAGE 2 / Product Type: *No Product type* /      Patient  verified Yes     Visit #   Current / Total 7 16   Time   In / Out 11:00 11:50   Pain   In / Out 1/10 1/10   Subjective Functional Status/Changes: The shoulder is still achy. About 6 weeks post op today.      TREATMENT AREA =  Left shoulder pain [M25.512]    OBJECTIVE    Modalities Rationale:     decrease edema, decrease inflammation, and decrease pain to improve patient's ability to progress to PLOF and address remaining functional goals.     min [] Estim Unattended, type/location:                                      []  w/ice    []  w/heat    min [] Estim Attended, type/location:                                     []  w/US     []  w/ice    []  w/heat    []  TENS insruct      min []  Mechanical Traction: type/lbs                   []  pro   []  sup   []  int   []  cont    []  before manual    []  after manual    min []  Ultrasound, settings/location:     10 min  unbill []  Ice     [x]  Heat    location/position: Siting L shoulder    min []  Paraffin,  details:     min []  Vasopneumatic Device, press/temp:     min []  Whirlpool / Fluido:    If using vaso (only need to measure limb vaso being performed on)      pre-treatment girth :       post-treatment girth :       measured at (landmark location) :      min []  Other:    Skin assessment post-treatment:   Intact      Therapeutic Procedures:  Tx Min Billable or 1:1 Min (if diff from Tx Min) Procedure, Rationale, Specifics   14  55855 Therapeutic Exercise (timed):  increase ROM, strength, coordination, balance, and proprioception to improve patient's ability to progress to PLOF and address remaining functional goals. (see flow sheet as applicable)     Details if applicable:    L shoulder PROM all planes

## 2025-01-14 NOTE — PROGRESS NOTES
PATIENT NEEDS MEDICAL CLEARANCE. TESTING PER ANESTHESIA GUIDELINES. ANTIBIOTICS AS ORDERED TO BE GIVEN IN PREOP HOLDING AREA. DR. STEEL TO DO H&P.  
Sebastian Lopez, PT [facility]   1/13/2025 11:00 AM Sebastian Lopez, PT [facility]   1/15/2025 10:20 AM Sebastian Lopez, PT [facility]   1/20/2025 10:20 AM Sebastian Lopez, PT [facility]   1/22/2025 10:20 AM Sebastian Lopez, PT [facility]   8/11/2025  9:45 AM OMID RITCHIE BX RM 1 [facility]

## 2025-01-15 ENCOUNTER — HOSPITAL ENCOUNTER (OUTPATIENT)
Facility: HOSPITAL | Age: 65
Setting detail: RECURRING SERIES
Discharge: HOME | End: 2025-01-18
Payer: MEDICARE

## 2025-01-15 PROCEDURE — 97112 NEUROMUSCULAR REEDUCATION: CPT

## 2025-01-15 PROCEDURE — 97530 THERAPEUTIC ACTIVITIES: CPT

## 2025-01-15 PROCEDURE — 97110 THERAPEUTIC EXERCISES: CPT

## 2025-01-15 NOTE — PROGRESS NOTES
ability to progress to PLOF and address remaining functional goals.  The manual therapy interventions were performed at a separate and distinct time from the therapeutic activities interventions . (see flow sheet as applicable)     Details if applicable:    Scar massage  STM UT and posterior cuff   40  MC BC Totals Reminder: bill using total billable min of TIMED therapeutic procedures (example: do not include dry needle or estim unattended, both untimed codes, in totals to left)  8-22 min = 1 unit; 23-37 min = 2 units; 38-52 min = 3 units; 53-67 min = 4 units; 68-82 min = 5 units   Total Total     [x]  Patient Education billed concurrently with other procedures   [x] Review HEP    [] Progressed/Changed HEP, detail:   Access Code: [email or web address]  URL: [email or web address]  Date: 12/16/2024  Prepared by: Sebastian Lopez     Exercises  - Isometric Shoulder Flexion at Wall  - 2 x daily - 7 x weekly - 1 sets - 10 reps - 5 hold  - Isometric Shoulder Abduction at Wall  - 2 x daily - 7 x weekly - 1 sets - 10 reps - 5 hold  - Isometric Shoulder External Rotation at Wall  - 2 x daily - 7 x weekly - 1 sets - 10 reps - 5 hold  - Standing Isometric Shoulder Internal Rotation at Doorway  - 2 x daily - 7 x weekly - 1 sets - 10 reps - 5 hold  - Standing 'L' Stretch at Counter  - 1-3 x daily - 7 x weekly - 10 reps - 3-5\" hold     [] Other detail:       Objective Information/Functional Measures/Assessment    S/p L reverse TSA performed on 12/3/24  Pt will be 7 weeks on 1/21/25    PROM cont to be doing very well. Still very painful even with light alternating supine IR/ER isos with IR motion. Slightly better with incr reps.      Patient will continue to benefit from skilled PT / OT services to modify and progress therapeutic interventions, analyze and address functional mobility deficits, analyze and address ROM deficits, analyze and address strength deficits, analyze and address soft tissue restrictions, analyze

## 2025-01-20 ENCOUNTER — APPOINTMENT (OUTPATIENT)
Facility: HOSPITAL | Age: 65
End: 2025-01-20
Payer: MEDICARE

## 2025-01-22 ENCOUNTER — APPOINTMENT (OUTPATIENT)
Facility: HOSPITAL | Age: 65
End: 2025-01-22
Payer: MEDICARE

## 2025-01-22 ENCOUNTER — TELEPHONE (OUTPATIENT)
Facility: HOSPITAL | Age: 65
End: 2025-01-22

## 2025-01-22 NOTE — TELEPHONE ENCOUNTER
Weather Cancellation. Patient has requested to cancel today's appt due to weather. Declined to reschedule and has confirmed 1/24 appt.

## 2025-01-24 ENCOUNTER — HOSPITAL ENCOUNTER (OUTPATIENT)
Facility: HOSPITAL | Age: 65
Setting detail: RECURRING SERIES
Discharge: HOME | End: 2025-01-27
Payer: MEDICARE

## 2025-01-24 PROCEDURE — 97140 MANUAL THERAPY 1/> REGIONS: CPT

## 2025-01-24 PROCEDURE — 97110 THERAPEUTIC EXERCISES: CPT

## 2025-01-24 PROCEDURE — 97535 SELF CARE MNGMENT TRAINING: CPT

## 2025-01-24 PROCEDURE — G0283 ELEC STIM OTHER THAN WOUND: HCPCS

## 2025-01-24 NOTE — PROGRESS NOTES
PHYSICAL / OCCUPATIONAL THERAPY - DAILY TREATMENT NOTE (updated )    Patient Name: Christi Page    Date: 2025    : 1960  Insurance: Payor: [facility] MEDICARE / Plan: MIMA FULL DUAL ADVANTAGE 2 / Product Type: *No Product type* /      Patient  verified Yes     Visit #   Current / Total 9 16   Time   In / Out 11:45 12:45   Pain   In / Out 2/10 2/10   Subjective Functional Status/Changes: It has only started hurting since the morning and last night. When moving the shoulder a certain way it hurts. Called the doctor this morning because the shoulder was hurting. Feels swollen under the arm today. Really feels sore in the arm.      TREATMENT AREA =  Left shoulder pain [M25.512]    OBJECTIVE    Modalities Rationale:     decrease edema, decrease inflammation, and decrease pain to improve patient's ability to progress to PLOF and address remaining functional goals.    10 min [x] Estim Unattended, type/location:  Seated L shoulder: IFC power 11-14                                    [x]  w/ice    []  w/heat    min [] Estim Attended, type/location:                                     []  w/US     []  w/ice    []  w/heat    []  TENS insruct      min []  Mechanical Traction: type/lbs                   []  pro   []  sup   []  int   []  cont    []  before manual    []  after manual    min []  Ultrasound, settings/location:      min  unbill []  Ice     [x]  Heat    location/position: Siting L shoulder    min []  Paraffin,  details:     min []  Vasopneumatic Device, press/temp:     min []  Whirlpool / Fluido:    If using vaso (only need to measure limb vaso being performed on)      pre-treatment girth :       post-treatment girth :       measured at (landmark location) :      min []  Other:    Skin assessment post-treatment:   Intact      Therapeutic Procedures:  Tx Min Billable or 1:1 Min (if diff from Tx Min) Procedure, Rationale, Specifics   05 55 [id number] Therapeutic Exercise (timed):  increase ROM, strength,

## 2025-01-27 ENCOUNTER — HOSPITAL ENCOUNTER (OUTPATIENT)
Facility: HOSPITAL | Age: 65
Setting detail: RECURRING SERIES
Discharge: HOME | End: 2025-01-30
Payer: MEDICARE

## 2025-01-27 PROCEDURE — 97110 THERAPEUTIC EXERCISES: CPT

## 2025-01-27 PROCEDURE — 97112 NEUROMUSCULAR REEDUCATION: CPT

## 2025-01-27 PROCEDURE — 97140 MANUAL THERAPY 1/> REGIONS: CPT

## 2025-01-27 NOTE — PROGRESS NOTES
surgeries for general population    15   18469 Manual Therapy (timed):  decrease pain, increase ROM, and increase tissue extensibility to improve patient's ability to progress to PLOF and address remaining functional goals.  The manual therapy interventions were performed at a separate and distinct time from the therapeutic activities interventions . (see flow sheet as applicable)     Details if applicable:    Scar massage  STM UT and posterior cuff, TPR biceps    40  MC BC Totals Reminder: bill using total billable min of TIMED therapeutic procedures (example: do not include dry needle or estim unattended, both untimed codes, in totals to left)  8-22 min = 1 unit; 23-37 min = 2 units; 38-52 min = 3 units; 53-67 min = 4 units; 68-82 min = 5 units   Total Total     [x]  Patient Education billed concurrently with other procedures   [x] Review HEP    [] Progressed/Changed HEP, detail:   Access Code: [email or web address]  URL: [email or web address]  Date: 12/16/2024  Prepared by: Sebastian Lopez     Exercises  - Isometric Shoulder Flexion at Wall  - 2 x daily - 7 x weekly - 1 sets - 10 reps - 5 hold  - Isometric Shoulder Abduction at Wall  - 2 x daily - 7 x weekly - 1 sets - 10 reps - 5 hold  - Isometric Shoulder External Rotation at Wall  - 2 x daily - 7 x weekly - 1 sets - 10 reps - 5 hold  - Standing Isometric Shoulder Internal Rotation at Doorway  - 2 x daily - 7 x weekly - 1 sets - 10 reps - 5 hold  - Standing 'L' Stretch at Counter  - 1-3 x daily - 7 x weekly - 10 reps - 3-5\" hold     [] Other detail:       Objective Information/Functional Measures/Assessment    S/p L reverse TSA performed on 12/3/24  Pt will be 8 weeks on 1/28/25    Much more tolerable pain today. Still some pain with ER PROM at end or range. Flexion and scaption stretching tolerable with firm feels. Able to dowel lux chest press for AAROM flexion short lever arm today without much pain. Tolerable biceps curl with fatigue and

## 2025-01-31 ENCOUNTER — HOSPITAL ENCOUNTER (OUTPATIENT)
Facility: HOSPITAL | Age: 65
Setting detail: RECURRING SERIES
End: 2025-01-31
Payer: MEDICARE

## 2025-01-31 ENCOUNTER — TELEPHONE (OUTPATIENT)
Facility: HOSPITAL | Age: 65
End: 2025-01-31

## 2025-02-03 ENCOUNTER — HOSPITAL ENCOUNTER (OUTPATIENT)
Facility: HOSPITAL | Age: 65
Setting detail: RECURRING SERIES
Discharge: HOME | End: 2025-02-06
Payer: MEDICARE

## 2025-02-03 PROCEDURE — 97140 MANUAL THERAPY 1/> REGIONS: CPT

## 2025-02-03 PROCEDURE — 97110 THERAPEUTIC EXERCISES: CPT

## 2025-02-03 PROCEDURE — 97112 NEUROMUSCULAR REEDUCATION: CPT

## 2025-02-03 NOTE — PROGRESS NOTES
PHYSICAL / OCCUPATIONAL THERAPY - DAILY TREATMENT NOTE (updated )    Patient Name: Christi Page    Date: 2/3/2025    : 1960  Insurance: Payor: [facility] MEDICARE / Plan: MIMA FULL DUAL ADVANTAGE 2 / Product Type: *No Product type* /      Patient  verified Yes     Visit #   Current / Total 11 16   Time   In / Out 11:00 11:50   Pain   In / Out 0/10 0/10   Subjective Functional Status/Changes: The shoulder is not hurting too bad today.      TREATMENT AREA =  Left shoulder pain [M25.512]    OBJECTIVE    Modalities Rationale:     decrease edema, decrease inflammation, and decrease pain to improve patient's ability to progress to PLOF and address remaining functional goals.    PD min [x] Estim Unattended, type/location:  Seated L shoulder: IFC power 11-14                                    [x]  w/ice    []  w/heat    min [] Estim Attended, type/location:                                     []  w/US     []  w/ice    []  w/heat    []  TENS insruct      min []  Mechanical Traction: type/lbs                   []  pro   []  sup   []  int   []  cont    []  before manual    []  after manual    min []  Ultrasound, settings/location:     10 min  unbill []  Ice     [x]  Heat    location/position: Siting L shoulder    min []  Paraffin,  details:     min []  Vasopneumatic Device, press/temp:     min []  Whirlpool / Fluido:    If using vaso (only need to measure limb vaso being performed on)      pre-treatment girth :       post-treatment girth :       measured at (landmark location) :      min []  Other:    Skin assessment post-treatment:   Intact      Therapeutic Procedures:  Tx Min Billable or 1:1 Min (if diff from Tx Min) Procedure, Rationale, Specifics   15  16401 Therapeutic Exercise (timed):  increase ROM, strength, coordination, balance, and proprioception to improve patient's ability to progress to PLOF and address remaining functional goals. (see flow sheet as applicable)     Details if applicable:    L

## 2025-02-07 ENCOUNTER — HOSPITAL ENCOUNTER (OUTPATIENT)
Facility: HOSPITAL | Age: 65
Setting detail: RECURRING SERIES
Discharge: HOME | End: 2025-02-10
Payer: MEDICARE

## 2025-02-07 PROCEDURE — 97112 NEUROMUSCULAR REEDUCATION: CPT

## 2025-02-07 PROCEDURE — 97110 THERAPEUTIC EXERCISES: CPT

## 2025-02-07 PROCEDURE — 97140 MANUAL THERAPY 1/> REGIONS: CPT

## 2025-02-07 NOTE — PROGRESS NOTES
progress to PLOF and address remaining functional goals.  The manual therapy interventions were performed at a separate and distinct time from the therapeutic activities interventions . (see flow sheet as applicable)     Details if applicable:    Scar massage  STM UT and posterior cuff, TPR biceps    48  MC BC Totals Reminder: bill using total billable min of TIMED therapeutic procedures (example: do not include dry needle or estim unattended, both untimed codes, in totals to left)  8-22 min = 1 unit; 23-37 min = 2 units; 38-52 min = 3 units; 53-67 min = 4 units; 68-82 min = 5 units   Total Total     [x]  Patient Education billed concurrently with other procedures   [x] Review HEP    [x] Progressed/Changed HEP, detail:   Access Code: [email or web address]  URL: [email or web address]  Date: 02/03/2025  Prepared by: Sebastian Lopez    Exercises  - Isometric Shoulder Flexion at Wall  - 2 x daily - 7 x weekly - 1 sets - 10 reps - 5 hold  - Isometric Shoulder Abduction at Wall  - 2 x daily - 7 x weekly - 1 sets - 10 reps - 5 hold  - Isometric Shoulder External Rotation at Wall  - 2 x daily - 7 x weekly - 1 sets - 10 reps - 5 hold  - Standing Isometric Shoulder Internal Rotation at Doorway  - 2 x daily - 7 x weekly - 1 sets - 10 reps - 5 hold  - Supine Shoulder Flexion Extension AAROM with Dowel  - 1-2 x daily - 7 x weekly - 10 reps - 5\" hold  - Sidelying Shoulder Abduction Palm Forward  - 1 x daily - 7 x weekly - 1-3 sets - 10 reps  - Sidelying Shoulder External Rotation  - 1 x daily - 7 x weekly - 1-3 sets - 10 reps  - Standing Bicep Curls Supinated with Dumbbells  - 1 x daily - 7 x weekly - 2-3 sets - 10 reps  - Standing Shoulder Row with Anchored Resistance  - 1 x daily - 7 x weekly - 2-3 sets - 10 reps  - Standing Shoulder Flexion Wall Walk  - 1-2 x daily - 7 x weekly - 10 reps  - Standing Shoulder Abduction AAROM with Dowel  - 1-2 x daily - 7 x weekly - 10 reps - 5\" hold     [] Other detail:

## 2025-02-07 NOTE — THERAPY RECERTIFICATION
[facility]  [address],[place] [address] - Ph: [phone]  Fx: [phone]  CONTINUED PLAN OF CARE/RECERTIFICATION FOR PHYSICAL THERAPY          Patient Name: Christi Page : 1960   Treatment/Medical Diagnosis: Left shoulder pain [M25.512]   Onset Date: DOS 12/3/24    Referral Source: Jose Miguel Traore MD / [facility] Start of Care (SOC): 24   Prior Hospitalization: See Medical History Provider #: [id number]   Prior Level of Function: Difficulty reaching since injury    Comorbidities: Rheumatoid arthritis involving multiple sites   Anxiety   Essential hypertension   History of DVT (deep vein thrombosis)   Raynaud's disease without gangrene   S/P cervical discectomy 2022 (doing okay. Stiff, no PT for it, had neck brace for 3 month)   Status post total replacement of left hip   Tobacco use   Rheumatoid arthritis involving left wrist   Arthritis of carpometacarpal (CMC) joint of left thumb   Subluxation of distal end of left ulna   Ulnocarpal abutment syndrome, left   Ganglion cyst of volar aspect of left wrist   Chronic wrist pain, left   Rheumatoid arthritis   Hypertension    Visits from SOC: 12 Missed Visits: 1 CXL     PROGRESS TO GOALS:  Short Term Goals: To be accomplished in 4 weeks  1) Pt will be IND with HEP to facilitate self care management.   EVAL: Provided HEP  Current Status: - doing as prescribed  Goal Met?  - MET     2) Pt will improve L shoulder PROM to flexion and scaption to >/= 110 deg and ER to >/= 50 deg in order to improve ability to perform ADLs with improved ROM and less restriction.    EVAL: Shoulder PROM   L flexion 90 deg         R flexion NT  L ABD 90 deg     R ABD NT  L ER 35 deg     R ER NT  L IR 30 deg    R IR NT  Current Status: - L shoulder PROM   Flexion 150 deg   deg   ER 60 deg   IR hand to chest  Goal Met?  - MET     3) Pt will be able to reach over her head to do her hair for

## 2025-02-10 ENCOUNTER — APPOINTMENT (OUTPATIENT)
Facility: HOSPITAL | Age: 65
End: 2025-02-10
Payer: MEDICARE

## 2025-02-11 ENCOUNTER — HOSPITAL ENCOUNTER (OUTPATIENT)
Facility: HOSPITAL | Age: 65
Setting detail: RECURRING SERIES
Discharge: HOME | End: 2025-02-14
Payer: MEDICARE

## 2025-02-11 PROCEDURE — 97112 NEUROMUSCULAR REEDUCATION: CPT

## 2025-02-11 PROCEDURE — 97110 THERAPEUTIC EXERCISES: CPT

## 2025-02-11 NOTE — PROGRESS NOTES
PHYSICAL / OCCUPATIONAL THERAPY - DAILY TREATMENT NOTE (updated )    Patient Name: Christi Page    Date: 2025    : 1960  Insurance: Payor: [facility] MEDICARE / Plan: MIMA FULL DUAL ADVANTAGE 2 / Product Type: *No Product type* /      Patient  verified Yes     Visit #   Current / Total 13 16   Time   In / Out 11:45 12:28   Pain   In / Out 0/10 0/10   Subjective Functional Status/Changes: Still trouble putting  the arm in the pants and coat pockets. Still cannot reach behind her back,      TREATMENT AREA =  Left shoulder pain [M25.512]    OBJECTIVE    Modalities Rationale:     decrease edema, decrease inflammation, and decrease pain to improve patient's ability to progress to PLOF and address remaining functional goals.    PD min [x] Estim Unattended, type/location:  Seated L shoulder: IFC power 11-14                                    [x]  w/ice    []  w/heat    min [] Estim Attended, type/location:                                     []  w/US     []  w/ice    []  w/heat    []  TENS insruct      min []  Mechanical Traction: type/lbs                   []  pro   []  sup   []  int   []  cont    []  before manual    []  after manual    min []  Ultrasound, settings/location:     PD min  unbill []  Ice     [x]  Heat    location/position: Siting L shoulder    min []  Paraffin,  details:     min []  Vasopneumatic Device, press/temp:     min []  Whirlpool / Fluido:    If using vaso (only need to measure limb vaso being performed on)      pre-treatment girth :       post-treatment girth :       measured at (landmark location) :      min []  Other:    Skin assessment post-treatment:   Intact      Therapeutic Procedures:  Tx Min Billable or 1:1 Min (if diff from Tx Min) Procedure, Rationale, Specifics   25  [id number] Therapeutic Exercise (timed):  increase ROM, strength, coordination, balance, and proprioception to improve patient's ability to progress to PLOF and address remaining functional goals. (see flow

## 2025-02-14 ENCOUNTER — HOSPITAL ENCOUNTER (OUTPATIENT)
Facility: HOSPITAL | Age: 65
Setting detail: RECURRING SERIES
Discharge: HOME | End: 2025-02-17
Payer: MEDICARE

## 2025-02-14 PROCEDURE — 97112 NEUROMUSCULAR REEDUCATION: CPT

## 2025-02-14 PROCEDURE — 97110 THERAPEUTIC EXERCISES: CPT

## 2025-02-14 PROCEDURE — 97530 THERAPEUTIC ACTIVITIES: CPT

## 2025-02-14 NOTE — PROGRESS NOTES
PHYSICAL / OCCUPATIONAL THERAPY - DAILY TREATMENT NOTE (updated )    Patient Name: Christi Page    Date: 2025    : 1960  Insurance: Payor: [facility] MEDICARE / Plan: MIMA FULL DUAL ADVANTAGE 2 / Product Type: *No Product type* /      Patient  verified Yes     Visit #   Current / Total 14 30   Time   In / Out 10:25 11:15   Pain   In / Out 0/10 0/10   Subjective Functional Status/Changes: Throbbing in the shoulder like a tooth ache. Tender in the scar area. Was sore the night after the last session.      TREATMENT AREA =  Left shoulder pain [M25.512]    OBJECTIVE    Modalities Rationale:     decrease edema, decrease inflammation, and decrease pain to improve patient's ability to progress to PLOF and address remaining functional goals.    PD min [x] Estim Unattended, type/location:  Seated L shoulder: IFC power 11-14                                    [x]  w/ice    []  w/heat    min [] Estim Attended, type/location:                                     []  w/US     []  w/ice    []  w/heat    []  TENS insruct      min []  Mechanical Traction: type/lbs                   []  pro   []  sup   []  int   []  cont    []  before manual    []  after manual    min []  Ultrasound, settings/location:     10 min  unbill []  Ice     [x]  Heat    location/position: Siting L shoulder    min []  Paraffin,  details:     min []  Vasopneumatic Device, press/temp:     min []  Whirlpool / Fluido:    If using vaso (only need to measure limb vaso being performed on)      pre-treatment girth :       post-treatment girth :       measured at (landmark location) :      min []  Other:    Skin assessment post-treatment:   Intact      Therapeutic Procedures:  Tx Min Billable or 1:1 Min (if diff from Tx Min) Procedure, Rationale, Specifics   15  [id number] Therapeutic Exercise (timed):  increase ROM, strength, coordination, balance, and proprioception to improve patient's ability to progress to PLOF and address remaining functional

## 2025-02-19 ENCOUNTER — APPOINTMENT (OUTPATIENT)
Facility: HOSPITAL | Age: 65
End: 2025-02-19
Payer: MEDICARE

## 2025-02-20 ENCOUNTER — TELEPHONE (OUTPATIENT)
Facility: HOSPITAL | Age: 65
End: 2025-02-20

## 2025-02-21 ENCOUNTER — HOSPITAL ENCOUNTER (OUTPATIENT)
Facility: HOSPITAL | Age: 65
Setting detail: RECURRING SERIES
End: 2025-02-21
Payer: MEDICARE

## 2025-02-21 ENCOUNTER — TELEPHONE (OUTPATIENT)
Facility: HOSPITAL | Age: 65
End: 2025-02-21

## 2025-02-26 ENCOUNTER — APPOINTMENT (OUTPATIENT)
Facility: HOSPITAL | Age: 65
End: 2025-02-26
Payer: MEDICARE

## 2025-02-28 ENCOUNTER — HOSPITAL ENCOUNTER (OUTPATIENT)
Facility: HOSPITAL | Age: 65
Setting detail: RECURRING SERIES
End: 2025-02-28
Payer: MEDICARE

## 2025-02-28 PROCEDURE — 97530 THERAPEUTIC ACTIVITIES: CPT

## 2025-02-28 PROCEDURE — 97140 MANUAL THERAPY 1/> REGIONS: CPT

## 2025-02-28 PROCEDURE — 97110 THERAPEUTIC EXERCISES: CPT

## 2025-02-28 NOTE — PROGRESS NOTES
increase ROM, and increase tissue extensibility to improve patient's ability to progress to PLOF and address remaining functional goals.  The manual therapy interventions were performed at a separate and distinct time from the therapeutic activities interventions . (see flow sheet as applicable)     Details if applicable:    L shoulder PROM all planes within protocol limits   GHJ PA mobs  S/L shoulder ext stretching     40  MC BC Totals Reminder: bill using total billable min of TIMED therapeutic procedures (example: do not include dry needle or estim unattended, both untimed codes, in totals to left)  8-22 min = 1 unit; 23-37 min = 2 units; 38-52 min = 3 units; 53-67 min = 4 units; 68-82 min = 5 units   Total Total     [x]  Patient Education billed concurrently with other procedures   [x] Review HEP    [] Progressed/Changed HEP, detail:   Access Code: [email or web address]  URL: [email or web address]  Date: 02/03/2025  Prepared by: Sebastian Lopez    Exercises  - Isometric Shoulder Flexion at Wall  - 2 x daily - 7 x weekly - 1 sets - 10 reps - 5 hold  - Isometric Shoulder Abduction at Wall  - 2 x daily - 7 x weekly - 1 sets - 10 reps - 5 hold  - Isometric Shoulder External Rotation at Wall  - 2 x daily - 7 x weekly - 1 sets - 10 reps - 5 hold  - Standing Isometric Shoulder Internal Rotation at Doorway  - 2 x daily - 7 x weekly - 1 sets - 10 reps - 5 hold  - Supine Shoulder Flexion Extension AAROM with Dowel  - 1-2 x daily - 7 x weekly - 10 reps - 5\" hold  - Sidelying Shoulder Abduction Palm Forward  - 1 x daily - 7 x weekly - 1-3 sets - 10 reps  - Sidelying Shoulder External Rotation  - 1 x daily - 7 x weekly - 1-3 sets - 10 reps  - Standing Bicep Curls Supinated with Dumbbells  - 1 x daily - 7 x weekly - 2-3 sets - 10 reps  - Standing Shoulder Row with Anchored Resistance  - 1 x daily - 7 x weekly - 2-3 sets - 10 reps  - Standing Shoulder Flexion Wall Walk  - 1-2 x daily - 7 x weekly - 10 reps  -  Appointments   Date Time Provider Department Center   3/3/2025  1:00 PM Sebastian Lopez, PT [facility]   3/5/2025 11:40 AM Sebastian Lopez, PT [facility]   3/10/2025 12:20 PM Sebastian Lopez, PT [facility]   3/13/2025 10:20 AM Sebastian Lopez, PT [facility]   3/18/2025 10:20 AM Sebastian Lopez, PT [facility]   3/21/2025 10:20 AM Sebastian Lopez, PT [facility]   3/24/2025 12:20 PM Sebastian Lopez, PT [facility]   3/28/2025 10:20 AM Sebastian Lopez, PT [facility]   8/11/2025  9:45 AM [facility] RITCHIE BX RM 1 [facility]

## 2025-03-03 ENCOUNTER — HOSPITAL ENCOUNTER (OUTPATIENT)
Facility: HOSPITAL | Age: 65
Setting detail: RECURRING SERIES
Discharge: HOME | End: 2025-03-06
Payer: MEDICARE

## 2025-03-03 PROCEDURE — 97110 THERAPEUTIC EXERCISES: CPT

## 2025-03-03 PROCEDURE — 97140 MANUAL THERAPY 1/> REGIONS: CPT

## 2025-03-03 PROCEDURE — 97112 NEUROMUSCULAR REEDUCATION: CPT

## 2025-03-03 NOTE — PROGRESS NOTES
Sebastian, PT [facility]   3/13/2025 10:20 AM Sebastian Lopez, PT [facility]   3/18/2025 10:20 AM Sebastian Lopez, PT [facility]   3/21/2025 10:20 AM Sebastian Lopez, PT [facility]   3/24/2025 12:20 PM Sebastian Lopez, PT [facility]   3/28/2025 10:20 AM Sebastian Lopez, PT [facility]   8/11/2025  9:45 AM OMID RITCHIE BX RM 1 [facility]

## 2025-03-05 ENCOUNTER — HOSPITAL ENCOUNTER (OUTPATIENT)
Facility: HOSPITAL | Age: 65
Setting detail: RECURRING SERIES
Discharge: HOME | End: 2025-03-08
Payer: MEDICARE

## 2025-03-05 PROCEDURE — 97140 MANUAL THERAPY 1/> REGIONS: CPT

## 2025-03-05 PROCEDURE — 97110 THERAPEUTIC EXERCISES: CPT

## 2025-03-05 PROCEDURE — 97530 THERAPEUTIC ACTIVITIES: CPT

## 2025-03-05 PROCEDURE — 97535 SELF CARE MNGMENT TRAINING: CPT

## 2025-03-05 NOTE — THERAPY RECERTIFICATION
in lbs of force (trial 1, trial 2 =  AVG):  R elbow flexion - 18.9, 15.8 =  17.35   R elbow extension - 16.7, 18.2 = 17.45    L elbow flexion - 10.6, 13.1 = 11.85  L elbow extension - 14.8, 16.2 = 15.5    R shoulder flexion - 9.4, 10.7 = 10.05  R shoulder ABD - 9.6, 10.0 = 9.8  R shoulder Ext - 21.8, 22.7 = 22.25  R shoulder ER - 10.3, 10.2 = 10.25  R shoulder IR - 13.7, 12.6 = 13.145    L shoulder flexion - 7.2, 9.2 = 8.2  L shoulder ABD - 6.3, 7.2 = 6.75  L shoulder ext - 15.8, 17.8 = 16.8  L shoulder ER - 3.2, 3.5 = 3.35  L shoulder IR - 5.0, 4.7 = 4.85    L shoulder AROM   Flexion 120 deg   ABD 90 deg   Scaption 115 deg   Ext 41 deg (R 60 deg)   YASSINE occiput   FIR lateral hip     PROGRESS TO GOALS  1) Pt will be able to reach over her head to do her hair for personal hygiene ADLs.   EVAL: unable  Current Status: - able to reach up to do her hair  Goal Met?  - MET        2) Pt will be able to perform overhead lifting >/= 3 lb with </= 3/10 pain signifying improvement in overall functional capacity and activity tolerance.    EVAL: unable  Current Status: -able to lift 1 lb to upper shelf  Goal Met?  - progressing      3) Pt will improve L shoulder MMT to >/= 4/5 to order to improve ability to perform functional ADLs with improved strength and less restriction.   EVAL: NT  Current Status: - all tested via HHD and done without pain  Goal Met?  - progressing     4) Pt will improve L shoulder AROM to flexion and scaption >/= 130 deg and HBB mobility to >/= L5 in order to improve ability to perform functional ADLs with improved ROM and less restriction.    EVAL: AROM NT on IE day  Current Status: - L shoulder AROM   Flexion 120 deg   ABD 90 deg   Scaption 115 deg   Goal Met?  - progressing    5) Pt will be able to pull up her pants IND without pain in the shoulder for ease of dressing ADLs   EVAL: unable  Current Status: - still having difficulty  Goal Met?  - not met    6) Pt will be able to carry 15 lb in L

## 2025-03-05 NOTE — PROGRESS NOTES
See Progress Note/Recertification    NEXT PN/RC DUE RECERT DATE   4/5/25 4/7/25      Auth Visit Count Auth Expiration Date   Med nec 12/31/24   15 1/15/25 - 4/15/25     PLAN  Yes  Continue plan of care  [x]  Upgrade activities as tolerated  []  Discharge due to :  []  Other:    Sebastian Lopez PT    3/5/2025    10:02 AM    If an interpreting service was utilized for treatment of this patient, the contents of this document represent the material reviewed with the patient via the .     Future Appointments   Date Time Provider Department Center   3/5/2025 11:40 AM Sebastian Lopez PT [facility]   3/10/2025 12:20 PM Sebastian Lopez PT [facility]   3/13/2025 10:20 AM Sebastian Lopez, PT [facility]   3/18/2025 10:20 AM Sebastian Lopez, PT [facility]   3/21/2025 10:20 AM Sebastian Lopez PT [facility]   3/24/2025 12:20 PM Sebastian Lopez, PT [facility]   3/28/2025 10:20 AM Sebastian Lopez, PT [facility]   8/11/2025  9:45 AM OMID RITCHIE BX RM 1 [facility]

## 2025-03-10 ENCOUNTER — HOSPITAL ENCOUNTER (OUTPATIENT)
Facility: HOSPITAL | Age: 65
Setting detail: RECURRING SERIES
Discharge: HOME | End: 2025-03-13
Payer: MEDICARE

## 2025-03-10 PROCEDURE — 97140 MANUAL THERAPY 1/> REGIONS: CPT

## 2025-03-10 PROCEDURE — 97110 THERAPEUTIC EXERCISES: CPT

## 2025-03-10 NOTE — PROGRESS NOTES
daily - 7 x weekly - 10 reps  - Standing Bicep Curls Supinated with Dumbbells  - 1 x daily - 3 x weekly - 2-3 sets - 10 reps  - Standing Elbow Extension with Self-Anchored Resistance  - 1 x daily - 3 x weekly - 3 sets - 10 reps  - Standing Shoulder Row with Anchored Resistance  - 1 x daily - 3 x weekly - 2-3 sets - 10 reps  - Shoulder extension with resistance - Neutral  - 1 x daily - 3 x weekly - 3 sets - 10 reps  - Shoulder Internal Rotation Reactive Isometrics  - 1 x daily - 3 x weekly - 3 sets - 10 reps  - Shoulder Internal Rotation with Resistance  - 1 x daily - 3 x weekly - 3 sets - 10 reps  - Shoulder External Rotation Reactive Isometrics  - 1 x daily - 3 x weekly - 3 sets - 10 reps  - Shoulder External Rotation with Anchored Resistance  - 1 x daily - 3 x weekly - 3 sets - 10 reps  - Standing Wall Ball Circles with Plyo Ball  - 1 x daily - 3 x weekly - 2 sets - 10 reps  - Standing Shoulder Flexion to 90 Degrees with Dumbbells  - 1 x daily - 3 x weekly - 3 sets - 10 reps  - Shoulder Abduction with Dumbbells - Thumbs Up  - 1 x daily - 3 x weekly - 3 sets - 10 reps     Access Code: [email or web address]  URL: [email or web address]  Date: 02/28/2025  Prepared by: Sebastian Lopez    Exercises  - Standing Shoulder Extension AAROM with Dowel  - 1-2 x daily - 7 x weekly - 10 reps - 5\" hold  - Standing Shoulder Extension with Dowel  - 1-2 x daily - 7 x weekly - 10 reps - 5\" hold    Access Code: [email or web address]  URL: [email or web address]  Date: 03/10/2025  Prepared by: Sebastian Lopez    Exercises  - Supine Figure 4 Piriformis Stretch  - 1-2 x daily - 3-7 x weekly - 2-3 sets - 20-30\" hold  - Supine Piriformis Stretch with Foot on Ground  - 1-2 x daily - 3-7 x weekly - 2-3 sets - 20-30\" hold  - Supine Bridge  - 1 x daily - 3 x weekly - 3 sets - 10 reps  - Hooklying Clamshell with Resistance  - 1 x daily - 3 x weekly - 3 sets - 10 reps  - Side Stepping with Resistance at Thighs  - 1 x

## 2025-03-13 ENCOUNTER — HOSPITAL ENCOUNTER (OUTPATIENT)
Facility: HOSPITAL | Age: 65
Setting detail: RECURRING SERIES
Discharge: HOME | End: 2025-03-16
Payer: MEDICARE

## 2025-03-13 PROCEDURE — 97140 MANUAL THERAPY 1/> REGIONS: CPT

## 2025-03-13 PROCEDURE — 97110 THERAPEUTIC EXERCISES: CPT

## 2025-03-13 PROCEDURE — 97112 NEUROMUSCULAR REEDUCATION: CPT

## 2025-03-13 NOTE — PROGRESS NOTES
PHYSICAL / OCCUPATIONAL THERAPY - DAILY TREATMENT NOTE (updated )    Patient Name: Christi Page    Date: 3/13/2025    : 1960  Insurance: Payor: [facility] MEDICARE / Plan: [employer] FULL DUAL ADVANTAGE 2 / Product Type: *No Product type* /      Patient  verified Yes     Visit #   Current / Total 19 30   Time   In / Out 10:24 11:04   Pain   In / Out 0/10 0/10   Subjective Functional Status/Changes: Lifting the arm overhead gets tired. Still cannot lift a teapot with the arm.      TREATMENT AREA =  Left shoulder pain [M25.512]    OBJECTIVE    Therapeutic Procedures:  Tx Min Billable or 1:1 Min (if diff from Tx Min) Procedure, Rationale, Specifics   20  49379 Therapeutic Exercise (timed):  increase ROM, strength, coordination, balance, and proprioception to improve patient's ability to progress to PLOF and address remaining functional goals. (see flow sheet as applicable)     Details if applicable:      []   assessing strength/ROM  []   assessing activity performance (ex: sit to stand, stair negotiation)  []   assessing balance/control (ex. Durant, DGI, SLS)   10  82144 Neuromuscular Re-Education (timed):  improve balance, coordination, kinesthetic sense, posture, core stability and proprioception to improve patient's ability to develop conscious control of individual muscles and awareness of position of extremities in order to progress to PLOF and address remaining functional goals. (see flow sheet as applicable)     Details if applicable:    Supine arm at 45 deg ABD: alternating isos ER/IR 3x10    []   assessing strength/ROM  []   assessing activity performance (ex: sit to stand, stair negotiation)  []   assessing balance/control (ex. Durant, DGI, SLS)     15093 Therapeutic Activity (timed):  use of dynamic activities replicating functional movements to increase ROM, strength, coordination, balance, and proprioception in order to improve patient's ability to progress to PLOF and address remaining functional

## 2025-03-18 ENCOUNTER — HOSPITAL ENCOUNTER (OUTPATIENT)
Facility: HOSPITAL | Age: 65
Setting detail: RECURRING SERIES
Discharge: HOME | End: 2025-03-21
Payer: MEDICARE

## 2025-03-18 PROCEDURE — 97530 THERAPEUTIC ACTIVITIES: CPT

## 2025-03-18 PROCEDURE — 97110 THERAPEUTIC EXERCISES: CPT

## 2025-03-18 PROCEDURE — 97112 NEUROMUSCULAR REEDUCATION: CPT

## 2025-03-18 NOTE — PROGRESS NOTES
PHYSICAL / OCCUPATIONAL THERAPY - DAILY TREATMENT NOTE (updated )    Patient Name: Christi Page    Date: 3/18/2025    : 1960  Insurance: Payor: [facility] MEDICARE / Plan: ANTH FULL DUAL ADVANTAGE 2 / Product Type: *No Product type* /      Patient  verified Yes     Visit #   Current / Total 20 30   Time   In / Out 10:20 11:00   Pain   In / Out 0/10 0/10   Subjective Functional Status/Changes: Still cannot lift a teapot. Just too difficult.      TREATMENT AREA =  Left shoulder pain [M25.512]    OBJECTIVE    Therapeutic Procedures:  Tx Min Billable or 1:1 Min (if diff from Tx Min) Procedure, Rationale, Specifics   20  37341 Therapeutic Exercise (timed):  increase ROM, strength, coordination, balance, and proprioception to improve patient's ability to progress to PLOF and address remaining functional goals. (see flow sheet as applicable)     Details if applicable:      []   assessing strength/ROM  []   assessing activity performance (ex: sit to stand, stair negotiation)  []   assessing balance/control (ex. Durant, DGI, SLS)   10  53183 Neuromuscular Re-Education (timed):  improve balance, coordination, kinesthetic sense, posture, core stability and proprioception to improve patient's ability to develop conscious control of individual muscles and awareness of position of extremities in order to progress to PLOF and address remaining functional goals. (see flow sheet as applicable)     Details if applicable:        []   assessing strength/ROM  []   assessing activity performance (ex: sit to stand, stair negotiation)  []   assessing balance/control (ex. Durant, DGI, SLS)   10  62547 Therapeutic Activity (timed):  use of dynamic activities replicating functional movements to increase ROM, strength, coordination, balance, and proprioception in order to improve patient's ability to progress to PLOF and address remaining functional goals.  (see flow sheet as applicable)     Details if applicable:      []

## 2025-03-21 ENCOUNTER — HOSPITAL ENCOUNTER (OUTPATIENT)
Facility: HOSPITAL | Age: 65
Setting detail: RECURRING SERIES
Discharge: HOME | End: 2025-03-24
Payer: MEDICARE

## 2025-03-21 PROCEDURE — 97112 NEUROMUSCULAR REEDUCATION: CPT

## 2025-03-21 PROCEDURE — 97110 THERAPEUTIC EXERCISES: CPT

## 2025-03-21 PROCEDURE — 97530 THERAPEUTIC ACTIVITIES: CPT

## 2025-03-21 NOTE — PROGRESS NOTES
PHYSICAL / OCCUPATIONAL THERAPY - DAILY TREATMENT NOTE (updated )    Patient Name: Christi Page    Date: 3/21/2025    : 1960  Insurance: Payor: [facility] MEDICARE / Plan: ANTH FULL DUAL ADVANTAGE 2 / Product Type: *No Product type* /      Patient  verified Yes     Visit #   Current / Total 21 30   Time   In / Out 10:22 11:02   Pain   In / Out 0/10 0/10   Subjective Functional Status/Changes: No pain today. Had soreness pain after last session. Tried lifting an 8 lb weight at home but could not. To heavy     TREATMENT AREA =  Left shoulder pain [M25.512]    OBJECTIVE    Therapeutic Procedures:  Tx Min Billable or 1:1 Min (if diff from Tx Min) Procedure, Rationale, Specifics   20  82377 Therapeutic Exercise (timed):  increase ROM, strength, coordination, balance, and proprioception to improve patient's ability to progress to PLOF and address remaining functional goals. (see flow sheet as applicable)     Details if applicable:      []   assessing strength/ROM  []   assessing activity performance (ex: sit to stand, stair negotiation)  []   assessing balance/control (ex. Durant, DGI, SLS)   10  27768 Neuromuscular Re-Education (timed):  improve balance, coordination, kinesthetic sense, posture, core stability and proprioception to improve patient's ability to develop conscious control of individual muscles and awareness of position of extremities in order to progress to PLOF and address remaining functional goals. (see flow sheet as applicable)     Details if applicable:        []   assessing strength/ROM  []   assessing activity performance (ex: sit to stand, stair negotiation)  []   assessing balance/control (ex. Durant, DGI, SLS)   10  88031 Therapeutic Activity (timed):  use of dynamic activities replicating functional movements to increase ROM, strength, coordination, balance, and proprioception in order to improve patient's ability to progress to PLOF and address remaining functional goals.  (see flow

## 2025-03-24 ENCOUNTER — HOSPITAL ENCOUNTER (OUTPATIENT)
Facility: HOSPITAL | Age: 65
Setting detail: RECURRING SERIES
Discharge: HOME | End: 2025-03-27
Payer: MEDICARE

## 2025-03-24 PROCEDURE — 97110 THERAPEUTIC EXERCISES: CPT

## 2025-03-24 PROCEDURE — 97112 NEUROMUSCULAR REEDUCATION: CPT

## 2025-03-24 PROCEDURE — 97530 THERAPEUTIC ACTIVITIES: CPT

## 2025-03-24 NOTE — PROGRESS NOTES
PHYSICAL / OCCUPATIONAL THERAPY - DAILY TREATMENT NOTE (updated )    Patient Name: Christi Page    Date: 3/24/2025    : 1960  Insurance: Payor: [facility] MEDICARE / Plan: ANTH FULL DUAL ADVANTAGE 2 / Product Type: *No Product type* /      Patient  verified Yes     Visit #   Current / Total 22 30   Time   In / Out 12:20 1:00   Pain   In / Out 0/10 0/10   Subjective Functional Status/Changes: The shoulder is aching since Friday. Denies pain.      TREATMENT AREA =  Left shoulder pain [M25.512]    OBJECTIVE    Therapeutic Procedures:  Tx Min Billable or 1:1 Min (if diff from Tx Min) Procedure, Rationale, Specifics   20  25084 Therapeutic Exercise (timed):  increase ROM, strength, coordination, balance, and proprioception to improve patient's ability to progress to PLOF and address remaining functional goals. (see flow sheet as applicable)     Details if applicable:      []   assessing strength/ROM  []   assessing activity performance (ex: sit to stand, stair negotiation)  []   assessing balance/control (ex. Durant, DGI, SLS)   10  20205 Neuromuscular Re-Education (timed):  improve balance, coordination, kinesthetic sense, posture, core stability and proprioception to improve patient's ability to develop conscious control of individual muscles and awareness of position of extremities in order to progress to PLOF and address remaining functional goals. (see flow sheet as applicable)     Details if applicable:        []   assessing strength/ROM  []   assessing activity performance (ex: sit to stand, stair negotiation)  []   assessing balance/control (ex. Durant, DGI, SLS)   10  60507 Therapeutic Activity (timed):  use of dynamic activities replicating functional movements to increase ROM, strength, coordination, balance, and proprioception in order to improve patient's ability to progress to PLOF and address remaining functional goals.  (see flow sheet as applicable)     Details if applicable:      []

## 2025-04-02 ENCOUNTER — HOSPITAL ENCOUNTER (OUTPATIENT)
Facility: HOSPITAL | Age: 65
Setting detail: RECURRING SERIES
Discharge: HOME | End: 2025-04-05
Payer: MEDICARE

## 2025-04-02 PROCEDURE — 97110 THERAPEUTIC EXERCISES: CPT

## 2025-04-02 PROCEDURE — 97530 THERAPEUTIC ACTIVITIES: CPT

## 2025-04-02 PROCEDURE — 97112 NEUROMUSCULAR REEDUCATION: CPT

## 2025-04-02 NOTE — THERAPY RECERTIFICATION
[facility]  [address], [address] [address],[place] [address] - Ph: [phone]  Fx: [phone]  CONTINUED PLAN OF CARE/RECERTIFICATION FOR PHYSICAL THERAPY          Patient Name: Christi Page : 1960   Treatment/Medical Diagnosis: Left shoulder pain   Onset Date: DOS 12/3/24    Referral Source: Jose Miguel Traore MD /  Dr YEIM Laird Start of Care (SOC): 24   Prior Hospitalization: See Medical History Provider #: [id number]   Prior Level of Function: Difficulty reaching since injury    Comorbidities: Rheumatoid arthritis involving multiple sites   Anxiety   Essential hypertension   History of DVT (deep vein thrombosis)   Raynaud's disease without gangrene   S/P cervical discectomy 2022 (doing okay. Stiff, no PT for it, had neck brace for 3 month)   Status post total replacement of left hip   Tobacco use   Rheumatoid arthritis involving left wrist   Arthritis of carpometacarpal (CMC) joint of left thumb   Subluxation of distal end of left ulna   Ulnocarpal abutment syndrome, left   Ganglion cyst of volar aspect of left wrist   Chronic wrist pain, left   Rheumatoid arthritis   Hypertension    Visits from SOC: 22 Missed Visits: 1 NS     PROGRESS TO GOALS:  1) Pt will be able to reach over her head to do her hair for personal hygiene ADLs.   EVAL: unable  Current Status: - able to reach up to do her hair  Goal Met?  - MET     2) Pt will be able to perform overhead lifting >/= 3 lb with </= 3/10 pain signifying improvement in overall functional capacity and activity tolerance.    EVAL: unable  Current Status:  able to lift 3 lb overhead 7x with fatigue  Goal Met?  - MET      3) Pt will improve L shoulder MMT to >/= 4/5 to order to improve ability to perform functional ADLs with improved strength and less restriction.   EVAL: NT  Current Status: - all tested via HHD and done without pain  Goal Met?  - progressing     4) Pt will improve

## 2025-04-09 ENCOUNTER — HOSPITAL ENCOUNTER (OUTPATIENT)
Facility: HOSPITAL | Age: 65
Setting detail: RECURRING SERIES
Discharge: HOME | End: 2025-04-12
Payer: MEDICARE

## 2025-04-09 PROCEDURE — 97110 THERAPEUTIC EXERCISES: CPT

## 2025-04-09 PROCEDURE — 97112 NEUROMUSCULAR REEDUCATION: CPT

## 2025-04-09 NOTE — PROGRESS NOTES
.     Future Appointments   Date Time Provider Department Center   4/16/2025 10:20 AM Sebastian Lopez, [facility]   4/23/2025 11:40 AM Sebastian Lopez, [facility]   5/1/2025 11:40 AM Sebastian Lopez, [facility]   8/11/2025  9:45 AM OMID RITCHIE BX RM 1 [facility]

## 2025-04-16 ENCOUNTER — HOSPITAL ENCOUNTER (OUTPATIENT)
Facility: HOSPITAL | Age: 65
Setting detail: RECURRING SERIES
Discharge: HOME | End: 2025-04-19
Payer: MEDICARE

## 2025-04-16 PROCEDURE — 97110 THERAPEUTIC EXERCISES: CPT

## 2025-04-16 PROCEDURE — 97112 NEUROMUSCULAR REEDUCATION: CPT

## 2025-04-16 NOTE — PROGRESS NOTES
PHYSICAL / OCCUPATIONAL THERAPY - DAILY TREATMENT NOTE (updated )    Patient Name: Christi Page    Date: 2025    : 1960  Insurance: Payor: [facility] MEDICARE / Plan: ANTH FULL DUAL ADVANTAGE 2 / Product Type: *No Product type* /      Patient  verified Yes     Visit #   Current / Total 25 36-40   Time   In / Out 10:22 11:00   Pain   In / Out 0/10 0/10   Subjective Functional Status/Changes: Still aching in the shoulder in the morning and night      TREATMENT AREA =  Left shoulder pain [M25.512]    OBJECTIVE    Therapeutic Procedures:  Tx Min Billable or 1:1 Min (if diff from Tx Min) Procedure, Rationale, Specifics   28  74636 Therapeutic Exercise (timed):  increase ROM, strength, coordination, balance, and proprioception to improve patient's ability to progress to PLOF and address remaining functional goals. (see flow sheet as applicable)     Details if applicable:      []   assessing strength/ROM  []   assessing activity performance (ex: sit to stand, stair negotiation)  []   assessing balance/control (ex. Durant, DGI, SLS)   10  65810 Neuromuscular Re-Education (timed):  improve balance, coordination, kinesthetic sense, posture, core stability and proprioception to improve patient's ability to develop conscious control of individual muscles and awareness of position of extremities in order to progress to PLOF and address remaining functional goals. (see flow sheet as applicable)     Details if applicable:        []   assessing strength/ROM  []   assessing activity performance (ex: sit to stand, stair negotiation)  []   assessing balance/control (ex. Durant, DGI, SLS)     28549 Therapeutic Activity (timed):  use of dynamic activities replicating functional movements to increase ROM, strength, coordination, balance, and proprioception in order to improve patient's ability to progress to PLOF and address remaining functional goals.  (see flow sheet as applicable)     Details if applicable:      []

## 2025-04-23 ENCOUNTER — HOSPITAL ENCOUNTER (OUTPATIENT)
Facility: HOSPITAL | Age: 65
Setting detail: RECURRING SERIES
End: 2025-04-23
Payer: MEDICARE

## 2025-05-01 ENCOUNTER — HOSPITAL ENCOUNTER (OUTPATIENT)
Facility: HOSPITAL | Age: 65
Setting detail: RECURRING SERIES
Discharge: HOME | End: 2025-05-04
Payer: MEDICARE

## 2025-05-01 PROCEDURE — 97110 THERAPEUTIC EXERCISES: CPT

## 2025-05-01 PROCEDURE — 97112 NEUROMUSCULAR REEDUCATION: CPT

## 2025-05-01 NOTE — PROGRESS NOTES
PHYSICAL / OCCUPATIONAL THERAPY - DAILY TREATMENT NOTE (updated )    Patient Name: Christi Page    Date: 2025    : 1960  Insurance: Payor: [facility] MEDICARE / Plan: [employer] FULL DUAL ADVANTAGE 2 / Product Type: *No Product type* /      Patient  verified Yes     Visit #   Current / Total 26 36-40   Time   In / Out 11:42 12:22   Pain   In / Out 0/10 0/10   Subjective Functional Status/Changes: See PN     TREATMENT AREA =  Left shoulder pain [M25.512]    OBJECTIVE    Therapeutic Procedures:  Tx Min Billable or 1:1 Min (if diff from Tx Min) Procedure, Rationale, Specifics   30  99295 Therapeutic Exercise (timed):  increase ROM, strength, coordination, balance, and proprioception to improve patient's ability to progress to PLOF and address remaining functional goals. (see flow sheet as applicable)     Details if applicable:      [x]   assessing strength/ROM  []   assessing activity performance (ex: sit to stand, stair negotiation)  []   assessing balance/control (ex. Durant, DGI, SLS)   10  94817 Neuromuscular Re-Education (timed):  improve balance, coordination, kinesthetic sense, posture, core stability and proprioception to improve patient's ability to develop conscious control of individual muscles and awareness of position of extremities in order to progress to PLOF and address remaining functional goals. (see flow sheet as applicable)     Details if applicable:        []   assessing strength/ROM  []   assessing activity performance (ex: sit to stand, stair negotiation)  []   assessing balance/control (ex. Durant, DGI, SLS)     38028 Therapeutic Activity (timed):  use of dynamic activities replicating functional movements to increase ROM, strength, coordination, balance, and proprioception in order to improve patient's ability to progress to PLOF and address remaining functional goals.  (see flow sheet as applicable)     Details if applicable:      []   assessing strength/ROM  []   assessing activity

## 2025-05-01 NOTE — THERAPY RECERTIFICATION
[facility]  [id number] [facility], [facility] [address],[place] [address] - Ph: [phone]  Fx: [phone]  PHYSICAL THERAPY PROGRESS NOTE  Patient Name: Christi Page : 1960   Treatment/Medical Diagnosis: Left shoulder pain   Referral Source: Jose Miguel Traore MD, Dr YEMI Laird     Date of Initial Visit: 24 Attended Visits: 26 Missed Visits: 0     SUMMARY OF TREATMENT  Pt has been evaluated/assessed and participated in 26 PT sessions involving skilled therapeutic exercise, functional activity training,?neuromuscular facilitation and coordination training, patient education,?manual therapy interventions including STM, scar massage, stretching and PROM, modalities including ice and heat, and instruction on HEP in order to improve upon L shoulder ROM, strength, decreased pain, and return to PLOF.     SUBJECTIVE: still having some trouble at home with lifting ADLs    CURRENT STATUS  Pt is s/p L reverse TSA performed on 12/3/25, about 21+ weeks.   Pt has made good progress in PT. Denies significant pain but it is mostly just achy. Pt report 80% improvement since beginning therapy.  Pt also notes still some difficulty with reaching overhead and out to the side. Can reach behind her back a little better. Able to lift 5 lb overhead but with incr strain and decr stability. Improving well in terms of overall strength since we started the strengthening phase of the protocol. Pt notes having been performing the HEP as prescribed. Pt will continue to benefit from skilled PT to progress exercises and improve upon?functional activities. We will continue with the remaining 4 authorized visits and then DC. Emphasized the importance of strengthening 2-3x per week in order to further improve upon long term strength.    PROGRESS TO GOALS  Pt will be able to carry 15 lb in L  feet for ease of carrying groceries at home   Status at last Eval: goal

## 2025-05-08 ENCOUNTER — APPOINTMENT (OUTPATIENT)
Facility: HOSPITAL | Age: 65
End: 2025-05-08
Payer: MEDICARE

## 2025-05-12 ENCOUNTER — HOSPITAL ENCOUNTER (OUTPATIENT)
Facility: HOSPITAL | Age: 65
Setting detail: RECURRING SERIES
Discharge: HOME | End: 2025-05-15
Payer: MEDICARE

## 2025-05-12 PROCEDURE — 97110 THERAPEUTIC EXERCISES: CPT

## 2025-05-12 PROCEDURE — 97112 NEUROMUSCULAR REEDUCATION: CPT

## 2025-05-12 NOTE — PROGRESS NOTES
PHYSICAL / OCCUPATIONAL THERAPY - DAILY TREATMENT NOTE (updated )    Patient Name: Christi Page    Date: 2025    : 1960  Insurance: Payor: [facility] MEDICARE / Plan: ANTH FULL DUAL ADVANTAGE 2 / Product Type: *No Product type* /      Patient  verified Yes     Visit #   Current / Total 27 36-40   Time   In / Out 12:22 1:00   Pain   In / Out 0/10 0/10   Subjective Functional Status/Changes: The shoulder is doing okay. Aches when moving it in certain directions. Can lay on it some for about 15 min.     TREATMENT AREA =  Left shoulder pain [M25.512]    OBJECTIVE    Therapeutic Procedures:  Tx Min Billable or 1:1 Min (if diff from Tx Min) Procedure, Rationale, Specifics   28  [id number] Therapeutic Exercise (timed):  increase ROM, strength, coordination, balance, and proprioception to improve patient's ability to progress to PLOF and address remaining functional goals. (see flow sheet as applicable)     Details if applicable:      []   assessing strength/ROM  []   assessing activity performance (ex: sit to stand, stair negotiation)  []   assessing balance/control (ex. Durant, DGI, SLS)   10  70133 Neuromuscular Re-Education (timed):  improve balance, coordination, kinesthetic sense, posture, core stability and proprioception to improve patient's ability to develop conscious control of individual muscles and awareness of position of extremities in order to progress to PLOF and address remaining functional goals. (see flow sheet as applicable)     Details if applicable:      []   assessing strength/ROM  []   assessing activity performance (ex: sit to stand, stair negotiation)  []   assessing balance/control (ex. Durant, DGI, SLS)     60747 Therapeutic Activity (timed):  use of dynamic activities replicating functional movements to increase ROM, strength, coordination, balance, and proprioception in order to improve patient's ability to progress to PLOF and address remaining functional goals.  (see flow sheet as

## 2025-05-16 ENCOUNTER — HOSPITAL ENCOUNTER (OUTPATIENT)
Facility: HOSPITAL | Age: 65
Setting detail: RECURRING SERIES
Discharge: HOME | End: 2025-05-19
Payer: MEDICARE

## 2025-05-16 PROCEDURE — 97110 THERAPEUTIC EXERCISES: CPT

## 2025-05-16 NOTE — PROGRESS NOTES
PHYSICAL / OCCUPATIONAL THERAPY - DAILY TREATMENT NOTE (updated )    Patient Name: Christi Page    Date: 2025    : 1960  Insurance: Payor: [facility] MEDICARE / Plan: ANTH FULL DUAL ADVANTAGE 2 / Product Type: *No Product type* /      Patient  verified Yes     Visit #   Current / Total 28 36-40   Time   In / Out 9:40 10:20   Pain   In / Out 0/10 0/10   Subjective Functional Status/Changes: Still difficulty with putting the seatbelt on. Can reach back to the seatbelt clip, but cannot push it down     TREATMENT AREA =  Left shoulder pain [M25.512]    OBJECTIVE    Therapeutic Procedures:  Tx Min Billable or 1:1 Min (if diff from Tx Min) Procedure, Rationale, Specifics   40  [id number] Therapeutic Exercise (timed):  increase ROM, strength, coordination, balance, and proprioception to improve patient's ability to progress to PLOF and address remaining functional goals. (see flow sheet as applicable)     Details if applicable:    PROM shoulder all planes     []   assessing strength/ROM  []   assessing activity performance (ex: sit to stand, stair negotiation)  []   assessing balance/control (ex. Durant, DGI, SLS)     92485 Neuromuscular Re-Education (timed):  improve balance, coordination, kinesthetic sense, posture, core stability and proprioception to improve patient's ability to develop conscious control of individual muscles and awareness of position of extremities in order to progress to PLOF and address remaining functional goals. (see flow sheet as applicable)     Details if applicable:      []   assessing strength/ROM  []   assessing activity performance (ex: sit to stand, stair negotiation)  []   assessing balance/control (ex. Durant, DGI, SLS)     69100 Therapeutic Activity (timed):  use of dynamic activities replicating functional movements to increase ROM, strength, coordination, balance, and proprioception in order to improve patient's ability to progress to PLOF and address remaining functional

## 2025-05-27 ENCOUNTER — HOSPITAL ENCOUNTER (OUTPATIENT)
Facility: HOSPITAL | Age: 65
Setting detail: RECURRING SERIES
End: 2025-05-27
Payer: MEDICARE

## 2025-05-29 ENCOUNTER — TELEPHONE (OUTPATIENT)
Facility: HOSPITAL | Age: 65
End: 2025-05-29

## 2025-05-29 ENCOUNTER — HOSPITAL ENCOUNTER (OUTPATIENT)
Facility: HOSPITAL | Age: 65
Setting detail: RECURRING SERIES
End: 2025-05-29
Payer: MEDICARE

## 2025-05-29 NOTE — TELEPHONE ENCOUNTER
Called pt regarding NS today on 5/29/25. This is her last scheduled appt and was planned to be a DC due to her authorization for PT expiring on 5/31/25. Instructed her to call the office back to determine plan of care: either DC today with possible emailed HEP or try to extend insurance authorization for PT for a couple more visits to do an in person DC with HEP. Provided pt phone # to call office back so we can discuss.

## 2025-07-03 ENCOUNTER — TELEPHONE (OUTPATIENT)
Facility: HOSPITAL | Age: 65
End: 2025-07-03

## 2025-07-03 NOTE — TELEPHONE ENCOUNTER
Spoke to representative and we realized that we had the wrong MD on signature line. DC summary is being refaxed with the right MD name.

## 2025-08-26 ENCOUNTER — HOSPITAL ENCOUNTER (OUTPATIENT)
Dept: WOMENS IMAGING | Facility: HOSPITAL | Age: 65
Discharge: HOME | End: 2025-08-29
Payer: MEDICARE

## 2025-08-26 VITALS — BODY MASS INDEX: 24.8 KG/M2 | WEIGHT: 158 LBS | HEIGHT: 67 IN

## 2025-08-26 DIAGNOSIS — Z12.31 VISIT FOR SCREENING MAMMOGRAM: ICD-10-CM

## 2025-08-26 PROCEDURE — 77063 BREAST TOMOSYNTHESIS BI: CPT
